# Patient Record
Sex: FEMALE | Race: WHITE | NOT HISPANIC OR LATINO | Employment: OTHER | URBAN - METROPOLITAN AREA
[De-identification: names, ages, dates, MRNs, and addresses within clinical notes are randomized per-mention and may not be internally consistent; named-entity substitution may affect disease eponyms.]

---

## 2017-01-04 ENCOUNTER — MYC MEDICAL ADVICE (OUTPATIENT)
Dept: FAMILY MEDICINE | Facility: OTHER | Age: 42
End: 2017-01-04

## 2017-01-04 ASSESSMENT — ENCOUNTER SYMPTOMS: COUGH: 1

## 2017-01-04 NOTE — PROGRESS NOTES
"  SUBJECTIVE:                                                    Nithya Tolentino is a 41 year old female who presents to clinic today for the following health issues:      Cough      Acute Illness   Acute illness concerns: URI  Onset: 2 months     Fever: no    Chills/Sweats: no    Headache (location?): YES    Sinus Pressure:YES    Conjunctivitis:  no    Ear Pain: YES: both    Rhinorrhea: no    Congestion: YES    Sore Throat: no     Cough: YES - a little bit    Wheeze: YES    Decreased Appetite: no    Nausea: no    Vomiting: no    Diarrhea:  YES    Dysuria/Freq.: no    Fatigue/Achiness: YES    Sick/Strep Exposure: no     Therapies Tried and outcome: tytlenol, albuterol, cough medicine    - 12/19/16 - Amoxicillin, Albuterol, and nebs - felt a little better but as soon as came off antibiotic symptoms came back    - 12/7/16 - Z-pack and steroid - did nothing     Problem list and histories reviewed & adjusted, as indicated.  Additional history: as documented    Problem list, Medication list, Allergies, and Medical/Social/Surgical histories reviewed in EPIC and updated as appropriate.    ROS:  Constitutional, HEENT, cardiovascular, pulmonary, gi and gu systems are negative, except as otherwise noted.    OBJECTIVE:                                                    /80 mmHg  Pulse 72  Temp(Src) 97.4  F (36.3  C) (Oral)  Resp 16  Ht 5' 7.01\" (1.702 m)  Wt 173 lb (78.472 kg)  BMI 27.09 kg/m2  SpO2 99%  Body mass index is 27.09 kg/(m^2).  GENERAL APPEARANCE: ill appearing, alert and no distress  EYES: Eyes grossly normal to inspection, PERRLA, conjunctivae and sclerae without injection or discharge, EOM intact   HENT: Bilateral ear canals without erythema or cerumen, bilateral TM's pearly grey with normal light reflex, bilateral clear serous effusion with no injection or bulging, nasal turbinates with severe swelling & erythema, or discharge, mouth without ulcers or lesions, oropharynx mildly erythematous without " edema or exudate, post nasal drip present, oral mucous membranes moist, moderate bilateral frontal and maxillary sinus tenderness   NECK: Bilateral anterior cervical adenopathy, no adenopathy in posterior cervical or supraclavicular regions  RESP: Lungs clear to auscultation - no rales, rhonchi or wheezes   CV: Regular rates and rhythm, normal S1 S2, no S3 or S4, no murmur, click or rub  MS: No musculoskeletal defects are noted and gait is age appropriate without ataxia   SKIN: No suspicious lesions or rashes, hydration status appears adeuqate with normal skin turgor   PSYCH: Alert and oriented x3; speech- coherent , normal rate and volume; able to articulate logical thoughts, able to abstract reason, no tangential thoughts, no hallucinations or delusions, mentation appears normal, Mood is euthymic. Affect is appropriate for this mood state and bright. Thought content is free of suicidal ideation, hallucinations, and delusions. Dress is adequate and upkept. Eye contact is good during conversation.       Diagnostic Test Results:  none      ASSESSMENT/PLAN:                                                        ICD-10-CM    1. Acute sinusitis with symptoms > 10 days J01.90 levofloxacin (LEVAQUIN) 750 MG tablet   2. Abnormal finding on thyroid function test R94.6 TSH     T4, free     T3, Free     - Will increase therapy to Levaquin, discussed use and side effects  - Patient declined steroid to help with nasal inflammation   - Hand out given   - Patient also due for her thyroid recheck, this was ordered by her PCP and released       The patient indicates understanding of these issues and agrees with the plan.    Follow up: MAURISIO Flaherty-MANOJ Cespedes  [facility]

## 2017-01-05 ENCOUNTER — OFFICE VISIT (OUTPATIENT)
Dept: FAMILY MEDICINE | Facility: OTHER | Age: 42
End: 2017-01-05
Payer: COMMERCIAL

## 2017-01-05 VITALS
TEMPERATURE: 97.4 F | HEIGHT: 67 IN | HEART RATE: 72 BPM | WEIGHT: 173 LBS | SYSTOLIC BLOOD PRESSURE: 112 MMHG | OXYGEN SATURATION: 99 % | BODY MASS INDEX: 27.15 KG/M2 | DIASTOLIC BLOOD PRESSURE: 80 MMHG | RESPIRATION RATE: 16 BRPM

## 2017-01-05 DIAGNOSIS — J01.90 ACUTE SINUSITIS WITH SYMPTOMS > 10 DAYS: Primary | ICD-10-CM

## 2017-01-05 DIAGNOSIS — R94.6 ABNORMAL FINDING ON THYROID FUNCTION TEST: ICD-10-CM

## 2017-01-05 LAB
T3FREE SERPL-MCNC: 3 PG/ML (ref 2.3–4.2)
T4 FREE SERPL-MCNC: 1.18 NG/DL (ref 0.76–1.46)
TSH SERPL DL<=0.05 MIU/L-ACNC: 0.37 MU/L (ref 0.4–4)

## 2017-01-05 PROCEDURE — 99000 SPECIMEN HANDLING OFFICE-LAB: CPT | Performed by: PHYSICIAN ASSISTANT

## 2017-01-05 PROCEDURE — 84481 FREE ASSAY (FT-3): CPT | Mod: 90 | Performed by: PHYSICIAN ASSISTANT

## 2017-01-05 PROCEDURE — 84443 ASSAY THYROID STIM HORMONE: CPT | Mod: 90 | Performed by: PHYSICIAN ASSISTANT

## 2017-01-05 PROCEDURE — 99213 OFFICE O/P EST LOW 20 MIN: CPT | Performed by: PHYSICIAN ASSISTANT

## 2017-01-05 PROCEDURE — 84439 ASSAY OF FREE THYROXINE: CPT | Mod: 90 | Performed by: PHYSICIAN ASSISTANT

## 2017-01-05 PROCEDURE — 36415 COLL VENOUS BLD VENIPUNCTURE: CPT | Performed by: PHYSICIAN ASSISTANT

## 2017-01-05 RX ORDER — LEVOFLOXACIN 750 MG/1
750 TABLET, FILM COATED ORAL DAILY
Qty: 10 TABLET | Refills: 0 | Status: SHIPPED | OUTPATIENT
Start: 2017-01-05 | End: 2017-03-06

## 2017-01-05 NOTE — MR AVS SNAPSHOT
After Visit Summary   1/5/2017    Nithya Tolentino    MRN: [id number]           Patient Information     Date Of Birth          1975        Visit Information        Provider Department      1/5/2017 10:15 AM Neli Sellers PA-C [facility]        Today's Diagnoses     Acute sinusitis with symptoms > 10 days    -  1       Care Instructions                  Sinusitis           What is sinusitis?   Sinusitis is swollen, infected linings of the sinuses. The sinuses are hollow spaces in the bones of your face and skull. They connect with the nose through small openings. Like the nose, their linings make mucus.   How does it occur?   Sinusitis occurs when the sinus linings become infected. The passageways from the sinuses to the nose are very narrow. Swelling and mucus may block the passageways. This leads to pressure changes in the sinuses that can be painful.   A number of things can cause swelling and sinusitis. Most often it's allergens (things that cause allergies, like pollen and mold) and viruses, such as viruses that cause the common cold. Whether the cause is allergies or a virus, the sinus linings can swell. When swelling causes the sinus passageway to swell shut, bacteria, viruses, and even fungus can be trapped in the sinuses and cause a sinus infection.   If your nasal bones have been injured or are deformed, causing partial blockage of the sinus openings, you are more likely to get sinusitis.   What are the symptoms?   Symptoms include:   feeling of fullness or pressure in your head   a headache that is most painful when you first wake up in the morning or when you bend your head down or forward   pain above or below your eyes   aching in the upper jaw and teeth   runny or stuffy nose   cough, especially at night   fluid draining down the back of your throat (postnasal drainage)   sore throat in the morning or evening.   How is it diagnosed?   Your healthcare  provider will ask about your symptoms and will examine you. You may have an X-ray to look for swelling, fluid, or small benign growths (polyps) in the sinuses.   How is it treated?   Decongestants may help. They may be nonprescription or prescription. They are available as liquids, pills, and nose sprays.   Your healthcare provider may prescribe an antibiotic. In some cases you may need to take decongestants and antibiotics for several weeks.   You may need nonprescription medicine for pain, such as acetaminophen or ibuprofen. Check with your healthcare provider before you give any medicine that contains aspirin or salicylates to a child or teen. This includes medicines like baby aspirin, some cold medicines, and Pepto Bismol. Children and teens who take aspirin are at risk for a serious illness called Reye's syndrome. Ibuprofen is an NSAID. Nonsteroidal anti-inflammatory medicines (NSAIDs) may cause stomach bleeding and other problems. These risks increase with age. Read the label and take as directed. Unless recommended by your healthcare provider, do not take NSAIDs for more than 10 days for any reason.   If you have chronic or repeated sinus infections, allergies may be the cause. Your healthcare provider may prescribe antihistamine tablets or prescription nasal sprays (steroids or cromolyn) to treat the allergies.   If you have chronic, severe sinusitis that does not respond to treatment with medicines, surgery may be done. The surgeon can create an extra or enlarged passageway in the wall of the sinus cavity. This allows the sinuses to drain more easily through the nasal passages. This should help them stay free of infection.   How long will the effects last?   Symptoms may get better gradually over 3 to 10 days. Depending on what caused the sinusitis and how severe it is, it may last for days or weeks. The symptoms may come back if you do not finish all of your antibiotic.   How can I take care of myself?    Follow your healthcare provider's instructions.   If you are taking an antibiotic, take all of it as directed by your provider. If you stop taking the medicine when your symptoms are gone but before you have taken all of the medicine, symptoms may come back.   Avoid tobacco smoke.   If you have allergies, take care to avoid the things you are allergic to, such as animal dander.   Add moisture to the air with a humidifier or a vaporizer, unless you have mold allergy (mold may grow in your vaporizer).   Inhale steam from a basin of hot water or shower to help open your sinuses and relieve pain.   Use saline nasal sprays to help wash out nasal passages and clear some mucus from the airways.   Use decongestants as directed on the label or by your provider.   If you are using a nonprescription nasal-spray decongestant, generally you should not use it for more than 3 days. After 3 days it may cause your symptoms to get worse. Ask your healthcare provider if it is OK for you to use a nasal spray decongestant longer than this.   Get plenty of rest.   Drink more fluids to keep the mucus as thin as possible so your sinuses can drain more easily.   Put warm compresses on painful areas.   Take antibiotics as prescribed. Use all of the medicine, even after you feel better. Some sinus infections require 2 to 4 weeks of antibiotic treatment.   See your healthcare provider if the pain lasts for several days or gets worse.   If the sinus areas above or below your eyes are swollen or bulging, see your healthcare provider right away. This symptom may mean that the infection is spreading. A spreading infection can affect other parts of your body--even the brain--and needs to be treated promptly.   How can I help prevent sinusitis?   Treat your colds and allergies promptly. Use decongestants as soon as you start having symptoms.   Do not smoke and stay away from secondhand smoke.   Drink lots of fluids to keep the mucus thin.    Humidify your home if the air is particularly dry.   If you have sinus infections often, consider having allergy tests.   If sinusitis continues to be a problem despite treatment, you might need an exam by an ear, nose, and throat doctor (called an ENT or otolaryngologist). The specialist will check for polyps or a deformed bone that may be blocking your sinuses.     Published by [employer].  This content is reviewed periodically and is subject to change as new health information becomes available. The information is intended to inform and educate and is not a replacement for medical evaluation, advice, diagnosis or treatment by a healthcare professional.   Developed by [employer].   ? 2010 [employer] and/or its affiliates. All Rights Reserved.   Copyright   Clinical Reference Systems 2011  Adult Health Advisor              Follow-ups after your visit        Who to contact     If you have questions or need follow up information about today's clinic visit or your schedule please [facility] at [phone].  Normal or non-critical lab and imaging results will be communicated to you by [employer], letter or phone within 4 business days after the clinic has received the results. If you do not hear from us within 7 days, please contact the clinic through [employer] or phone. If you have a critical or abnormal lab result, we will notify you by phone as soon as possible.  Submit refill requests through [employer] or call your pharmacy and they will forward the refill request to us. Please allow 3 business days for your refill to be completed.          Additional Information About Your Visit        [employer] Information     [employer] gives you secure access to your electronic health record. If you see a primary care provider, you can also send messages to your care team and make appointments. If you have questions, please call your primary care clinic.  If you do not have a primary care provider,  "please call [phone] and they will assist you.        Care EveryWhere ID     This is your Care EveryWhere ID. This could be used by other organizations to access your [place] medical records  [id number]        Your Vitals Were     Pulse Temperature Respirations Height BMI (Body Mass Index) Pulse Oximetry    72 97.4  F (36.3  C) (Oral) 16 5' 7.01\" (1.702 m) 27.09 kg/m2 99%       Blood Pressure from Last 3 Encounters:   01/05/17 112/80   12/19/16 116/66   12/07/16 110/60    Weight from Last 3 Encounters:   01/05/17 173 lb (78.472 kg)   12/19/16 172 lb (78.019 kg)   12/07/16 175 lb (79.379 kg)              Today, you had the following     No orders found for display         Today's Medication Changes          These changes are accurate as of: 1/5/17 10:48 AM.  If you have any questions, ask your nurse or doctor.               Start taking these medicines.        Dose/Directions    levofloxacin 750 MG tablet   Commonly known as:  LEVAQUIN   Used for:  Acute sinusitis with symptoms > 10 days   Started by:  Neli Sellers PA-C        Dose:  750 mg   Take 1 tablet (750 mg) by mouth daily   Quantity:  10 tablet   Refills:  0            Where to get your medicines      These medications were sent to [place] [address] [place] [facility] [address] [address] [place] [address]  [address] [address] [facility] [address]     Phone:  [phone]    - levofloxacin 750 MG tablet             Primary Care Provider    None Specified       No primary provider on file.        Thank you!     Thank you for choosing [facility]  for your care. Our goal is always to provide you with excellent care. Hearing back from our patients is one way we can continue to improve our services. Please take a few minutes to complete the written survey that you may receive in the mail after your visit with us. Thank you!             Your Updated Medication List - Protect others around you: Learn how to safely use, store and throw away your medicines " at www.disposemymeds.org.          This list is accurate as of: 1/5/17 10:48 AM.  Always use your most recent med list.                   Brand Name Dispense Instructions for use    albuterol 108 (90 BASE) MCG/ACT Inhaler    PROAIR HFA/PROVENTIL HFA/VENTOLIN HFA    1 Inhaler    Inhale 2 puffs into the lungs every 6 hours as needed for shortness of breath / dyspnea or wheezing       CLARITIN PO      Take  by mouth.       cyanocobalamin 1000 MCG tablet    vitamin  B-12     Take 1,000 mcg by mouth daily       fluticasone 50 MCG/ACT spray    FLONASE    16 g    Spray 2 sprays into both nostrils daily       guaiFENesin-codeine 100-10 MG/5ML Soln solution    ROBITUSSIN AC    120 mL    Take 5 mLs by mouth every 4 hours as needed for cough       IRON SUPPLEMENT PO      Take 65 mg by mouth daily       levofloxacin 750 MG tablet    LEVAQUIN    10 tablet    Take 1 tablet (750 mg) by mouth daily       naproxen sodium 275 MG tablet    ANAPROX    90 tablet    Take 1 tablet (275 mg) by mouth 2 times daily (with meals)       TYLENOL 500 MG tablet   Generic drug:  acetaminophen      Take 1-2 tablets by mouth every 6 hours as needed. 2 tablets before bedtime

## 2017-01-05 NOTE — NURSING NOTE
"Chief Complaint   Patient presents with     Cough     Panel Management     Flu       Initial /80 mmHg  Pulse 72  Temp(Src) 97.4  F (36.3  C) (Oral)  Resp 16  Ht 5' 7.01\" (1.702 m)  Wt 173 lb (78.472 kg)  BMI 27.09 kg/m2  SpO2 99% Estimated body mass index is 27.09 kg/(m^2) as calculated from the following:    Height as of this encounter: 5' 7.01\" (1.702 m).    Weight as of this encounter: 173 lb (78.472 kg).  BP completed using cuff size: regular  "

## 2017-01-05 NOTE — PATIENT INSTRUCTIONS
Sinusitis           What is sinusitis?   Sinusitis is swollen, infected linings of the sinuses. The sinuses are hollow spaces in the bones of your face and skull. They connect with the nose through small openings. Like the nose, their linings make mucus.   How does it occur?   Sinusitis occurs when the sinus linings become infected. The passageways from the sinuses to the nose are very narrow. Swelling and mucus may block the passageways. This leads to pressure changes in the sinuses that can be painful.   A number of things can cause swelling and sinusitis. Most often it's allergens (things that cause allergies, like pollen and mold) and viruses, such as viruses that cause the common cold. Whether the cause is allergies or a virus, the sinus linings can swell. When swelling causes the sinus passageway to swell shut, bacteria, viruses, and even fungus can be trapped in the sinuses and cause a sinus infection.   If your nasal bones have been injured or are deformed, causing partial blockage of the sinus openings, you are more likely to get sinusitis.   What are the symptoms?   Symptoms include:   feeling of fullness or pressure in your head   a headache that is most painful when you first wake up in the morning or when you bend your head down or forward   pain above or below your eyes   aching in the upper jaw and teeth   runny or stuffy nose   cough, especially at night   fluid draining down the back of your throat (postnasal drainage)   sore throat in the morning or evening.   How is it diagnosed?   Your healthcare provider will ask about your symptoms and will examine you. You may have an X-ray to look for swelling, fluid, or small benign growths (polyps) in the sinuses.   How is it treated?   Decongestants may help. They may be nonprescription or prescription. They are available as liquids, pills, and nose sprays.   Your healthcare provider may prescribe an antibiotic. In some cases you may need to  take decongestants and antibiotics for several weeks.   You may need nonprescription medicine for pain, such as acetaminophen or ibuprofen. Check with your healthcare provider before you give any medicine that contains aspirin or salicylates to a child or teen. This includes medicines like baby aspirin, some cold medicines, and Pepto Bismol. Children and teens who take aspirin are at risk for a serious illness called Reye's syndrome. Ibuprofen is an NSAID. Nonsteroidal anti-inflammatory medicines (NSAIDs) may cause stomach bleeding and other problems. These risks increase with age. Read the label and take as directed. Unless recommended by your healthcare provider, do not take NSAIDs for more than 10 days for any reason.   If you have chronic or repeated sinus infections, allergies may be the cause. Your healthcare provider may prescribe antihistamine tablets or prescription nasal sprays (steroids or cromolyn) to treat the allergies.   If you have chronic, severe sinusitis that does not respond to treatment with medicines, surgery may be done. The surgeon can create an extra or enlarged passageway in the wall of the sinus cavity. This allows the sinuses to drain more easily through the nasal passages. This should help them stay free of infection.   How long will the effects last?   Symptoms may get better gradually over 3 to 10 days. Depending on what caused the sinusitis and how severe it is, it may last for days or weeks. The symptoms may come back if you do not finish all of your antibiotic.   How can I take care of myself?   Follow your healthcare provider's instructions.   If you are taking an antibiotic, take all of it as directed by your provider. If you stop taking the medicine when your symptoms are gone but before you have taken all of the medicine, symptoms may come back.   Avoid tobacco smoke.   If you have allergies, take care to avoid the things you are allergic to, such as animal dander.   Add  moisture to the air with a humidifier or a vaporizer, unless you have mold allergy (mold may grow in your vaporizer).   Inhale steam from a basin of hot water or shower to help open your sinuses and relieve pain.   Use saline nasal sprays to help wash out nasal passages and clear some mucus from the airways.   Use decongestants as directed on the label or by your provider.   If you are using a nonprescription nasal-spray decongestant, generally you should not use it for more than 3 days. After 3 days it may cause your symptoms to get worse. Ask your healthcare provider if it is OK for you to use a nasal spray decongestant longer than this.   Get plenty of rest.   Drink more fluids to keep the mucus as thin as possible so your sinuses can drain more easily.   Put warm compresses on painful areas.   Take antibiotics as prescribed. Use all of the medicine, even after you feel better. Some sinus infections require 2 to 4 weeks of antibiotic treatment.   See your healthcare provider if the pain lasts for several days or gets worse.   If the sinus areas above or below your eyes are swollen or bulging, see your healthcare provider right away. This symptom may mean that the infection is spreading. A spreading infection can affect other parts of your body--even the brain--and needs to be treated promptly.   How can I help prevent sinusitis?   Treat your colds and allergies promptly. Use decongestants as soon as you start having symptoms.   Do not smoke and stay away from secondhand smoke.   Drink lots of fluids to keep the mucus thin.   Humidify your home if the air is particularly dry.   If you have sinus infections often, consider having allergy tests.   If sinusitis continues to be a problem despite treatment, you might need an exam by an ear, nose, and throat doctor (called an ENT or otolaryngologist). The specialist will check for polyps or a deformed bone that may be blocking your sinuses.     Published by  [employer].  This content is reviewed periodically and is subject to change as new health information becomes available. The information is intended to inform and educate and is not a replacement for medical evaluation, advice, diagnosis or treatment by a healthcare professional.   Developed by [employer].   ? 2010 [facility] and/or its affiliates. All Rights Reserved.   Copyright   Clinical Reference Systems 2011  Adult Health Advisor

## 2017-03-01 ENCOUNTER — TELEPHONE (OUTPATIENT)
Dept: FAMILY MEDICINE | Facility: OTHER | Age: 42
End: 2017-03-01

## 2017-03-01 NOTE — TELEPHONE ENCOUNTER
Patient was seen 1/5/2017 for acute sinus.  Will have her see provider as scheduled.    Justin Rivero RN

## 2017-03-01 NOTE — TELEPHONE ENCOUNTER
Pt is scheduled on 3/3/17 for sinus symptoms. Please call pt to see if they are able to do RN protocol.    Xuan Soto CMA ([facility])

## 2017-03-06 ENCOUNTER — OFFICE VISIT (OUTPATIENT)
Dept: FAMILY MEDICINE | Facility: OTHER | Age: 42
End: 2017-03-06
Payer: COMMERCIAL

## 2017-03-06 VITALS
BODY MASS INDEX: 27.58 KG/M2 | HEIGHT: 67 IN | WEIGHT: 175.7 LBS | DIASTOLIC BLOOD PRESSURE: 64 MMHG | HEART RATE: 76 BPM | RESPIRATION RATE: 12 BRPM | TEMPERATURE: 98.1 F | SYSTOLIC BLOOD PRESSURE: 110 MMHG

## 2017-03-06 DIAGNOSIS — J32.9 RHINOSINUSITIS: ICD-10-CM

## 2017-03-06 DIAGNOSIS — J32.8 OTHER CHRONIC SINUSITIS: Primary | ICD-10-CM

## 2017-03-06 DIAGNOSIS — R00.2 PALPITATIONS: ICD-10-CM

## 2017-03-06 LAB
T3 SERPL-MCNC: 102 NG/DL (ref 60–181)
T4 FREE SERPL-MCNC: 1.11 NG/DL (ref 0.76–1.46)
TSH SERPL DL<=0.05 MIU/L-ACNC: 0.31 MU/L (ref 0.4–4)

## 2017-03-06 PROCEDURE — 99214 OFFICE O/P EST MOD 30 MIN: CPT | Performed by: FAMILY MEDICINE

## 2017-03-06 PROCEDURE — 36415 COLL VENOUS BLD VENIPUNCTURE: CPT | Performed by: FAMILY MEDICINE

## 2017-03-06 PROCEDURE — 84443 ASSAY THYROID STIM HORMONE: CPT | Performed by: FAMILY MEDICINE

## 2017-03-06 PROCEDURE — 84439 ASSAY OF FREE THYROXINE: CPT | Performed by: FAMILY MEDICINE

## 2017-03-06 PROCEDURE — 84480 ASSAY TRIIODOTHYRONINE (T3): CPT | Performed by: FAMILY MEDICINE

## 2017-03-06 RX ORDER — MOMETASONE FUROATE MONOHYDRATE 50 UG/1
2 SPRAY, METERED NASAL DAILY
Qty: 1 BOX | Refills: 11 | Status: SHIPPED | OUTPATIENT
Start: 2017-03-06 | End: 2017-05-15

## 2017-03-06 RX ORDER — FEXOFENADINE HCL 180 MG/1
180 TABLET ORAL DAILY
Qty: 30 TABLET | Refills: 1 | Status: SHIPPED | OUTPATIENT
Start: 2017-03-06 | End: 2022-01-28

## 2017-03-06 RX ORDER — DOXYCYCLINE 100 MG/1
TABLET ORAL
Refills: 0 | COMMUNITY
Start: 2017-02-25 | End: 2017-09-28

## 2017-03-06 RX ORDER — AZELASTINE 1 MG/ML
1-2 SPRAY, METERED NASAL 2 TIMES DAILY
Qty: 1 BOTTLE | Refills: 11 | Status: SHIPPED | OUTPATIENT
Start: 2017-03-06 | End: 2017-09-28

## 2017-03-06 ASSESSMENT — PAIN SCALES - GENERAL: PAINLEVEL: MILD PAIN (2)

## 2017-03-06 NOTE — PATIENT INSTRUCTIONS
Chronic Sinusitis  Chronic sinusitis is a long-term swelling or infection of the sinuses. If sinusitis lasts more than 12 weeks, it is called chronic.  What is chronic sinusitis?  Sinuses are air-filled spaces in the skull behind the face. They are kept moist and clean by a lining of mucosa. Things such as pollen, smoke, and chemical fumes can irritate the mucosa. Constant exposure to irritants can cause ongoing inflammation of this lining. It can also damage tiny hairlike cilia that cover the mucosa. Cilia help transport mucus toward the opening of the sinus. Damage to cilia keeps mucus from draining from the sinuses.  Causes of chronic sinusitis  Problems that irritate the mucosa or block drainage can lead to chronic sinusitis. These may include:    Infections    Chronic allergies    Nasal polyps, deviated septum, or other obstructions    Constant exposure to irritants, such as cigarette smoke or fumes  Common symptoms of chronic sinusitis  Symptoms may include:    Facial pain and pressure    Headache and sinus pain    Nasal congestion    Thick, colored drainage from the nose    Postnasal drainage (thick mucus draining down the back of the throat)    Loss of smell    Fever    Cough    Sore throat  Diagnosis of chronic sinusitis  The doctor will ask about your symptoms and medical history. The doctor will examine your nose and face. You may have imaging studies like an X-ray or CT scan of the sinuses. You may also have a culture of the drainage to check for bacteria. And, a test called an endoscopy may be done. During this test, a lighted tube is put through your nose up into your sinuses to view the sinuses.  Treating chronic sinusitis  Treatment involves reducing irritation and inflammation. Your plan may include:    Taking medications. Medications may be prescribed reduce secretions and swelling. These help unblock the sinuses and allow them to drain. Antibiotics are prescribed for bacterial  infections.    Sinus irrigation (flushing with saltwater or saline solution) may be suggested. This helps to clear out mucus.    A plan to control allergies is helpful if they are present. This plan may include medications or allergy shots.    Surgery, in some cases. Surgery on the nose, sinuses, or both can improve sinus drainage or remove nasal obstructions.    3081-2198 [employer]. [address] [place] [address], [place] [address]. All rights reserved. This information is not intended as a substitute for professional medical care. Always follow your healthcare professional's instructions.

## 2017-03-06 NOTE — MR AVS SNAPSHOT
After Visit Summary   3/6/2017    Nithya Tolentino    MRN: [id number]           Patient Information     Date Of Birth          1975        Visit Information        Provider Department      3/6/2017 8:20 AM Janessa Fontenot MD [facility]        Today's Diagnoses     Other chronic sinusitis    -  1      Care Instructions      Chronic Sinusitis  Chronic sinusitis is a long-term swelling or infection of the sinuses. If sinusitis lasts more than 12 weeks, it is called chronic.  What is chronic sinusitis?  Sinuses are air-filled spaces in the skull behind the face. They are kept moist and clean by a lining of mucosa. Things such as pollen, smoke, and chemical fumes can irritate the mucosa. Constant exposure to irritants can cause ongoing inflammation of this lining. It can also damage tiny hairlike cilia that cover the mucosa. Cilia help transport mucus toward the opening of the sinus. Damage to cilia keeps mucus from draining from the sinuses.  Causes of chronic sinusitis  Problems that irritate the mucosa or block drainage can lead to chronic sinusitis. These may include:    Infections    Chronic allergies    Nasal polyps, deviated septum, or other obstructions    Constant exposure to irritants, such as cigarette smoke or fumes  Common symptoms of chronic sinusitis  Symptoms may include:    Facial pain and pressure    Headache and sinus pain    Nasal congestion    Thick, colored drainage from the nose    Postnasal drainage (thick mucus draining down the back of the throat)    Loss of smell    Fever    Cough    Sore throat  Diagnosis of chronic sinusitis  The doctor will ask about your symptoms and medical history. The doctor will examine your nose and face. You may have imaging studies like an X-ray or CT scan of the sinuses. You may also have a culture of the drainage to check for bacteria. And, a test called an endoscopy may be done. During this test, a lighted tube is put  through your nose up into your sinuses to view the sinuses.  Treating chronic sinusitis  Treatment involves reducing irritation and inflammation. Your plan may include:    Taking medications. Medications may be prescribed reduce secretions and swelling. These help unblock the sinuses and allow them to drain. Antibiotics are prescribed for bacterial infections.    Sinus irrigation (flushing with saltwater or saline solution) may be suggested. This helps to clear out mucus.    A plan to control allergies is helpful if they are present. This plan may include medications or allergy shots.    Surgery, in some cases. Surgery on the nose, sinuses, or both can improve sinus drainage or remove nasal obstructions.    0395-0060 [employer]. [address] [place] [address]. All rights reserved. This information is not intended as a substitute for professional medical care. Always follow your healthcare professional's instructions.              Follow-ups after your visit        Additional Services     OTOLARYNGOLOGY REFERRAL       Your provider has referred you to: [facility]: [facility] [phone]   [email or web address]    Please be aware that coverage of these services is subject to the terms and limitations of your health insurance plan.  Call member services at your health plan with any benefit or coverage questions.      Please bring the following with you to your appointment:    (1) Any X-Rays, CTs or MRIs which have been performed.  Contact the facility where they were done to arrange for  prior to your scheduled appointment.   (2) List of current medications  (3) This referral request   (4) Any documents/labs given to you for this referral                  Who to contact     If you have questions or need follow up information about today's clinic visit or your schedule please contact [facility] [place] directly at [phone].  Normal  "or non-critical lab and imaging results will be communicated to you by MyChart, letter or phone within 4 business days after the clinic has received the results. If you do not hear from us within 7 days, please contact the clinic through [employer] or phone. If you have a critical or abnormal lab result, we will notify you by phone as soon as possible.  Submit refill requests through [employer] or call your pharmacy and they will forward the refill request to us. Please allow 3 business days for your refill to be completed.          Additional Information About Your Visit        [employer] Information     [employer] gives you secure access to your electronic health record. If you see a primary care provider, you can also send messages to your care team and make appointments. If you have questions, please call your primary care clinic.  If you do not have a primary care provider, please call [phone] and they will assist you.        Care EveryWhere ID     This is your Care EveryWhere ID. This could be used by other organizations to access your [facility] medical records  [id number]        Your Vitals Were     Pulse Temperature Respirations Height Last Period Breastfeeding?    76 98.1  F (36.7  C) (Temporal) 12 5' 7\" (1.702 m) 02/15/2017 No    BMI (Body Mass Index)                   27.52 kg/m2            Blood Pressure from Last 3 Encounters:   03/06/17 110/64   01/05/17 112/80   12/19/16 116/66    Weight from Last 3 Encounters:   03/06/17 175 lb 11.2 oz (79.7 kg)   01/05/17 173 lb (78.5 kg)   12/19/16 172 lb (78 kg)              We Performed the Following     OTOLARYNGOLOGY REFERRAL          Today's Medication Changes          These changes are accurate as of: 3/6/17  8:57 AM.  If you have any questions, ask your nurse or doctor.               Start taking these medicines.        Dose/Directions    azelastine 0.1 % spray   Commonly known as:  ASTELIN   Used for:  Other chronic sinusitis   Started by:  Janessa Fontenot " MD Jackie        Dose:  1-2 spray   Spray 1-2 sprays into both nostrils 2 times daily   Quantity:  1 Bottle   Refills:  11       fexofenadine 180 MG tablet   Commonly known as:  ALLEGRA   Used for:  Other chronic sinusitis   Started by:  Janessa Fontenot MD        Dose:  180 mg   Take 1 tablet (180 mg) by mouth daily   Quantity:  30 tablet   Refills:  1       mometasone 50 MCG/ACT spray   Commonly known as:  NASONEX   Used for:  Other chronic sinusitis   Started by:  Janessa Fontenot MD        Dose:  2 spray   Spray 2 sprays into both nostrils daily   Quantity:  1 Box   Refills:  11            Where to get your medicines      These medications were sent to [place] [address] [facility] [place] - [address]  [address] [facility], [place] [address]     Phone:  [phone]     azelastine 0.1 % spray    fexofenadine 180 MG tablet    mometasone 50 MCG/ACT spray                Primary Care Provider Office Phone # Fax #    Janessa Fontenot [phone]       [facility] [address] [place] [address]        Thank you!     Thank you for choosing [facility]  for your care. Our goal is always to provide you with excellent care. Hearing back from our patients is one way we can continue to improve our services. Please take a few minutes to complete the written survey that you may receive in the mail after your visit with us. Thank you!             Your Updated Medication List - Protect others around you: Learn how to safely use, store and throw away your medicines at www.disposemymeds.org.          This list is accurate as of: 3/6/17  8:57 AM.  Always use your most recent med list.                   Brand Name Dispense Instructions for use    albuterol 108 (90 BASE) MCG/ACT Inhaler    PROAIR HFA/PROVENTIL HFA/VENTOLIN HFA    1 Inhaler    Inhale 2 puffs into the lungs every 6 hours as needed for shortness of breath / dyspnea or wheezing       azelastine 0.1 %  spray    ASTELIN    1 Bottle    Spray 1-2 sprays into both nostrils 2 times daily       cyanocobalamin 1000 MCG tablet    vitamin  B-12     Take 1,000 mcg by mouth daily       doxycycline Monohydrate 100 MG Tabs      TAKE 1 TABLET (100 MG) BY MOUTH TWICE A DAY WITH BREAKFAST AND DINNER.       fexofenadine 180 MG tablet    ALLEGRA    30 tablet    Take 1 tablet (180 mg) by mouth daily       fluticasone 50 MCG/ACT spray    FLONASE    16 g    Spray 2 sprays into both nostrils daily       IRON SUPPLEMENT PO      Take 65 mg by mouth daily Reported on 3/6/2017       mometasone 50 MCG/ACT spray    NASONEX    1 Box    Spray 2 sprays into both nostrils daily       naproxen sodium 275 MG tablet    ANAPROX    90 tablet    Take 1 tablet (275 mg) by mouth 2 times daily (with meals)       TYLENOL 500 MG tablet   Generic drug:  acetaminophen      Take 1-2 tablets by mouth every 6 hours as needed. 2 tablets before bedtime

## 2017-03-06 NOTE — PROGRESS NOTES
SUBJECTIVE:                                                    Nithya Tolentino is a 41 year old female who presents to clinic today for the following health issues:      Acute Illness   Acute illness concerns: sinus infection   Onset: 2 weeks ago- intermittently since december    Fever: no     Chills/Sweats: YES    Headache (location?): YES    Sinus Pressure:YES    Conjunctivitis:  no    Ear Pain: YES: both    Rhinorrhea: YES    Congestion: YES    Sore Throat: YES     Cough: YES-non-productive    Wheeze: no     Decreased Appetite: YES    Nausea: YES    Vomiting: no     Diarrhea:  no     Dysuria/Freq.: no     Fatigue/Achiness: YES    Sick/Strep Exposure: no      Therapies Tried and outcome: Patient has been taking Doxycycline and Methylprednisolone to help. She was seen at [facility] in [place] 10 days ago.   She has been having recurrent moderate-  severe sinus pain and pressure for upwards of 3 months. During this time, she has had Zithromax with no relief, amoxicillin with transient relief but return, and then Levaquin , Omnicef and currently on the last dose of DoxycyclineShe has chronic seasonal allergies, takes Claritin and Flonase daily. Prior to this year, she has had generally one infection per year in the sinuses or respiratory region. She denies severe headaches or dizziness, tinnitus, syncope, epistaxis. Denies any preceding trauma to the head or neck.  She continues to have facial pressure. No relief with OTC medications Advil, Sudafed. Denies fever, n/v/d, cough, rash, ST,  She reports feeling of pressure in the ears.    She has an x ray in the urgent care , she shows some evidence of sinusitis       She also reports getting occasional palpitations, she had an episode last year around this time another Holter monitor placed, results showed some PVCs but no arrhythmias. Reviewed results with cardiologist at that time did not recommend any abltaion  or medication at that the time   except if symptoms were  persistent, her  thyroid level was fine about a year ago . She reports she was doing fine until a few weeks ago , when she started getting occasional palpitations again , mainly when she lays down , she denies any chest pain or dizziness with symptoms. She reports a recent thyroid test was a little bit out of range like to have that rechecked today.    Problem list and histories reviewed & adjusted, as indicated.  Additional history: as documented    Patient Active Problem List   Diagnosis     Seasonal allergic rhinitis     Dysmenorrhea     CARDIOVASCULAR SCREENING; LDL GOAL LESS THAN 160     Restless legs syndrome (RLS)     Family history of supraventricular tachycardia     Other chronic sinusitis     Rhinosinusitis     Past Surgical History   Procedure Laterality Date     No history of surgery         Social History   Substance Use Topics     Smoking status: Never Smoker     Smokeless tobacco: Never Used     Alcohol use Yes      Comment: occ     Family History   Problem Relation Age of Onset     Thyroid Disease Mother      Arthritis Maternal Grandmother      osteoarthritis      CANCER Maternal Grandfather      bone     Neurologic Disorder Paternal Grandmother      brain tumor     GASTROINTESTINAL DISEASE Other      cousin on dads side has celiac     Thyroid Disease Maternal Aunt          Current Outpatient Prescriptions   Medication Sig Dispense Refill     doxycycline Monohydrate 100 MG TABS TAKE 1 TABLET (100 MG) BY MOUTH TWICE A DAY WITH BREAKFAST AND DINNER.  0     fexofenadine (ALLEGRA) 180 MG tablet Take 1 tablet (180 mg) by mouth daily 30 tablet 1     mometasone (NASONEX) 50 MCG/ACT spray Spray 2 sprays into both nostrils daily 1 Box 11     azelastine (ASTELIN) 0.1 % spray Spray 1-2 sprays into both nostrils 2 times daily 1 Bottle 11     fluticasone (FLONASE) 50 MCG/ACT nasal spray Spray 2 sprays into both nostrils daily 16 g 8     albuterol (PROAIR HFA, PROVENTIL HFA, VENTOLIN HFA)  "108 (90 BASE) MCG/ACT inhaler Inhale 2 puffs into the lungs every 6 hours as needed for shortness of breath / dyspnea or wheezing 1 Inhaler 0     naproxen sodium (ANAPROX) 275 MG tablet Take 1 tablet (275 mg) by mouth 2 times daily (with meals) 90 tablet 3     cyanocobalamin (VITAMIN  B-12) 1000 MCG tablet Take 1,000 mcg by mouth daily       acetaminophen (TYLENOL) 500 MG tablet Take 1-2 tablets by mouth every 6 hours as needed. 2 tablets before bedtime       Ferrous Sulfate (IRON SUPPLEMENT PO) Take 65 mg by mouth daily Reported on 3/6/2017       Allergies   Allergen Reactions     Augmentin Nausea and Vomiting     BP Readings from Last 3 Encounters:   03/06/17 110/64   01/05/17 112/80   12/19/16 116/66    Wt Readings from Last 3 Encounters:   03/06/17 175 lb 11.2 oz (79.7 kg)   01/05/17 173 lb (78.5 kg)   12/19/16 172 lb (78 kg)                  Labs reviewed in EPIC    Reviewed and updated as needed this visit by clinical staff  Tobacco  Allergies  Med Hx  Surg Hx  Fam Hx  Soc Hx      Reviewed and updated as needed this visit by Provider         ROS:  C: NEGATIVE for fever, chills, change in weight  ENT/MOUTH: POSITIVE for nasal congestion, postnasal drainage and sinus pressure  R: NEGATIVE for significant cough or SOB  CV: NEGATIVE for chest pain, palpitations or peripheral edema    OBJECTIVE:                                                    /64 (BP Location: Left arm, Cuff Size: Adult Small)  Pulse 76  Temp 98.1  F (36.7  C) (Temporal)  Resp 12  Ht 5' 7\" (1.702 m)  Wt 175 lb 11.2 oz (79.7 kg)  LMP 02/15/2017  Breastfeeding? No  BMI 27.52 kg/m2  Body mass index is 27.52 kg/(m^2).   GENERAL: , alert, well nourished, well hydrated, no distress  HENT: ear canals- normal; TMs- normal; Nose- boggy turbinates and congested nasal mucosa; tenderness over the frontal and maxillary sinuses on both sides, no obvious postnasal drainage noted today Mouth- no ulcers, no lesions  NECK: no tenderness, no adenopathy, " no asymmetry, no masses, no stiffness; thyroid- normal to palpation  RESP: lungs clear to auscultation - no rales, no rhonchi, no wheezes  CV: regular rates and rhythm, normal S1 S2, no S3 or S4 and no murmur, no click or rub -    Diagnostic test results:  Diagnostic Test Results:  Results for orders placed or performed in visit on 03/06/17 (from the past 24 hour(s))   TSH   Result Value Ref Range    TSH 0.31 (L) 0.40 - 4.00 mU/L   T4, free   Result Value Ref Range    T4 Free 1.11 0.76 - 1.46 ng/dL        ASSESSMENT/PLAN:                                                    1. Other chronic sinusitis  Ongoing for over 3 months, has been on 5 different antibiotics, currently completing doxycycline. Discussed seeing ENT for further evaluation. I also discussed getting a CT of the sinuses but would like to wait till she sees the ENT specialist. I will switch her from Claritin to Allegra, I will also switch her Flonase to Nasonex. We will also add as a lasting this would help her symptoms.  - fexofenadine (ALLEGRA) 180 MG tablet; Take 1 tablet (180 mg) by mouth daily  Dispense: 30 tablet; Refill: 1  - mometasone (NASONEX) 50 MCG/ACT spray; Spray 2 sprays into both nostrils daily  Dispense: 1 Box; Refill: 11  - azelastine (ASTELIN) 0.1 % spray; Spray 1-2 sprays into both nostrils 2 times daily  Dispense: 1 Bottle; Refill: 11  - OTOLARYNGOLOGY REFERRAL    2. Palpitations  Reviewed her previous Holter monitor results with her. And cardiology recommendations. Discuss if symptoms persistent to consider starting diltiazem 120 mg by mouth daily or metoprolol. She will like to hold off on starting any medication at this point she reports the palpitations really significant. She will let me know if it worsens. We'll recheck her thyroid levels today.  - TSH  - T3, total  - T4, free    3. Rhinosinusitis  As above   - fexofenadine (ALLEGRA) 180 MG tablet; Take 1 tablet (180 mg) by mouth daily  Dispense: 30 tablet; Refill: 1  -  mometasone (NASONEX) 50 MCG/ACT spray; Spray 2 sprays into both nostrils daily  Dispense: 1 Box; Refill: 11  - azelastine (ASTELIN) 0.1 % spray; Spray 1-2 sprays into both nostrils 2 times daily  Dispense: 1 Bottle; Refill: 11      Follow up with Provider - ebony Fontenot MD, MD  [facility]    Chart documentation with Dragon Voice recognition Software. Although reviewed after completion, some words and grammatical errors may remain.      Patient Instructions     Chronic Sinusitis  Chronic sinusitis is a long-term swelling or infection of the sinuses. If sinusitis lasts more than 12 weeks, it is called chronic.  What is chronic sinusitis?  Sinuses are air-filled spaces in the skull behind the face. They are kept moist and clean by a lining of mucosa. Things such as pollen, smoke, and chemical fumes can irritate the mucosa. Constant exposure to irritants can cause ongoing inflammation of this lining. It can also damage tiny hairlike cilia that cover the mucosa. Cilia help transport mucus toward the opening of the sinus. Damage to cilia keeps mucus from draining from the sinuses.  Causes of chronic sinusitis  Problems that irritate the mucosa or block drainage can lead to chronic sinusitis. These may include:    Infections    Chronic allergies    Nasal polyps, deviated septum, or other obstructions    Constant exposure to irritants, such as cigarette smoke or fumes  Common symptoms of chronic sinusitis  Symptoms may include:    Facial pain and pressure    Headache and sinus pain    Nasal congestion    Thick, colored drainage from the nose    Postnasal drainage (thick mucus draining down the back of the throat)    Loss of smell    Fever    Cough    Sore throat  Diagnosis of chronic sinusitis  The doctor will ask about your symptoms and medical history. The doctor will examine your nose and face. You may have imaging studies like an X-ray or CT scan of the sinuses. You may also have a  culture of the drainage to check for bacteria. And, a test called an endoscopy may be done. During this test, a lighted tube is put through your nose up into your sinuses to view the sinuses.  Treating chronic sinusitis  Treatment involves reducing irritation and inflammation. Your plan may include:    Taking medications. Medications may be prescribed reduce secretions and swelling. These help unblock the sinuses and allow them to drain. Antibiotics are prescribed for bacterial infections.    Sinus irrigation (flushing with saltwater or saline solution) may be suggested. This helps to clear out mucus.    A plan to control allergies is helpful if they are present. This plan may include medications or allergy shots.    Surgery, in some cases. Surgery on the nose, sinuses, or both can improve sinus drainage or remove nasal obstructions.    2273-8322 [employer]. [address] [place] [address], [place] [address]. All rights reserved. This information is not intended as a substitute for professional medical care. Always follow your healthcare professional's instructions.

## 2017-03-07 NOTE — PROGRESS NOTES
Emmanuel Barriga ,    Your TSH is still slightly low but the free t4 and t3 levels are normal, this is subclinical Hyperthyroidism and most times if no symptoms , will recommend to just continue to monitor . If you palpitations worsens however , please let me know , will consider having you see the endocrinology

## 2017-03-09 ENCOUNTER — MYC MEDICAL ADVICE (OUTPATIENT)
Dept: FAMILY MEDICINE | Facility: OTHER | Age: 42
End: 2017-03-09

## 2017-03-09 DIAGNOSIS — J32.8 OTHER CHRONIC SINUSITIS: Primary | ICD-10-CM

## 2017-03-10 ENCOUNTER — MYC MEDICAL ADVICE (OUTPATIENT)
Dept: FAMILY MEDICINE | Facility: OTHER | Age: 42
End: 2017-03-10

## 2017-03-10 DIAGNOSIS — J32.8 OTHER CHRONIC SINUSITIS: Primary | ICD-10-CM

## 2017-03-16 ENCOUNTER — HOSPITAL ENCOUNTER (OUTPATIENT)
Dept: CT IMAGING | Facility: CLINIC | Age: 42
Discharge: HOME | End: 2017-03-16
Attending: FAMILY MEDICINE | Admitting: FAMILY MEDICINE
Payer: COMMERCIAL

## 2017-03-16 DIAGNOSIS — J32.8 OTHER CHRONIC SINUSITIS: ICD-10-CM

## 2017-03-16 PROCEDURE — 70486 CT MAXILLOFACIAL W/O DYE: CPT

## 2017-04-14 NOTE — PROGRESS NOTES
"Chief Complaint -   Chief Complaint   Patient presents with     Consult     Referring      Sinus Problem      chronic sinusitis       History of Present Illness - Nithya Tolentino is a 41 year old female who presents with concerns about sinus symptoms. The main symptom recently has been head pressure and sinus pressure with minimal drainage, and this has been present since the last week. Other associated symptoms have included headaches, pressure, drainage, \"goup in throat\", dizziness and pain mainly on left side. These symptoms have been frequent and are very disruptive to the patient's lifestyle. She has had 5 sinus infections since 11/16.  She has had pain, pressure, post nasal drainage, runny nose, cough, and tired during the sinus infections.   Recent treatments have included new medication, chiropractor and eating local honey daily.  A 2 week trial of nasal steroids has not been completed. A burst of oral steroids has been completed. The patient has no history of sinus surgery. The patient reports has history for migraine headaches.  She has been taken off Flonase and started on Allegra, Nasonex, and Astelin.  She has been on allergy medication since she was 18 years old.  She has very severe Spring/summer allergies but is now on an allergy regiment year round.    Past Medical History -   Past Medical History:   Diagnosis Date     Dysmenorrhea      Environmental allergies        Current Medications -   Current Outpatient Prescriptions:      doxycycline Monohydrate 100 MG TABS, TAKE 1 TABLET (100 MG) BY MOUTH TWICE A DAY WITH BREAKFAST AND DINNER., Disp: , Rfl: 0     fexofenadine (ALLEGRA) 180 MG tablet, Take 1 tablet (180 mg) by mouth daily, Disp: 30 tablet, Rfl: 1     mometasone (NASONEX) 50 MCG/ACT spray, Spray 2 sprays into both nostrils daily, Disp: 1 Box, Rfl: 11     azelastine (ASTELIN) 0.1 % spray, Spray 1-2 sprays into both nostrils 2 times daily, Disp: 1 Bottle, Rfl: 11     albuterol (PROAIR " HFA, PROVENTIL HFA, VENTOLIN HFA) 108 (90 BASE) MCG/ACT inhaler, Inhale 2 puffs into the lungs every 6 hours as needed for shortness of breath / dyspnea or wheezing, Disp: 1 Inhaler, Rfl: 0     naproxen sodium (ANAPROX) 275 MG tablet, Take 1 tablet (275 mg) by mouth 2 times daily (with meals), Disp: 90 tablet, Rfl: 3     cyanocobalamin (VITAMIN  B-12) 1000 MCG tablet, Take 1,000 mcg by mouth daily, Disp: , Rfl:      Ferrous Sulfate (IRON SUPPLEMENT PO), Take 65 mg by mouth daily Reported on 3/6/2017, Disp: , Rfl:      acetaminophen (TYLENOL) 500 MG tablet, Take 1-2 tablets by mouth every 6 hours as needed. 2 tablets before bedtime, Disp: , Rfl:     Allergies -   Allergies   Allergen Reactions     Augmentin Nausea and Vomiting       Social History -   Social History     Social History     Marital status:      Spouse name: N/A     Number of children: N/A     Years of education: N/A     Social History Main Topics     Smoking status: Never Smoker     Smokeless tobacco: Never Used     Alcohol use Yes      Comment: occ     Drug use: No     Sexual activity: Yes     Partners: Male     Birth control/ protection: Male Surgical      Comment:  had testicular cancer     Other Topics Concern     Not on file     Social History Narrative    Dairy/d 1 servings/d.     Caffeine 1 servings/d    Exercise 1-2 x week    Sunscreen used - Yes    Seatbelts used - Yes    Working smoke/CO detectors in the home - No    Guns stored in the home - Yes    Self Breast Exams - Sometimes    Self Testicular Exam - NOT APPLICABLE    Eye Exam up to date - No    Dental Exam up to date - Yes    Pap Smear up to date - Yes    Mammogram up to date - NOT APPLICABLE    PSA up to date - NOT APPLICABLE    Dexa Scan up to date - NOT APPLICABLE    Flex Sig / Colonoscopy up to date - NOT APPLICABLE    Immunizations up to date - Yes    Abuse: Current or Past(Physical, Sexual or Emotional)- No    Do you feel safe in your environment - Yes       Family  "History -   Family History   Problem Relation Age of Onset     Thyroid Disease Mother      Arthritis Maternal Grandmother      osteoarthritis      CANCER Maternal Grandfather      bone     Neurologic Disorder Paternal Grandmother      brain tumor     GASTROINTESTINAL DISEASE Other      cousin on dads side has celiac     Thyroid Disease Maternal Aunt        Review of Systems - As per HPI and PMHx, otherwise system review of the head and neck negative.    Physical Exam  Temp 97  F (36.1  C) (Temporal)  Ht 1.702 m (5' 7\")  Wt 80.6 kg (177 lb 9.6 oz)  BMI 27.82 kg/m2  BMI - Body mass index is 27.82 kg/(m^2).    General - The patient is well nourished and well developed, and appears to have good nutritional status.  Alert and oriented to person and place, answers questions and cooperates with examination appropriately.     SKIN - No suspicious lesions or rashes.  Respiration - No respiratory distress.    Head and Face - Normocephalic and atraumatic, with no gross asymmetry noted of the contour of the facial features.  The facial nerve is intact, with strong symmetric movements.    Voice and Breathing - The patient was breathing comfortably without the use of accessory muscles. There was no wheezing, stridor, or stertor.  The patients voice was clear and strong, and had appropriate pitch and quality.    Ears - Bilateral pinna and EACs with normal appearing overlying skin. Tympanic membrane intact with good mobility on pneumatic otoscopy bilaterally. Bony landmarks of the ossicular chain are normal. The tympanic membranes are normal in appearance. No retraction, perforation, or masses.  No fluid or purulence was seen in the external canal or the middle ear.     Eyes - Extraocular movements intact.  Sclera were not icteric or injected, conjunctiva were pink and moist.    Mouth - Examination of the oral cavity showed pink, healthy oral mucosa. No lesions or ulcerations noted.  The tongue was mobile and midline, and the " dentition were in good condition.      Throat - The walls of the oropharynx were smooth, pink, moist, symmetric, and had no lesions or ulcerations.  The tonsillar pillars and soft palate were symmetric.  The uvula was midline on elevation.    Neck - Normal midline excursion of the laryngotracheal complex during swallowing.  Full range of motion on passive movement.  Palpation of the occipital, submental, submandibular, internal jugular chain, and supraclavicular nodes did not demonstrate any abnormal lymph nodes or masses.  The carotid pulse was palpable bilaterally.  Palpation of the thyroid was soft and smooth, with no nodules or goiter appreciated.  The trachea was mobile and midline.    Nose - External contour is symmetric, no gross deflection or scars.  Nasal mucosa is pink and moist with no abnormal mucus.  The septum was deviated to the Left and obstructed in the left nostril, turbinates large size right is larger then Left.  No polyps, masses, or purulence noted on examination.    CT scan reviewed in clinic today.    Frontal sinuses: Minimal mucosal thickening left frontal sinus  inferiorly and medially. Obstructed frontal recess. Right frontal  sinus and frontal recess are clear.     Ethmoid sinuses: Mild mucosal thickening left anterior ethmoid air  cells. Otherwise normal.     Right maxillary sinus: Minimal mucosal thickening superiorly. Patent  ostiomeatal unit.     Left maxillary sinus: Mild mucosal thickening inferiorly up to 0.5  cm. Minimal mucosal thickening superiorly. Patent ostiomeatal unit.     Sphenoid sinus: Minimal mucosal thickening anteriorly and superiorly  on the right. Patent sphenoid ostia.     Nasal septum: Deviated to the left with a spur connecting the septum  to the lateral wall.     Turbinates and nasal cavity: Normal.      Laminae papyracea and cribriform plate: Normal.         Performed in clinic today:  To further evaluate the nasal cavity, I performed rigid nasal endoscopy.  I  first sprayed the nasal cavity bilaterally with a mix of lidocaine and neosynephrine.  I then began on the left side using a 2.7mm, 30 degree rigid nasal endoscope.  The septum was deviated and the nasal airway was open.  No abnormal secretions, purulence, or polyps were noted. The left middle turbinate and middle meatus were clearly visualized and Large and slightly inflamed in appearance. She has a bony spur on the left side. Looking up, the olfactory cleft was unobstructed.  Going further back, the sphenoethmoid recess was normal in appearance, with healthy appearing mucosa on the face of the sphenoid.  The nasopharynx was unremarkable, and the eustachian tube opening on this side was unobstructed.    I then turned my attention to the right side.  Once again, the septum was deviated, and the airway was open.  No abnormal secretions, purulence, polyps were noted.  The right middle turbinate and middle meatus were clearly visualized and large in appearance.  Looking up, the olfactory cleft was unobstructed.  Going further back the right sphenoethmoid recess was normal in appearance, and eustachian tube opening was unobstructed.   [id number] [facility]      ASSESSMENT/PLAN:  Nithya Tolentino is a 41 year old female with Chronic sinusitis.    Since the symptoms have persisted longer than 10 days and she has been treated for 5 sinus infections over the last few months we discussed Septoplasty and Turbinoplasty vs. fromer procedure with conservative FESS ant ethmoidectomy and maxillary antrostomy.  The alternate would be to continue treating infections as they happen, continue on Allergy medication, and follow up as needed.  I explained the risks, benefits, and alternatives of Septoplasty and Turbinoplasty/+/- FESS .  including, but not, limited to: bleeding, possible need to go back to the OR to control bleeding, blood transfusion, pain, vision issues,  CSF  rhinorrhea. I also discussed the risks of general  anesthesia including MI, stroke, and even death. I will also examine the adenoid pad at the time of surgery and remove if enlarged or infected. The patient is unsure about scheduling the procedure today.  She will discuss information with her  and call us if needed.    Progress note was partially captured by Layla Cohen MA and reviewed/addended by Dr. Reed for accuracy and content.        Fer Reed MD

## 2017-04-17 ENCOUNTER — OFFICE VISIT (OUTPATIENT)
Dept: OTOLARYNGOLOGY | Facility: CLINIC | Age: 42
End: 2017-04-17
Payer: COMMERCIAL

## 2017-04-17 VITALS — HEIGHT: 67 IN | BODY MASS INDEX: 27.88 KG/M2 | WEIGHT: 177.6 LBS | TEMPERATURE: 97 F

## 2017-04-17 DIAGNOSIS — J32.0 CHRONIC MAXILLARY SINUSITIS: Primary | ICD-10-CM

## 2017-04-17 PROCEDURE — 31231 NASAL ENDOSCOPY DX: CPT | Performed by: OTOLARYNGOLOGY

## 2017-04-17 PROCEDURE — 99203 OFFICE O/P NEW LOW 30 MIN: CPT | Mod: 25 | Performed by: OTOLARYNGOLOGY

## 2017-04-17 ASSESSMENT — PAIN SCALES - GENERAL: PAINLEVEL: MILD PAIN (3)

## 2017-04-17 NOTE — MR AVS SNAPSHOT
"              After Visit Summary   4/17/2017    Nithya Tolentino    MRN: [id number]           Patient Information     Date Of Birth          1975        Visit Information        Provider Department      4/17/2017 8:30 AM Fer Reed MD [facility]        Today's Diagnoses     Chronic maxillary sinusitis    -  1       Follow-ups after your visit        Who to contact     If you have questions or need follow up information about today's clinic visit or your schedule please contact [facility] directly at [phone].  Normal or non-critical lab and imaging results will be communicated to you by MyChart, letter or phone within 4 business days after the clinic has received the results. If you do not hear from us within 7 days, please contact the clinic through [facility] or phone. If you have a critical or abnormal lab result, we will notify you by phone as soon as possible.  Submit refill requests through [facility] or call your pharmacy and they will forward the refill request to us. Please allow 3 business days for your refill to be completed.          Additional Information About Your Visit        MyChart Information     [facility] gives you secure access to your electronic health record. If you see a primary care provider, you can also send messages to your care team and make appointments. If you have questions, please call your primary care clinic.  If you do not have a primary care provider, please call [phone] and they will assist you.        Care EveryWhere ID     This is your Care EveryWhere ID. This could be used by other organizations to access your [facility] medical records  [id number]        Your Vitals Were     Temperature Height BMI (Body Mass Index)             97  F (36.1  C) (Temporal) 1.702 m (5' 7\") 27.82 kg/m2          Blood Pressure from Last 3 Encounters:   03/06/17 110/64   01/05/17 112/80   12/19/16 116/66    Weight from Last 3 Encounters:   04/17/17 80.6 " kg (177 lb 9.6 oz)   03/06/17 79.7 kg (175 lb 11.2 oz)   01/05/17 78.5 kg (173 lb)              We Performed the Following     Nasal Endoscopy, Diag        Primary Care Provider Office Phone # Fax #    Janessa Jackie Fontenot [phone]        [facility] [address] [place] [address]        Thank you!     Thank you for choosing [facility]  for your care. Our goal is always to provide you with excellent care. Hearing back from our patients is one way we can continue to improve our services. Please take a few minutes to complete the written survey that you may receive in the mail after your visit with us. Thank you!             Your Updated Medication List - Protect others around you: Learn how to safely use, store and throw away your medicines at www.disposemymeds.org.          This list is accurate as of: 4/17/17  4:36 PM.  Always use your most recent med list.                   Brand Name Dispense Instructions for use    albuterol 108 (90 BASE) MCG/ACT Inhaler    PROAIR HFA/PROVENTIL HFA/VENTOLIN HFA    1 Inhaler    Inhale 2 puffs into the lungs every 6 hours as needed for shortness of breath / dyspnea or wheezing       azelastine 0.1 % spray    ASTELIN    1 Bottle    Spray 1-2 sprays into both nostrils 2 times daily       cyanocobalamin 1000 MCG tablet    vitamin  B-12     Take 1,000 mcg by mouth daily       doxycycline Monohydrate 100 MG Tabs      TAKE 1 TABLET (100 MG) BY MOUTH TWICE A DAY WITH BREAKFAST AND DINNER.       fexofenadine 180 MG tablet    ALLEGRA    30 tablet    Take 1 tablet (180 mg) by mouth daily       IRON SUPPLEMENT PO      Take 65 mg by mouth daily Reported on 3/6/2017       mometasone 50 MCG/ACT spray    NASONEX    1 Box    Spray 2 sprays into both nostrils daily       naproxen sodium 275 MG tablet    ANAPROX    90 tablet    Take 1 tablet (275 mg) by mouth 2 times daily (with meals)       TYLENOL 500 MG tablet   Generic drug:  acetaminophen       Take 1-2 tablets by mouth every 6 hours as needed. 2 tablets before bedtime

## 2017-04-17 NOTE — NURSING NOTE
"Chief Complaint   Patient presents with     Consult     Referring      Sinus Problem      chronic sinusitis       Initial Temp 97  F (36.1  C) (Temporal)  Ht 1.702 m (5' 7\")  Wt 80.6 kg (177 lb 9.6 oz)  BMI 27.82 kg/m2 Estimated body mass index is 27.82 kg/(m^2) as calculated from the following:    Height as of this encounter: 1.702 m (5' 7\").    Weight as of this encounter: 80.6 kg (177 lb 9.6 oz).  Medication Reconciliation: complete   Myra Maguire CMA        "

## 2017-05-15 ENCOUNTER — TELEPHONE (OUTPATIENT)
Dept: FAMILY MEDICINE | Facility: OTHER | Age: 42
End: 2017-05-15

## 2017-05-15 DIAGNOSIS — J32.9 RHINOSINUSITIS: ICD-10-CM

## 2017-05-15 DIAGNOSIS — J32.8 OTHER CHRONIC SINUSITIS: ICD-10-CM

## 2017-05-15 RX ORDER — MOMETASONE FUROATE MONOHYDRATE 50 UG/1
2 SPRAY, METERED NASAL DAILY
Qty: 1 BOX | Refills: 9 | Status: SHIPPED | OUTPATIENT
Start: 2017-05-15 | End: 2018-06-04

## 2017-05-15 NOTE — TELEPHONE ENCOUNTER
Mometasone Furoate nasal Spray      Last Written Prescription Date: 3/6/2017      Last Fill Quantity: 1,  # refills: 11               Switching to Home Delivery  Last Office Visit with FMNELSON, SYLWIAP or [facility] prescribing provider: 3/6/2017

## 2017-05-15 NOTE — TELEPHONE ENCOUNTER
Rx was sent 03/06/2017 for 1 Box tabs and 11 refills. Patient should have medication through 03/06/2017.   Patient is switching to a Home Delivery Pharmacy. Transferred remaining refills to new pharmacy.   Maria Ines Gandhi RN, BSN

## 2017-09-28 ENCOUNTER — OFFICE VISIT (OUTPATIENT)
Dept: OBGYN | Facility: OTHER | Age: 42
End: 2017-09-28
Payer: COMMERCIAL

## 2017-09-28 VITALS
HEART RATE: 68 BPM | DIASTOLIC BLOOD PRESSURE: 68 MMHG | WEIGHT: 170.5 LBS | BODY MASS INDEX: 26.7 KG/M2 | SYSTOLIC BLOOD PRESSURE: 112 MMHG

## 2017-09-28 DIAGNOSIS — N94.6 DYSMENORRHEA: Primary | ICD-10-CM

## 2017-09-28 PROCEDURE — 99213 OFFICE O/P EST LOW 20 MIN: CPT | Performed by: ADVANCED PRACTICE MIDWIFE

## 2017-09-28 RX ORDER — NORETHINDRONE ACETATE AND ETHINYL ESTRADIOL 1MG-20(21)
1 KIT ORAL DAILY
Qty: 84 TABLET | Refills: 3 | Status: SHIPPED | OUTPATIENT
Start: 2017-09-28 | End: 2018-09-04

## 2017-09-28 RX ORDER — NAPROXEN 500 MG/1
500 TABLET ORAL 2 TIMES DAILY PRN
Qty: 60 TABLET | Refills: 3 | Status: SHIPPED | OUTPATIENT
Start: 2017-09-28 | End: 2022-04-05

## 2017-09-28 NOTE — PATIENT INSTRUCTIONS
Birth control pills mimic a regular 28-day monthly cycle. For the first 21 days, you take  pills containing hormones. For the last seven days, you take pills without hormones. While you're taking the hormone free pills, you bleed vaginally, as if you were having a regular menstrual period.   Take your oral contraceptive at the same time every day.  Oral contraceptives will work only as long as they are taken regularly. Continue to take a pill every day even if you are spotting or bleeding, have an upset stomach, or do not think that you are likely to become pregnant. Do not stop taking oral contraceptives without talking to a health care provider.  Side Effects of Oral Contraceptives:   Some side effects can be serious. The following symptoms are uncommon, but if you experience any of them, call the clinic immediately or go to the Emergency Room  severe headache  speech problems  dizziness or faintness  weakness or numbness of an arm or leg  crushing chest pain or chest heaviness  coughing up blood  shortness of breath  pain, warmth, or heaviness in the back of the lower leg  partial or complete loss of vision  double vision  severe stomach pain  yellowing of the skin or eyes  dark-colored urine  light-colored stool  swelling in one foot or lower leg with a warm red tender area  menstrual bleeding that is unusually heavy or that lasts for longer than 7 days in a row  Other side effects that are not serious include: weight gain (up to 5 lbs), breast tenderness, skin changes, mild nausea, changes in libido and mood changes.  In the first three months of pill use you may not always bleed when you are taking the hormone free pills.  This is normal unless it continues into the 4th month.  If you are still bleeding in the 4th month while taking the pills containing hormones please call to talk to your provider.  You may need to use a backup method of birth control such as condoms if you vomit or have diarrhea while you  are taking an oral contraceptive.

## 2017-09-28 NOTE — NURSING NOTE
"Chief Complaint   Patient presents with     Abnormal Bleeding Problem     painful periods       Initial /68 (BP Location: Left arm, Patient Position: Chair, Cuff Size: Adult Regular)  Pulse 68  Wt 170 lb 8 oz (77.3 kg)  LMP 09/15/2017 (Exact Date)  BMI 26.7 kg/m2 Estimated body mass index is 26.7 kg/(m^2) as calculated from the following:    Height as of 4/17/17: 5' 7\" (1.702 m).    Weight as of this encounter: 170 lb 8 oz (77.3 kg).  Medication Reconciliation: complete   Lana Lindsay CMA      "

## 2017-09-28 NOTE — PROGRESS NOTES
Nithya Tolentino is a 41 year old who presents to the clinic for discussion of cycle control methods.   Has had progressive dysmenorrhea.  We have discussed this in the past and possible treatments which she didn't feel she wanted to pursue at the time.  Now she is having periods that NSAIDS and tylenol are not working for.   Doesn't feel that she wants to risk having to call in sick at work.  Flow is not the issue but the cramping.     Histories reviewed and updated  Past Medical History:   Diagnosis Date     Dysmenorrhea      Environmental allergies      Past Surgical History:   Procedure Laterality Date     NO HISTORY OF SURGERY       Social History     Social History     Marital status:      Spouse name: N/A     Number of children: N/A     Years of education: N/A     Occupational History     Not on file.     Social History Main Topics     Smoking status: Never Smoker     Smokeless tobacco: Never Used     Alcohol use Yes      Comment: occ     Drug use: No     Sexual activity: Yes     Partners: Male     Birth control/ protection: Male Surgical      Comment:  had testicular cancer     Other Topics Concern     Not on file     Social History Narrative    Dairy/d 1 servings/d.     Caffeine 1 servings/d    Exercise 1-2 x week    Sunscreen used - Yes    Seatbelts used - Yes    Working smoke/CO detectors in the home - No    Guns stored in the home - Yes    Self Breast Exams - Sometimes    Self Testicular Exam - NOT APPLICABLE    Eye Exam up to date - No    Dental Exam up to date - Yes    Pap Smear up to date - Yes    Mammogram up to date - NOT APPLICABLE    PSA up to date - NOT APPLICABLE    Dexa Scan up to date - NOT APPLICABLE    Flex Sig / Colonoscopy up to date - NOT APPLICABLE    Immunizations up to date - Yes    Abuse: Current or Past(Physical, Sexual or Emotional)- No    Do you feel safe in your environment - Yes     Family History   Problem Relation Age of Onset     Thyroid Disease Mother       Arthritis Maternal Grandmother      osteoarthritis      CANCER Maternal Grandfather      bone     Neurologic Disorder Paternal Grandmother      brain tumor     GASTROINTESTINAL DISEASE Other      cousin on dads side has celiac     Thyroid Disease Maternal Aunt        Menstrual History    Menses every 28 days.  Length of menses: 4 days  Menstrual description: crampy    Denies the following contraindications to OCP use:  Migraine and migraine with aura  Smoking  Liver disease  Personal and family history of blood clot or stroke under the age of 50.  History of heart disease  History of breast cancer  History of lupus  History of hypertension      CONSTITUTIONAL: Healthy, alert, in no apparent distress.    GI: NEGATIVE  : see above  NEURO: her mom has recently been diagnosed with possible dementia of some type at age 62 and she is worried for herself and is requesting brain imaging for a baseline    EXAM:  /68 (BP Location: Left arm, Patient Position: Chair, Cuff Size: Adult Regular)  Pulse 68  Wt 170 lb 8 oz (77.3 kg)  LMP 09/15/2017 (Exact Date)  BMI 26.7 kg/m2          ASSESSMENT/PLAN:  There are no contraindications to the use of FERNANDO    (N94.6) Dysmenorrhea  (primary encounter diagnosis)  Comment:   Plan: norethindrone-ethinyl estradiol (JUNEL FE 1/20)        1-20 MG-MCG per tablet, naproxen (NAPROSYN) 500        MG tablet            We discussed the use of rings, pills, patches, depo, nexplanon and IUDs.   She is most interested in Pills.     The use of the oral contraceptive pill has been fully discussed with the patient. This includes the proper method to initiate  and continue the pill, the need for regular compliance to ensure adequate contraceptive effect, the physiology which makes the pill effective, the instructions for what to do in event of a missed pill, and warnings about anticipated minor side effects such as breakthrough spotting, nausea, breast tenderness, weight changes, acne,  headaches, etc. She was informed of the irregular bleeding pattern that can occur when the pill is first started or a new form is changed over for the first 2-3 months.  She has been told of the more serious potential side effects such as MI, stroke, and deep vein thrombosis, all of which are very unlikely.  She has been asked to report any signs of such serious problems immediately.   She understands and wishes to take the medication as prescribed.    We also discussed the possibility of extended cycling or continuous cycling if the pills don't work as expected to control her cramping    It was recommended that she discuss her desire for brain imaging with her PCP. She will plan to have her annual with Nithya Arevalo and discuss this with her.     Visit length 20 min with >50% spent in counseling regarding cycle control, risks benefits.  Time noted does not include any time required to complete procedures.

## 2017-09-28 NOTE — MR AVS SNAPSHOT
After Visit Summary   9/28/2017    Nithya Tolentino    MRN: [id number]           Patient Information     Date Of Birth          1975        Visit Information        Provider Department      9/28/2017 12:15 PM May Salas APRN [facility]'s Diagnoses     Dysmenorrhea    -  1      Care Instructions    Birth control pills mimic a regular 28-day monthly cycle. For the first 21 days, you take  pills containing hormones. For the last seven days, you take pills without hormones. While you're taking the hormone free pills, you bleed vaginally, as if you were having a regular menstrual period.   Take your oral contraceptive at the same time every day.  Oral contraceptives will work only as long as they are taken regularly. Continue to take a pill every day even if you are spotting or bleeding, have an upset stomach, or do not think that you are likely to become pregnant. Do not stop taking oral contraceptives without talking to a health care provider.  Side Effects of Oral Contraceptives:   Some side effects can be serious. The following symptoms are uncommon, but if you experience any of them, call the clinic immediately or go to the Emergency Room  severe headache  speech problems  dizziness or faintness  weakness or numbness of an arm or leg  crushing chest pain or chest heaviness  coughing up blood  shortness of breath  pain, warmth, or heaviness in the back of the lower leg  partial or complete loss of vision  double vision  severe stomach pain  yellowing of the skin or eyes  dark-colored urine  light-colored stool  swelling in one foot or lower leg with a warm red tender area  menstrual bleeding that is unusually heavy or that lasts for longer than 7 days in a row  Other side effects that are not serious include: weight gain (up to 5 lbs), breast tenderness, skin changes, mild nausea, changes in libido and mood changes.  In the first three months of pill use you  may not always bleed when you are taking the hormone free pills.  This is normal unless it continues into the 4th month.  If you are still bleeding in the 4th month while taking the pills containing hormones please call to talk to your provider.  You may need to use a backup method of birth control such as condoms if you vomit or have diarrhea while you are taking an oral contraceptive.              Follow-ups after your visit        Follow-up notes from your care team     Return if symptoms worsen or fail to improve.      Who to contact     If you have questions or need follow up information about today's clinic visit or your schedule please contact [facility] directly at [phone].  Normal or non-critical lab and imaging results will be communicated to you by MyChart, letter or phone within 4 business days after the clinic has received the results. If you do not hear from us within 7 days, please contact the clinic through Tranzlogichart or phone. If you have a critical or abnormal lab result, we will notify you by phone as soon as possible.  Submit refill requests through [employer] or call your pharmacy and they will forward the refill request to us. Please allow 3 business days for your refill to be completed.          Additional Information About Your Visit        TranzlogicharBunch Information     [employer] gives you secure access to your electronic health record. If you see a primary care provider, you can also send messages to your care team and make appointments. If you have questions, please call your primary care clinic.  If you do not have a primary care provider, please call [phone] and they will assist you.        Care EveryWhere ID     This is your Care EveryWhere ID. This could be used by other organizations to access your [place] medical records  [id number]        Your Vitals Were     Pulse Last Period BMI (Body Mass Index)             68 09/15/2017 (Exact Date) 26.7 kg/m2          Blood  Pressure from Last 3 Encounters:   09/28/17 112/68   03/06/17 110/64   01/05/17 112/80    Weight from Last 3 Encounters:   09/28/17 170 lb 8 oz (77.3 kg)   04/17/17 177 lb 9.6 oz (80.6 kg)   03/06/17 175 lb 11.2 oz (79.7 kg)              Today, you had the following     No orders found for display         Today's Medication Changes          These changes are accurate as of: 9/28/17  1:29 PM.  If you have any questions, ask your nurse or doctor.               Start taking these medicines.        Dose/Directions    naproxen 500 MG tablet   Commonly known as:  NAPROSYN   Used for:  Dysmenorrhea   Started by:  May Salas APRN CNM        Dose:  500 mg   Take 1 tablet (500 mg) by mouth 2 times daily as needed for moderate pain   Quantity:  60 tablet   Refills:  3       norethindrone-ethinyl estradiol 1-20 MG-MCG per tablet   Commonly known as:  JUNEL FE 1/20   Used for:  Dysmenorrhea   Started by:  May Salas APRN CNKALA        Dose:  1 tablet   Take 1 tablet by mouth daily   Quantity:  84 tablet   Refills:  3            Where to get your medicines      These medications were sent to [facility] - [place], [place] - [address]  [address], [place] [address]     Phone:  [phone]     naproxen 500 MG tablet    norethindrone-ethinyl estradiol 1-20 MG-MCG per tablet                Primary Care Provider Office Phone # Fax #    [facility] [phone]       [address] [place] [address]        Equal Access to Services     [facility] Hospital[facility] AH: Chelsea Wilson, francisco martinez, qacurtis cartwright. So [facility] [phone].    ATENCIÓN: Si habla español, tiene a snow disposición servicios gratuitos de asistencia lingüística. Priya al [phone].    We comply with applicable federal civil rights laws and [place] laws. We do not discriminate on the basis of race, color, national origin, age,  disability sex, sexual orientation or gender identity.            Thank you!     Thank you for choosing [facility]  for your care. Our goal is always to provide you with excellent care. Hearing back from our patients is one way we can continue to improve our services. Please take a few minutes to complete the written survey that you may receive in the mail after your visit with us. Thank you!             Your Updated Medication List - Protect others around you: Learn how to safely use, store and throw away your medicines at www.disposemymeds.org.          This list is accurate as of: 9/28/17  1:29 PM.  Always use your most recent med list.                   Brand Name Dispense Instructions for use Diagnosis    calcium-magnesium 500-250 MG Tabs per tablet    CALMAG     Take 1 tablet by mouth daily        cyanocobalamin 1000 MCG tablet    vitamin  B-12     Take 1,000 mcg by mouth daily        fexofenadine 180 MG tablet    ALLEGRA    30 tablet    Take 1 tablet (180 mg) by mouth daily    Other chronic sinusitis, Rhinosinusitis       IRON SUPPLEMENT PO      Take 65 mg by mouth daily Reported on 3/6/2017        mometasone 50 MCG/ACT spray    NASONEX    1 Box    Spray 2 sprays into both nostrils daily    Other chronic sinusitis, Rhinosinusitis       naproxen 500 MG tablet    NAPROSYN    60 tablet    Take 1 tablet (500 mg) by mouth 2 times daily as needed for moderate pain    Dysmenorrhea       naproxen sodium 275 MG tablet    ANAPROX    90 tablet    Take 1 tablet (275 mg) by mouth 2 times daily (with meals)    Dysmenorrhea       norethindrone-ethinyl estradiol 1-20 MG-MCG per tablet    JUNEL FE 1/20    84 tablet    Take 1 tablet by mouth daily    Dysmenorrhea       TYLENOL 500 MG tablet   Generic drug:  acetaminophen      Take 1-2 tablets by mouth every 6 hours as needed. 2 tablets before bedtime

## 2017-10-02 ENCOUNTER — MYC MEDICAL ADVICE (OUTPATIENT)
Dept: OBGYN | Facility: OTHER | Age: 42
End: 2017-10-02

## 2017-10-06 ENCOUNTER — OFFICE VISIT (OUTPATIENT)
Dept: URGENT CARE | Facility: RETAIL CLINIC | Age: 42
End: 2017-10-06
Payer: COMMERCIAL

## 2017-10-06 VITALS
OXYGEN SATURATION: 98 % | HEART RATE: 64 BPM | TEMPERATURE: 98 F | SYSTOLIC BLOOD PRESSURE: 113 MMHG | DIASTOLIC BLOOD PRESSURE: 62 MMHG

## 2017-10-06 DIAGNOSIS — J01.90 ACUTE SINUSITIS WITH COEXISTING CONDITION, NEED PROPHYLACTIC TREATMENT: Primary | ICD-10-CM

## 2017-10-06 PROCEDURE — 99213 OFFICE O/P EST LOW 20 MIN: CPT | Performed by: PHYSICIAN ASSISTANT

## 2017-10-06 RX ORDER — DOXYCYCLINE HYCLATE 100 MG
100 TABLET ORAL 2 TIMES DAILY
Qty: 14 TABLET | Refills: 0 | Status: SHIPPED | OUTPATIENT
Start: 2017-10-08 | End: 2017-10-15

## 2017-10-06 RX ORDER — PREDNISONE 20 MG/1
40 TABLET ORAL DAILY
Qty: 10 TABLET | Refills: 0 | Status: SHIPPED | OUTPATIENT
Start: 2017-10-06 | End: 2017-10-11

## 2017-10-06 NOTE — PATIENT INSTRUCTIONS
Your symptoms appear to be viral at this time.  Secondary bacterial infections only happen in about 0.5-2% of cases.  Antibiotics are recommended only if you do not have any relief from your symptoms in 10 days or symptoms worsen after 7 days.  Nasal congestion often starts clear then turns yellow or green towards the end- this is not a sign of a bacterial infection.  Doxycycline twice daily for 7 days- start this if your symptoms worsen in 3 days or do not improve in 6 days.  -stop calcium/magnesium while taking Doxycycline    Prednisone 40 mg daily for 5 days.  Continue Nasonex 2 sprays in each nostril daily until symptoms resolve, then continue 1 spray in each nostril for at least 5 more days.  Mucinex D every 12 hours.  Allegra in the morning, Benadryl (diaphenhydramine) in the evening.  Afrin (oxymetazoline) nasal spray twice daily for 3 days. Stop after 3 days.  Take Tylenol or an NSAID such as ibuprofen or naproxen as needed for pain.  Sit in the bathroom with the door closed and hot shower running to loosen mucus.  Contact primary care clinic if you do not have any relief from your symptoms after 10 days.  Present to emergency room for significantly increasing pain, persistent high fever >102F, swelling/redness around your eyes, changes in your vision or ability to move your eyes, altered mental status or a severe headache.

## 2017-10-06 NOTE — NURSING NOTE
"Chief Complaint   Patient presents with     Sinus Problem     began as a cold 4 days ago; pressure around forehead     Throat Problem     began as scratchy throat; feels tight in lower throat     Fatigue     Nasal Congestion     very congested, cannot breath through nose     Cough     Ent Problem     pain, pressure; both ears       Initial /62 (BP Location: Left arm)  Pulse 64  Temp 98  F (36.7  C) (Oral)  LMP 09/15/2017 (Exact Date)  SpO2 98% Estimated body mass index is 26.7 kg/(m^2) as calculated from the following:    Height as of 4/17/17: 5' 7\" (1.702 m).    Weight as of 9/28/17: 170 lb 8 oz (77.3 kg).  Medication Reconciliation: complete  "

## 2017-10-06 NOTE — MR AVS SNAPSHOT
After Visit Summary   10/6/2017    Nithya Tolentino    MRN: [id number]           Patient Information     Date Of Birth          1975        Visit Information        Provider Department      10/6/2017 9:50 AM Annabella Kaur PA-C [facility]        Today's Diagnoses     Acute sinusitis with coexisting condition, need prophylactic treatment    -  1      Care Instructions    Your symptoms appear to be viral at this time.  Secondary bacterial infections only happen in about 0.5-2% of cases.  Antibiotics are recommended only if you do not have any relief from your symptoms in 10 days or symptoms worsen after 7 days.  Nasal congestion often starts clear then turns yellow or green towards the end- this is not a sign of a bacterial infection.  Doxycycline twice daily for 7 days- start this if your symptoms worsen in 3 days or do not improve in 6 days.  -stop calcium/magnesium while taking Doxycycline    Prednisone 40 mg daily for 5 days.  Continue Nasonex 2 sprays in each nostril daily until symptoms resolve, then continue 1 spray in each nostril for at least 5 more days.  Mucinex D every 12 hours.  Allegra in the morning, Benadryl (diaphenhydramine) in the evening.  Afrin (oxymetazoline) nasal spray twice daily for 3 days. Stop after 3 days.  Take Tylenol or an NSAID such as ibuprofen or naproxen as needed for pain.  Sit in the bathroom with the door closed and hot shower running to loosen mucus.  Contact primary care clinic if you do not have any relief from your symptoms after 10 days.  Present to emergency room for significantly increasing pain, persistent high fever >102F, swelling/redness around your eyes, changes in your vision or ability to move your eyes, altered mental status or a severe headache.          Follow-ups after your visit        Who to contact     You can reach your care team any time of the day by calling [phone].  Notification of test results:  If you  have an abnormal lab result, we will notify you by phone as soon as possible.         Additional Information About Your Visit        LC Style[employer]comhart Information     [employer] gives you secure access to your electronic health record. If you see a primary care provider, you can also send messages to your care team and make appointments. If you have questions, please call your primary care clinic.  If you do not have a primary care provider, please call [phone] and they will assist you.        Care EveryWhere ID     This is your Care EveryWhere ID. This could be used by other organizations to access your [place] medical records  [id number]        Your Vitals Were     Pulse Temperature Last Period Pulse Oximetry          64 98  F (36.7  C) (Oral) 09/15/2017 (Exact Date) 98%         Blood Pressure from Last 3 Encounters:   10/06/17 113/62   09/28/17 112/68   03/06/17 110/64    Weight from Last 3 Encounters:   09/28/17 170 lb 8 oz (77.3 kg)   04/17/17 177 lb 9.6 oz (80.6 kg)   03/06/17 175 lb 11.2 oz (79.7 kg)              Today, you had the following     No orders found for display         Today's Medication Changes          These changes are accurate as of: 10/6/17 10:46 AM.  If you have any questions, ask your nurse or doctor.               Start taking these medicines.        Dose/Directions    doxycycline 100 MG tablet   Commonly known as:  VIBRA-TABS   Used for:  Acute sinusitis with coexisting condition, need prophylactic treatment        Dose:  100 mg   Start taking on:  10/8/2017   Take 1 tablet (100 mg) by mouth 2 times daily for 7 days   Quantity:  14 tablet   Refills:  0       predniSONE 20 MG tablet   Commonly known as:  DELTASONE   Used for:  Acute sinusitis with coexisting condition, need prophylactic treatment        Dose:  40 mg   Take 2 tablets (40 mg) by mouth daily for 5 days   Quantity:  10 tablet   Refills:  0         Stop taking these medicines if you haven't already. Please contact your care team if  you have questions.     naproxen sodium 275 MG tablet   Commonly known as:  ANAPROX                Where to get your medicines      These medications were sent to [place] [address] [facility] [place] - [address] [facility]  [address] [facility] [address]     Phone:  [phone]     doxycycline 100 MG tablet    predniSONE 20 MG tablet                Primary Care Provider Office Phone # Fax #    [facility] [phone]       [address] [place] [address]        Equal Access to Services     JESSICA BARTON : Hadii aad ku hadasho Soomaali, waaxda luqadaha, qaybta kaalmada adeegyada, waxay charlottein hayalessandron migel guillermo . So [facility] [phone].    ATENCIÓN: Si habla español, tiene a snow disposición servicios gratuitos de asistencia lingüística. [facility] [phone].    We comply with applicable federal civil rights laws and [place] laws. We do not discriminate on the basis of race, color, national origin, age, disability, sex, sexual orientation, or gender identity.            Thank you!     Thank you for choosing [facility]  for your care. Our goal is always to provide you with excellent care. Hearing back from our patients is one way we can continue to improve our services. Please take a few minutes to complete the written survey that you may receive in the mail after your visit with us. Thank you!             Your Updated Medication List - Protect others around you: Learn how to safely use, store and throw away your medicines at www.disposemymeds.org.          This list is accurate as of: 10/6/17 10:46 AM.  Always use your most recent med list.                   Brand Name Dispense Instructions for use Diagnosis    MONSERRAT SELTZER PLUS PO           calcium-magnesium 500-250 MG Tabs per tablet    CALMAG     Take 1 tablet by mouth daily        cyanocobalamin 1000 MCG tablet    vitamin  B-12     Take 1,000 mcg by mouth daily        doxycycline 100 MG tablet   Start  taking on:  10/8/2017    VIBRA-TABS    14 tablet    Take 1 tablet (100 mg) by mouth 2 times daily for 7 days    Acute sinusitis with coexisting condition, need prophylactic treatment       fexofenadine 180 MG tablet    ALLEGRA    30 tablet    Take 1 tablet (180 mg) by mouth daily    Other chronic sinusitis, Rhinosinusitis       IRON SUPPLEMENT PO      Take 65 mg by mouth daily Reported on 3/6/2017        mometasone 50 MCG/ACT spray    NASONEX    1 Box    Spray 2 sprays into both nostrils daily    Other chronic sinusitis, Rhinosinusitis       naproxen 500 MG tablet    NAPROSYN    60 tablet    Take 1 tablet (500 mg) by mouth 2 times daily as needed for moderate pain    Dysmenorrhea       norethindrone-ethinyl estradiol 1-20 MG-MCG per tablet    JUNEL FE 1/20    84 tablet    Take 1 tablet by mouth daily    Dysmenorrhea       predniSONE 20 MG tablet    DELTASONE    10 tablet    Take 2 tablets (40 mg) by mouth daily for 5 days    Acute sinusitis with coexisting condition, need prophylactic treatment       TYLENOL 500 MG tablet   Generic drug:  acetaminophen      Take 1-2 tablets by mouth every 6 hours as needed. 2 tablets before bedtime

## 2017-10-10 NOTE — PROGRESS NOTES
SUBJECTIVE:   Nithya Tolentino is a 41 year old female who presents to clinic today for the following health issues:  Pt. Taking this medication Azelastine 0.1% she want to discuss with you for this. Dr Reed told her she did not need this since she was on steroid nasal spray    Acute Illness   Acute illness concerns: sinus infection  Onset: started on Oct 2/17     Fever: no     Chills/Sweats: no     Headache (location?): not at this time, head feels heavy     Sinus Pressure:YES- across face and eyes     Conjunctivitis:  YES- both eye    Ear Pain: no    Rhinorrhea: YES, PND    Congestion: YES    Sore Throat: YES- sometime, did lose voice last week      Cough: YES whole time since oct 2, from PND    Wheeze: no     Decreased Appetite: not     Nausea: no     Vomiting: no     Diarrhea:  no     Dysuria/Freq.: no     Fatigue/Achiness: YES    Sick/Strep Exposure: YES     Therapies Tried and outcome: she try taking medication for sinus and essential oil.         Problem list and histories reviewed & adjusted, as indicated.  Additional history: she did see ENT, she was told she could do elective procedure though she did not want to proceed at this time     BP Readings from Last 3 Encounters:   10/12/17 110/60   10/06/17 113/62   09/28/17 112/68    Wt Readings from Last 3 Encounters:   10/12/17 173 lb 12.8 oz (78.8 kg)   09/28/17 170 lb 8 oz (77.3 kg)   04/17/17 177 lb 9.6 oz (80.6 kg)                  Labs reviewed in EPIC      Reviewed and updated as needed this visit by clinical staff     Reviewed and updated as needed this visit by Provider         ROS:  As above    OBJECTIVE:     /60  Pulse 68  Resp 16  Wt 173 lb 12.8 oz (78.8 kg)  LMP 09/15/2017 (Exact Date)  BMI 27.22 kg/m2  Body mass index is 27.22 kg/(m^2).  GENERAL: healthy, alert and no distress  EYES: Eyes grossly normal to inspection  HENT: normal cephalic/atraumatic, ear canals and TM's normal, nose and mouth without ulcers or lesions, nasal  mucosa edematous , oropharynx clear, oral mucous membranes moist and sinuses: maxillary tenderness on bilaterally  NECK: no adenopathy, no asymmetry, masses, or scars and thyroid normal to palpation  RESP: lungs clear to auscultation - no rales, rhonchi or wheezes  CV: regular rate and rhythm, normal S1 S2, no S3 or S4, no murmur, click or rub, no peripheral edema and peripheral pulses strong  MS: no gross musculoskeletal defects noted, no edema    Diagnostic Test Results:  none     ASSESSMENT/PLAN:         1. Acute non-recurrent maxillary sinusitis  Use otc meds as discussed, reviewed her various boxes of meds   - azithromycin (ZITHROMAX) 250 MG tablet; Two tablets first day, then one tablet daily for four days.  Dispense: 6 tablet; Refill: 0    Recheck as needed     Nithya Crooks PA-C  [facility]  Electronically signed by Nithya Crooks PA-C

## 2017-10-12 ENCOUNTER — OFFICE VISIT (OUTPATIENT)
Dept: FAMILY MEDICINE | Facility: OTHER | Age: 42
End: 2017-10-12
Payer: COMMERCIAL

## 2017-10-12 VITALS
SYSTOLIC BLOOD PRESSURE: 110 MMHG | HEART RATE: 68 BPM | BODY MASS INDEX: 27.22 KG/M2 | RESPIRATION RATE: 16 BRPM | WEIGHT: 173.8 LBS | DIASTOLIC BLOOD PRESSURE: 60 MMHG

## 2017-10-12 DIAGNOSIS — J01.00 ACUTE NON-RECURRENT MAXILLARY SINUSITIS: ICD-10-CM

## 2017-10-12 PROCEDURE — 99213 OFFICE O/P EST LOW 20 MIN: CPT | Performed by: PHYSICIAN ASSISTANT

## 2017-10-12 RX ORDER — AZITHROMYCIN 250 MG/1
TABLET, FILM COATED ORAL
Qty: 6 TABLET | Refills: 0 | Status: SHIPPED | OUTPATIENT
Start: 2017-10-12 | End: 2017-12-21

## 2017-10-12 ASSESSMENT — PAIN SCALES - GENERAL: PAINLEVEL: NO PAIN (0)

## 2017-10-12 NOTE — PATIENT INSTRUCTIONS
Plain mucinex will only liquify your secretions, guaifenesin    When you need a decongestant, get pseudoephedrine ( sudafed)  behind the counter at the pharmacy- will need to show your ID to get       Use benadryl at bedtime only if you have excessive runny nose, otherwise just stick to your allegra for daytime use

## 2017-10-12 NOTE — NURSING NOTE
"Chief Complaint   Patient presents with     Sinus Problem     Panel Management     flu shot       Initial /60  Pulse 68  Resp 16  Wt 173 lb 12.8 oz (78.8 kg)  LMP 09/15/2017 (Exact Date)  BMI 27.22 kg/m2 Estimated body mass index is 27.22 kg/(m^2) as calculated from the following:    Height as of 4/17/17: 5' 7\" (1.702 m).    Weight as of this encounter: 173 lb 12.8 oz (78.8 kg).  Medication Reconciliation: complete   Bony Mak      "

## 2017-10-20 ENCOUNTER — MYC MEDICAL ADVICE (OUTPATIENT)
Dept: OBGYN | Facility: OTHER | Age: 42
End: 2017-10-20

## 2017-10-20 NOTE — TELEPHONE ENCOUNTER
Responded via MyChart using Amenorrhea, abnormal bleeding and adjusting to HT protocol. Maria Ines Gandhi RN, BSN

## 2017-12-19 NOTE — PATIENT INSTRUCTIONS
Tinactin  Ketoconazole   Athlete's foot cream or spray        Preventive Health Recommendations  Female Ages 40 to 49    Yearly exam:     See your health care provider every year in order to  1. Review health changes.   2. Discuss preventive care.    3. Review your medicines if your doctor prescribed any.      Get a Pap test every three years (unless you have an abnormal result and your provider advises testing more often).      If you get Pap tests with HPV test, you only need to test every 5 years, unless you have an abnormal result. You do not need a Pap test if your uterus was removed (hysterectomy) and you have not had cancer.      You should be tested each year for STDs (sexually transmitted diseases), if you're at risk.       Ask your doctor if you should have a mammogram.      Have a colonoscopy (test for colon cancer) if someone in your family has had colon cancer or polyps before age 50.       Have a cholesterol test every 5 years.       Have a diabetes test (fasting glucose) after age 45. If you are at risk for diabetes, you should have this test every 3 years.    Shots: Get a flu shot each year. Get a tetanus shot every 10 years.     Nutrition:     Eat at least 5 servings of fruits and vegetables each day.    Eat whole-grain bread, whole-wheat pasta and brown rice instead of white grains and rice.    Talk to your provider about Calcium and Vitamin D.     Lifestyle    Exercise at least 150 minutes a week (an average of 30 minutes a day, 5 days a week). This will help you control your weight and prevent disease.    Limit alcohol to one drink per day.    No smoking.     Wear sunscreen to prevent skin cancer.    See your dentist every six months for an exam and cleaning.

## 2017-12-19 NOTE — PROGRESS NOTES
"   SUBJECTIVE:   CC: Nithya Tolentino is an 42 year old woman who presents for preventive health visit.     Physical   Annual:     Getting at least 3 servings of Calcium per day::  NO    Bi-annual eye exam::  Yes    Dental care twice a year::  NO    Sleep apnea or symptoms of sleep apnea::  Daytime drowsiness    Diet::  Vegetarian/vegan and Gluten-free/reduced    Frequency of exercise::  None    Taking medications regularly::  No    Barriers to taking medications::  Problems remembering to take them    Medication side effects::  Not applicable    Additional concerns today::  YES            Hemorrhoids  Onset: Couple weeks    Description:   Pain: YES  Itching: YES    Accompanying Signs & Symptoms:  Blood streaked toilet paper: YES  Blood in stool: no   Changes in stool pattern: YES    History:   Any previous GI studies done:none  Family History of colon cancer: no     Precipitating factors:   None    Alleviating factors:  Hemorrhoid wipes, clean, maleluca essential oil    Therapies Tried and outcome: Hemorrhoid wipes, clean, maleluca essential oil      Concern - Sore on foot  Onset: 2 months    Description:   Sore on left foot, in between toes    Intensity: mild    Progression of Symptoms:  improving    Accompanying Signs & Symptoms:  Painful    Previous history of similar problem:   \"Years ago'    Precipitating factors:   Worsened by: NA    Alleviating factors:  Improved by: Goldbond powder    Therapies Tried and outcome: Goldbond powder - helps with pain    Answers for HPI/ROS submitted by the patient on 12/21/2017   PHQ-2 Score: 2      Today's PHQ-2 Score:   PHQ-2 ( 1999 Pfizer) 12/21/2017   Q1: Little interest or pleasure in doing things 1   Q2: Feeling down, depressed or hopeless 1   PHQ-2 Score 2   Q1: Little interest or pleasure in doing things Several days   Q2: Feeling down, depressed or hopeless Several days   PHQ-2 Score 2       Abuse: Current or Past(Physical, Sexual or Emotional)- No  Do you feel safe in " your environment - Yes    Social History   Substance Use Topics     Smoking status: Never Smoker     Smokeless tobacco: Never Used     Alcohol use Yes      Comment: occ     Alcohol Use 12/21/2017   If you drink alcohol, do you typically have greater than 3 drinks per day OR greater than 7 drinks per week?   Not applicable     Reviewed orders with patient.  Reviewed health maintenance and updated orders accordingly - Yes  Labs reviewed in EPIC  BP Readings from Last 3 Encounters:   12/21/17 112/70   10/12/17 110/60   10/06/17 113/62    Wt Readings from Last 3 Encounters:   12/21/17 168 lb 14.4 oz (76.6 kg)   10/12/17 173 lb 12.8 oz (78.8 kg)   09/28/17 170 lb 8 oz (77.3 kg)                      Patient under age 50, mutual decision reflected in health maintenance.        Pertinent mammograms are reviewed under the imaging tab.  History of abnormal Pap smear: NO - age 30-65 PAP every 5 years with negative HPV co-testing recommended    Reviewed and updated as needed this visit by clinical staffTobacco  Allergies  Med Hx  Surg Hx  Fam Hx  Soc Hx        Reviewed and updated as needed this visit by Provider        Past Medical History:   Diagnosis Date     Dysmenorrhea      Environmental allergies       Past Surgical History:   Procedure Laterality Date     NO HISTORY OF SURGERY         Review of Systems  C: NEGATIVE for fever, chills, change in weight  I: NEGATIVE for worrisome rashes, moles or lesions  E: NEGATIVE for vision changes or irritation  ENT: NEGATIVE for ear, mouth and throat problems  R: NEGATIVE for significant cough or SOB  B: NEGATIVE for masses, tenderness or discharge  CV: NEGATIVE for chest pain, palpitations or peripheral edema  GI: POSITIVE for hemorrhoids NEGATIVE for nausea, abdominal pain, heartburn, or change in bowel habits  : NEGATIVE for unusual urinary or vaginal symptoms. Periods are regular.  M: NEGATIVE for significant arthralgias or myalgia  N: NEGATIVE for weakness, dizziness or  "paresthesias  P: NEGATIVE for changes in mood or affect     OBJECTIVE:   /70 (BP Location: Right arm, Patient Position: Sitting, Cuff Size: Adult Regular)  Pulse 56  Temp 98.2  F (36.8  C) (Temporal)  Resp 16  Ht 5' 6.97\" (1.701 m)  Wt 168 lb 14.4 oz (76.6 kg)  LMP 11/23/2017 (Approximate)  BMI 26.48 kg/m2  Physical Exam  GENERAL: healthy, alert and no distress  EYES: Eyes grossly normal to inspection, PERRL and conjunctivae and sclerae normal  HENT: ear canals and TM's normal, nose and mouth without ulcers or lesions  NECK: no adenopathy, no asymmetry, masses, or scars and thyroid normal to palpation  RESP: lungs clear to auscultation - no rales, rhonchi or wheezes  BREAST: normal without masses, tenderness or nipple discharge and no palpable axillary masses or adenopathy  CV: regular rate and rhythm, normal S1 S2, no S3 or S4, no murmur, click or rub, no peripheral edema and peripheral pulses strong  ABDOMEN: soft, nontender, no hepatosplenomegaly, no masses and bowel sounds normal  Rectal: 2 small hemorrhoids, non-thrombosed  MS: no gross musculoskeletal defects noted, no edema  SKIN: macerated skin below bottom of left pinky toe  NEURO: Normal strength and tone, mentation intact and speech normal  PSYCH: mentation appears normal, affect normal/bright    ASSESSMENT/PLAN:   1. Encounter for routine adult health examination without abnormal findings  Biometric screening completed    2. Screening for diabetes mellitus  - Glucose    3. Encounter for screening for cardiovascular disorders  - Lipid panel reflex to direct LDL Fasting    4. History of thyroid disease  - TSH with free T4 reflex    5. Tinea pedis of left foot  Recommended OTC antifungal cream or spray    COUNSELING:  Reviewed preventive health counseling, as reflected in patient instructions       Regular exercise       Healthy diet/nutrition         reports that she has never smoked. She has never used smokeless tobacco.    Estimated body " "mass index is 26.48 kg/(m^2) as calculated from the following:    Height as of this encounter: 5' 6.97\" (1.701 m).    Weight as of this encounter: 168 lb 14.4 oz (76.6 kg).   Weight management plan: Discussed healthy diet and exercise guidelines and patient will follow up in 12 months in clinic to re-evaluate.    Counseling Resources:  ATP IV Guidelines  Pooled Cohorts Equation Calculator  Breast Cancer Risk Calculator  FRAX Risk Assessment  ICSI Preventive Guidelines  Dietary Guidelines for Americans, 2010  USDA's MyPlate  ASA Prophylaxis  Lung CA Screening    WARNER [facility]  "

## 2017-12-21 ENCOUNTER — OFFICE VISIT (OUTPATIENT)
Dept: FAMILY MEDICINE | Facility: OTHER | Age: 42
End: 2017-12-21
Payer: COMMERCIAL

## 2017-12-21 VITALS
SYSTOLIC BLOOD PRESSURE: 112 MMHG | WEIGHT: 168.9 LBS | TEMPERATURE: 98.2 F | BODY MASS INDEX: 26.51 KG/M2 | HEART RATE: 56 BPM | HEIGHT: 67 IN | DIASTOLIC BLOOD PRESSURE: 70 MMHG | RESPIRATION RATE: 16 BRPM

## 2017-12-21 DIAGNOSIS — B35.3 TINEA PEDIS OF LEFT FOOT: ICD-10-CM

## 2017-12-21 DIAGNOSIS — Z13.1 SCREENING FOR DIABETES MELLITUS: ICD-10-CM

## 2017-12-21 DIAGNOSIS — Z00.00 ENCOUNTER FOR ROUTINE ADULT HEALTH EXAMINATION WITHOUT ABNORMAL FINDINGS: Primary | ICD-10-CM

## 2017-12-21 DIAGNOSIS — Z86.39 HISTORY OF THYROID DISEASE: ICD-10-CM

## 2017-12-21 DIAGNOSIS — Z13.6 ENCOUNTER FOR SCREENING FOR CARDIOVASCULAR DISORDERS: ICD-10-CM

## 2017-12-21 LAB
CHOLEST SERPL-MCNC: 167 MG/DL
GLUCOSE SERPL-MCNC: 91 MG/DL (ref 70–99)
HDLC SERPL-MCNC: 63 MG/DL
LDLC SERPL CALC-MCNC: 78 MG/DL
NONHDLC SERPL-MCNC: 104 MG/DL
TRIGL SERPL-MCNC: 128 MG/DL
TSH SERPL DL<=0.005 MIU/L-ACNC: 0.4 MU/L (ref 0.4–4)

## 2017-12-21 PROCEDURE — 36415 COLL VENOUS BLD VENIPUNCTURE: CPT | Performed by: STUDENT IN AN ORGANIZED HEALTH CARE EDUCATION/TRAINING PROGRAM

## 2017-12-21 PROCEDURE — 84443 ASSAY THYROID STIM HORMONE: CPT | Performed by: STUDENT IN AN ORGANIZED HEALTH CARE EDUCATION/TRAINING PROGRAM

## 2017-12-21 PROCEDURE — 82947 ASSAY GLUCOSE BLOOD QUANT: CPT | Performed by: STUDENT IN AN ORGANIZED HEALTH CARE EDUCATION/TRAINING PROGRAM

## 2017-12-21 PROCEDURE — 99396 PREV VISIT EST AGE 40-64: CPT | Performed by: STUDENT IN AN ORGANIZED HEALTH CARE EDUCATION/TRAINING PROGRAM

## 2017-12-21 PROCEDURE — 80061 LIPID PANEL: CPT | Performed by: STUDENT IN AN ORGANIZED HEALTH CARE EDUCATION/TRAINING PROGRAM

## 2017-12-21 ASSESSMENT — PAIN SCALES - GENERAL: PAINLEVEL: NO PAIN (0)

## 2017-12-21 NOTE — MR AVS SNAPSHOT
After Visit Summary   12/21/2017    Nithya Tolentino    MRN: [id number]           Patient Information     Date Of Birth          1975        Visit Information        Provider Department      12/21/2017 10:00 AM Lilliana Mayes APRN [facility]        Today's Diagnoses     Encounter for routine adult health examination without abnormal findings    -  1    Screening for diabetes mellitus        Encounter for screening for cardiovascular disorders        History of thyroid disease        Tinea pedis of left foot          Care Instructions    Tinactin  Ketoconazole   Athlete's foot cream or spray        Preventive Health Recommendations  Female Ages 40 to 49    Yearly exam:     See your health care provider every year in order to  1. Review health changes.   2. Discuss preventive care.    3. Review your medicines if your doctor prescribed any.      Get a Pap test every three years (unless you have an abnormal result and your provider advises testing more often).      If you get Pap tests with HPV test, you only need to test every 5 years, unless you have an abnormal result. You do not need a Pap test if your uterus was removed (hysterectomy) and you have not had cancer.      You should be tested each year for STDs (sexually transmitted diseases), if you're at risk.       Ask your doctor if you should have a mammogram.      Have a colonoscopy (test for colon cancer) if someone in your family has had colon cancer or polyps before age 50.       Have a cholesterol test every 5 years.       Have a diabetes test (fasting glucose) after age 45. If you are at risk for diabetes, you should have this test every 3 years.    Shots: Get a flu shot each year. Get a tetanus shot every 10 years.     Nutrition:     Eat at least 5 servings of fruits and vegetables each day.    Eat whole-grain bread, whole-wheat pasta and brown rice instead of white grains and rice.    Talk to your provider  "about Calcium and Vitamin D.     Lifestyle    Exercise at least 150 minutes a week (an average of 30 minutes a day, 5 days a week). This will help you control your weight and prevent disease.    Limit alcohol to one drink per day.    No smoking.     Wear sunscreen to prevent skin cancer.    See your dentist every six months for an exam and cleaning.          Follow-ups after your visit        Who to contact     If you have questions or need follow up information about today's clinic visit or your schedule please contact [facility] directly at [phone].  Normal or non-critical lab and imaging results will be communicated to you by [employer], letter or phone within 4 business days after the clinic has received the results. If you do not hear from us within 7 days, please contact the clinic through [employer] or phone. If you have a critical or abnormal lab result, we will notify you by phone as soon as possible.  Submit refill requests through [employer] or call your pharmacy and they will forward the refill request to us. Please allow 3 business days for your refill to be completed.          Additional Information About Your Visit        [employer] Information     [employer] gives you secure access to your electronic health record. If you see a primary care provider, you can also send messages to your care team and make appointments. If you have questions, please call your primary care clinic.  If you do not have a primary care provider, please call [phone] and they will assist you.        Care EveryWhere ID     This is your Care EveryWhere ID. This could be used by other organizations to access your [place] medical records  [id number]        Your Vitals Were     Pulse Temperature Respirations Height Last Period BMI (Body Mass Index)    56 98.2  F (36.8  C) (Temporal) 16 5' 6.97\" (1.701 m) 11/23/2017 (Approximate) 26.48 kg/m2       Blood Pressure from Last 3 Encounters:   12/21/17 112/70   10/12/17 " 110/60   10/06/17 113/62    Weight from Last 3 Encounters:   12/21/17 168 lb 14.4 oz (76.6 kg)   10/12/17 173 lb 12.8 oz (78.8 kg)   09/28/17 170 lb 8 oz (77.3 kg)              We Performed the Following     Glucose     Lipid panel reflex to direct LDL Fasting     TSH with free T4 reflex        Primary Care Provider Fax #    Physician No Ref-Primary [phone]       No address on file        Equal Access to Services     HUONG BARTON : Hadii aad ku hadasho Soomaali, waaxda luqadaha, qaybta kaalmada adeegyada, waxay desiree camilanidia chaudhrykorinacassie guillermo . So [facility] [phone].    ATENCIÓN: Si habla español, tiene a snow disposición servicios gratuitos de asistencia lingüística. [facility] [phone].    We comply with applicable federal civil rights laws and [place] laws. We do not discriminate on the basis of race, color, national origin, age, disability, sex, sexual orientation, or gender identity.            Thank you!     Thank you for choosing [facility]  for your care. Our goal is always to provide you with excellent care. Hearing back from our patients is one way we can continue to improve our services. Please take a few minutes to complete the written survey that you may receive in the mail after your visit with us. Thank you!             Your Updated Medication List - Protect others around you: Learn how to safely use, store and throw away your medicines at www.disposemymeds.org.          This list is accurate as of: 12/21/17 11:06 AM.  Always use your most recent med list.                   Brand Name Dispense Instructions for use Diagnosis    MONSERRAT SELTZER PLUS PO           calcium-magnesium 500-250 MG Tabs per tablet    CALMAG     Take 1 tablet by mouth daily        cyanocobalamin 1000 MCG tablet    vitamin  B-12     Take 1,000 mcg by mouth daily        fexofenadine 180 MG tablet    ALLEGRA    30 tablet    Take 1 tablet (180 mg) by mouth daily    Other chronic sinusitis, Rhinosinusitis        IRON SUPPLEMENT PO      Take 65 mg by mouth daily Reported on 3/6/2017        mometasone 50 MCG/ACT spray    NASONEX    1 Box    Spray 2 sprays into both nostrils daily    Other chronic sinusitis, Rhinosinusitis       MUCINEX D PO      Take 600 mg by mouth        naproxen 500 MG tablet    NAPROSYN    60 tablet    Take 1 tablet (500 mg) by mouth 2 times daily as needed for moderate pain    Dysmenorrhea       norethindrone-ethinyl estradiol 1-20 MG-MCG per tablet    JUNEL FE 1/20    84 tablet    Take 1 tablet by mouth daily    Dysmenorrhea       SUDAFED PO      Take 120 mg by mouth        TYLENOL 500 MG tablet   Generic drug:  acetaminophen      Take 1-2 tablets by mouth every 6 hours as needed. 2 tablets before bedtime

## 2017-12-26 ENCOUNTER — TELEPHONE (OUTPATIENT)
Dept: FAMILY MEDICINE | Facility: OTHER | Age: 42
End: 2017-12-26

## 2017-12-26 NOTE — TELEPHONE ENCOUNTER
Patient brought in biometric screening form. Form filled out and faxed on 12/21/17. Spoke to patient today, patient requested form be sent back to her.

## 2018-01-22 ENCOUNTER — OFFICE VISIT (OUTPATIENT)
Dept: FAMILY MEDICINE | Facility: OTHER | Age: 43
End: 2018-01-22
Payer: COMMERCIAL

## 2018-01-22 VITALS
SYSTOLIC BLOOD PRESSURE: 116 MMHG | HEART RATE: 72 BPM | RESPIRATION RATE: 16 BRPM | TEMPERATURE: 97.9 F | DIASTOLIC BLOOD PRESSURE: 70 MMHG | BODY MASS INDEX: 26.21 KG/M2 | HEIGHT: 67 IN | WEIGHT: 167 LBS

## 2018-01-22 DIAGNOSIS — B96.89 BACTERIAL SINUSITIS: Primary | ICD-10-CM

## 2018-01-22 DIAGNOSIS — J32.9 BACTERIAL SINUSITIS: Primary | ICD-10-CM

## 2018-01-22 PROCEDURE — 99213 OFFICE O/P EST LOW 20 MIN: CPT | Performed by: NURSE PRACTITIONER

## 2018-01-22 RX ORDER — AZITHROMYCIN 250 MG/1
TABLET, FILM COATED ORAL
Qty: 6 TABLET | Refills: 0 | Status: SHIPPED | OUTPATIENT
Start: 2018-01-22 | End: 2019-01-31

## 2018-01-22 NOTE — NURSING NOTE
"No chief complaint on file.      Initial /70 (BP Location: Left arm, Patient Position: Chair, Cuff Size: Adult Regular)  Pulse 72  Temp 97.9  F (36.6  C) (Oral)  Resp 16  Ht 5' 7.13\" (1.705 m)  Wt 167 lb (75.8 kg)  BMI 26.06 kg/m2 Estimated body mass index is 26.06 kg/(m^2) as calculated from the following:    Height as of this encounter: 5' 7.13\" (1.705 m).    Weight as of this encounter: 167 lb (75.8 kg).  Medication Reconciliation: complete      "

## 2018-01-22 NOTE — PATIENT INSTRUCTIONS
Please take antibiotic with a probiotic or yogurt (3 small containers) daily not directly with the antibiotic for optimal good bacterial protection.     May use afrin nasal spray, do not use longer than 2 weeks.     Return to clinic if symptoms do not improve or worsen.     Thank you  Xuan Rae CNP

## 2018-01-22 NOTE — MR AVS SNAPSHOT
After Visit Summary   1/22/2018    Nithya Tolentino    MRN: [id number]           Patient Information     Date Of Birth          1975        Visit Information        Provider Department      1/22/2018 8:30 AM Xuan Rae APRN CNP [facility]        Today's Diagnoses     Bacterial sinusitis    -  1      Care Instructions    Please take antibiotic with a probiotic or yogurt (3 small containers) daily not directly with the antibiotic for optimal good bacterial protection.     May use afrin nasal spray, do not use longer than 2 weeks.     Return to clinic if symptoms do not improve or worsen.     Thank you  Xuan Rae CNP            Follow-ups after your visit        Who to contact     If you have questions or need follow up information about today's clinic visit or your schedule please contact [facility] directly at [phone].  Normal or non-critical lab and imaging results will be communicated to you by [employer], letter or phone within 4 business days after the clinic has received the results. If you do not hear from us within 7 days, please contact the clinic through [employer] or phone. If you have a critical or abnormal lab result, we will notify you by phone as soon as possible.  Submit refill requests through [employer] or call your pharmacy and they will forward the refill request to us. Please allow 3 business days for your refill to be completed.          Additional Information About Your Visit        MyChart Information     [employer] gives you secure access to your electronic health record. If you see a primary care provider, you can also send messages to your care team and make appointments. If you have questions, please call your primary care clinic.  If you do not have a primary care provider, please call [phone] and they will assist you.        Care EveryWhere ID     This is your Care EveryWhere ID. This could be used by other organizations  "to access your [place] medical records  [id number]        Your Vitals Were     Pulse Temperature Respirations Height BMI (Body Mass Index)       72 97.9  F (36.6  C) (Oral) 16 5' 7.13\" (1.705 m) 26.06 kg/m2        Blood Pressure from Last 3 Encounters:   01/22/18 116/70   12/21/17 112/70   10/12/17 110/60    Weight from Last 3 Encounters:   01/22/18 167 lb (75.8 kg)   12/21/17 168 lb 14.4 oz (76.6 kg)   10/12/17 173 lb 12.8 oz (78.8 kg)              Today, you had the following     No orders found for display         Today's Medication Changes          These changes are accurate as of: 1/22/18  8:48 AM.  If you have any questions, ask your nurse or doctor.               Start taking these medicines.        Dose/Directions    azithromycin 250 MG tablet   Commonly known as:  ZITHROMAX   Used for:  Bacterial sinusitis   Started by:  Xuan Rae APRN CNP        Two tablets first day, then one tablet daily for four days.   Quantity:  6 tablet   Refills:  0            Where to get your medicines      These medications were sent to [place] [address] [place] [facility] [place], [place] - [address] [address] [facility]  [address] [facility] [address]     Phone:  [phone]     azithromycin 250 MG tablet                Primary Care Provider Office Phone # Fax #    Lilliana WARNER Collazo [phone]       [address] [facility] [address]        Equal Access to Services     JESSICA BARTON AH: Hadkareem hortono Sodeepti, waaxda luqadaha, qaybta kaalmada dustin, curtis royal. So [facility] [phone].    ATENCIÓN: Si habla español, tiene a snow disposición servicios gratuitos de asistencia lingüística. Llame al [phone].    We comply with applicable federal civil rights laws and [place] laws. We do not discriminate on the basis of race, color, national origin, age, disability, sex, sexual orientation, or gender identity.            Thank you!     Thank you for choosing [facility]  for your care. Our goal is always to provide you with excellent care. Hearing back from our patients is one way we can continue to improve our services. Please take a few minutes to complete the written survey that you may receive in the mail after your visit with us. Thank you!             Your Updated Medication List - Protect others around you: Learn how to safely use, store and throw away your medicines at www.disposemymeds.org.          This list is accurate as of: 1/22/18  8:48 AM.  Always use your most recent med list.                   Brand Name Dispense Instructions for use Diagnosis    azithromycin 250 MG tablet    ZITHROMAX    6 tablet    Two tablets first day, then one tablet daily for four days.    Bacterial sinusitis       calcium-magnesium 500-250 MG Tabs per tablet    CALMAG     Take 1 tablet by mouth daily        cyanocobalamin 1000 MCG tablet    vitamin  B-12     Take 1,000 mcg by mouth daily        fexofenadine 180 MG tablet    ALLEGRA    30 tablet    Take 1 tablet (180 mg) by mouth daily    Other chronic sinusitis, Rhinosinusitis       mometasone 50 MCG/ACT spray    NASONEX    1 Box    Spray 2 sprays into both nostrils daily    Other chronic sinusitis, Rhinosinusitis       MUCINEX D PO      Take 600 mg by mouth        naproxen 500 MG tablet    NAPROSYN    60 tablet    Take 1 tablet (500 mg) by mouth 2 times daily as needed for moderate pain    Dysmenorrhea       norethindrone-ethinyl estradiol 1-20 MG-MCG per tablet    JUNEL FE 1/20    84 tablet    Take 1 tablet by mouth daily    Dysmenorrhea       SUDAFED PO      Take 120 mg by mouth        TYLENOL 500 MG tablet   Generic drug:  acetaminophen      Take 1-2 tablets by mouth every 6 hours as needed. 2 tablets before bedtime

## 2018-01-22 NOTE — PROGRESS NOTES
"  SUBJECTIVE:                                                    Nithya Tolentino is a 42 year old female who presents to clinic today for the following health issues:      HPI    Acute Illness   Acute illness concerns: sinus infection   Onset: 2 weeks    Fever: no    Chills/Sweats: YES- chills     Headache (location?): YES, very light sensitive     Sinus Pressure:YES    Conjunctivitis:  no    Ear Pain: YES- both ears     Rhinorrhea: YES    Congestion: YES    Sore Throat: no      Cough: YES-non-productive (occasionally production     Wheeze: no     Decreased Appetite: YES    Nausea: no     Vomiting: no     Diarrhea:  no     Dysuria/Freq.: no     Fatigue/Achiness: YES    Sick/Strep Exposure: YES-       Therapies Tried and outcome:  Allegra, mucinex 600mg, 12hr sudafed, nyquil         Problem list and histories reviewed & adjusted, as indicated.  Additional history: as documented    BP Readings from Last 3 Encounters:   01/22/18 116/70   12/21/17 112/70   10/12/17 110/60    Wt Readings from Last 3 Encounters:   01/22/18 167 lb (75.8 kg)   12/21/17 168 lb 14.4 oz (76.6 kg)   10/12/17 173 lb 12.8 oz (78.8 kg)                  Labs reviewed in EPIC      ROS:  Constitutional, HEENT, cardiovascular, pulmonary, gi and gu systems are negative, except as otherwise noted.      OBJECTIVE:   /70 (BP Location: Left arm, Patient Position: Chair, Cuff Size: Adult Regular)  Pulse 72  Temp 97.9  F (36.6  C) (Oral)  Resp 16  Ht 5' 7.13\" (1.705 m)  Wt 167 lb (75.8 kg)  BMI 26.06 kg/m2  Body mass index is 26.06 kg/(m^2).  GENERAL: alert, mild distress and fatigued  EYES: Eyes grossly normal to inspection, PERRL and conjunctivae and sclerae normal  HENT: normal cephalic/atraumatic, ear canals and TM's normal, nose and mouth without ulcers or lesions, nasal mucosa edematous , rhinorrhea yellow and green, oropharynx clear, oral mucous membranes moist and sinuses: maxillary, frontal tenderness on bilateral  NECK: bilateral " anterior cervical adenopathy, no asymmetry, masses, or scars and thyroid normal to palpation  RESP: lungs clear to auscultation - no rales, rhonchi or wheezes  CV: regular rate and rhythm, normal S1 S2, no S3 or S4, no murmur, click or rub, no peripheral edema and peripheral pulses strong  MS: no gross musculoskeletal defects noted, no edema    ASSESSMENT/PLAN:     1. Bacterial sinusitis  - Due to length of symptoms, will treat   - Discussed antibiotic use, duration, and side effects  - Recommend rest and fluids  - Can continue with nyquil/dayquil   - Hand out given     The patient indicates understanding of these issues and agrees with the plan.     Follow up: PRN      - azithromycin (ZITHROMAX) 250 MG tablet; Two tablets first day, then one tablet daily for four days.  Dispense: 6 tablet; Refill: 0    Patient Instructions   Please take antibiotic with a probiotic or yogurt (3 small containers) daily not directly with the antibiotic for optimal good bacterial protection.     May use afrin nasal spray, do not use longer than 2 weeks.     Return to clinic if symptoms do not improve or worsen.     Thank you  Xuan Rae, WARNER [facility]

## 2018-05-14 ENCOUNTER — VIRTUAL VISIT (OUTPATIENT)
Dept: FAMILY MEDICINE | Facility: CLINIC | Age: 43
End: 2018-05-14
Payer: COMMERCIAL

## 2018-05-14 DIAGNOSIS — R09.81 CONGESTION OF PARANASAL SINUS: Primary | ICD-10-CM

## 2018-05-14 PROCEDURE — 98966 PH1 ASSMT&MGMT NQHP 5-10: CPT | Performed by: NURSE PRACTITIONER

## 2018-05-14 RX ORDER — AZITHROMYCIN 250 MG/1
TABLET, FILM COATED ORAL
Qty: 6 TABLET | Refills: 0 | Status: SHIPPED | OUTPATIENT
Start: 2018-05-18 | End: 2019-01-31

## 2018-05-14 NOTE — PROGRESS NOTES
"Nithya Tolentino is a 42 year old female who is being evaluated via a telephone visit.      The patient has been notified of following:     \"This telephone visit will be conducted via a call between you and your physician/provider. We have found that certain health care needs can be provided without the need for a physical exam.  This service lets us provide the care you need with a short phone conversation.  If a prescription is necessary we can send it directly to your pharmacy.  If lab work is needed we can place an order for that and you can then stop by our lab to have the test done at a later time.    We will bill your insurance company for this service.  Please check with your medical insurance if this type of visit is covered. You may be responsible for the cost of this type of visit if insurance coverage is denied.  The typical cost is $30 (10min), $59 (11-20min) and $85 (21-30min).  Most often these visits are shorter than 10 minutes.    If during the course of the call the physician/provider feels a telephone visit is not appropriate, you will not be charged for this service.\"       Consent has been obtained for this service by care team member: yes.   See the scanned image in the medical record.    Nithya Tolentino complains of  Sinus Problem      I have reviewed and updated the patient's Past Medical History, Social History, Family History and Medication List.    ALLERGIES  Augmentin    Wendy Garcia MA    (MA signature)    Additional provider notes: bad cold started 3 days ago, sore throat and HA. Pt. States sypmtoms started with a cold.   Traditionally will develop sinus infection.   No fever.    Saw ENT in past- surgery was discussed at the visit.   Teeth pain-no pain  Facial pressure- yes in cheeks and around eyes  Skin changes- no  Swelling on facial.   Sudafed, mucinex, nasonex. OTC Nyquil at night. OTC meds helping well.   Antibiotic- Z-pack works the best.   Cough- yes- productive, voice is hoarse. "   No body aches.         Assessment/Plan:  Sinus congestion.       I have reviewed the note as documented above.  This accurately captures the substance of my conversation with the patient,  Viral and allergy causes at this point. Continued home cares, if fever or facial symptoms change/worse- recommend OV. Not recommending antibiotics until 1 week of symptoms. Delayed RX given.   Pt. Wanting relief prior to a presentation on 5/19- continue home cares, antibiotics will likely not change symptoms as viral/allergy cause.   Consider surgery going forward with recurrent symptoms as discussed with ENT.    Total time of call between patient and provider was 7 minutes

## 2018-05-14 NOTE — MR AVS SNAPSHOT
After Visit Summary   5/14/2018    Nithya Tolentino    MRN: [id number]           Patient Information     Date Of Birth          1975        Visit Information        Provider Department      5/14/2018 12:20 PM Margarita Perry APRN CNP [facility]        Today's Diagnoses     Congestion of paranasal sinus    -  1       Follow-ups after your visit        Your next 10 appointments already scheduled     May 14, 2018 12:20 PM CDT   Telephone Visit with WARNER Finley CNP   [facility] ([facility])    [address], [address]  [place] [id number] [phone]           Note: this is not an onsite visit; there is no need to come to the facility.              Who to contact     If you have questions or need follow up information about today's clinic visit or your schedule please contact [facility] directly at [phone].  Normal or non-critical lab and imaging results will be communicated to you by MyChart, letter or phone within 4 business days after the clinic has received the results. If you do not hear from us within 7 days, please contact the clinic through MyChart or phone. If you have a critical or abnormal lab result, we will notify you by phone as soon as possible.  Submit refill requests through [employer] or call your pharmacy and they will forward the refill request to us. Please allow 3 business days for your refill to be completed.          Additional Information About Your Visit        MyChart Information     [employer] gives you secure access to your electronic health record. If you see a primary care provider, you can also send messages to your care team and make appointments. If you have questions, please call your primary care clinic.  If you do not have a primary care provider, please call [phone] and they will assist you.        Care EveryWhere ID     This is your Care EveryWhere ID. This could be used by other  organizations to access your [place] medical records  [id number]         Blood Pressure from Last 3 Encounters:   01/22/18 116/70   12/21/17 112/70   10/12/17 110/60    Weight from Last 3 Encounters:   01/22/18 167 lb (75.8 kg)   12/21/17 168 lb 14.4 oz (76.6 kg)   10/12/17 173 lb 12.8 oz (78.8 kg)              Today, you had the following     No orders found for display         Today's Medication Changes          These changes are accurate as of 5/14/18  9:31 AM.  If you have any questions, ask your nurse or doctor.               These medicines have changed or have updated prescriptions.        Dose/Directions    * azithromycin 250 MG tablet   Commonly known as:  ZITHROMAX   This may have changed:  Another medication with the same name was added. Make sure you understand how and when to take each.   Used for:  Bacterial sinusitis   Changed by:  Margarita Perry APRN CNP        Two tablets first day, then one tablet daily for four days.   Quantity:  6 tablet   Refills:  0       * azithromycin 250 MG tablet   Commonly known as:  ZITHROMAX   This may have changed:  You were already taking a medication with the same name, and this prescription was added. Make sure you understand how and when to take each.   Used for:  Congestion of paranasal sinus   Changed by:  Margarita Perry APRN CNP        Start taking on:  5/18/2018   2 tablets daily on day one, then one tablet po daily until gone.   Quantity:  6 tablet   Refills:  0       * Notice:  This list has 2 medication(s) that are the same as other medications prescribed for you. Read the directions carefully, and ask your doctor or other care provider to review them with you.         Where to get your medicines      These medications were sent to [place] [address] [place] [facility] [place] - [address]  [address] [place] [address]     Phone:  [phone]     azithromycin 250 MG tablet                Primary Care Provider Office Phone # Fax #    Lilliana Crouch  Mayes, APRN [facility] [phone]       [address] [facility] [address]        Equal Access to Services     HUONG BARTON : Chelsea rey denisha Wilson, francisco martinez, jd kaleatha chan, curtis fregoso ifeomakaleigh gaines laLizabethsheri royal. So [facility] [phone].    ATENCIÓN: Si habla español, tiene a snow disposición servicios gratuitos de asistencia lingüística. Llame al [phone].    We comply with applicable federal civil rights laws and [place] laws. We do not discriminate on the basis of race, color, national origin, age, disability, sex, sexual orientation, or gender identity.            Thank you!     Thank you for choosing [facility]  for your care. Our goal is always to provide you with excellent care. Hearing back from our patients is one way we can continue to improve our services. Please take a few minutes to complete the written survey that you may receive in the mail after your visit with us. Thank you!             Your Updated Medication List - Protect others around you: Learn how to safely use, store and throw away your medicines at www.disposemymeds.org.          This list is accurate as of 5/14/18  9:31 AM.  Always use your most recent med list.                   Brand Name Dispense Instructions for use Diagnosis    * azithromycin 250 MG tablet    ZITHROMAX    6 tablet    Two tablets first day, then one tablet daily for four days.    Bacterial sinusitis       * azithromycin 250 MG tablet   Start taking on:  5/18/2018    ZITHROMAX    6 tablet    2 tablets daily on day one, then one tablet po daily until gone.    Congestion of paranasal sinus       calcium-magnesium 500-250 MG Tabs per tablet    CALMAG     Take 1 tablet by mouth daily        cyanocobalamin 1000 MCG tablet    vitamin  B-12     Take 1,000 mcg by mouth daily        fexofenadine 180 MG tablet    ALLEGRA    30 tablet    Take 1 tablet (180 mg) by mouth daily    Other chronic sinusitis, Rhinosinusitis        mometasone 50 MCG/ACT spray    NASONEX    1 Box    Spray 2 sprays into both nostrils daily    Other chronic sinusitis, Rhinosinusitis       MUCINEX D PO      Take 600 mg by mouth        naproxen 500 MG tablet    NAPROSYN    60 tablet    Take 1 tablet (500 mg) by mouth 2 times daily as needed for moderate pain    Dysmenorrhea       norethindrone-ethinyl estradiol 1-20 MG-MCG per tablet    JUNEL FE 1/20    84 tablet    Take 1 tablet by mouth daily    Dysmenorrhea       SUDAFED PO      Take 120 mg by mouth        TYLENOL 500 MG tablet   Generic drug:  acetaminophen      Take 1-2 tablets by mouth every 6 hours as needed. 2 tablets before bedtime        * Notice:  This list has 2 medication(s) that are the same as other medications prescribed for you. Read the directions carefully, and ask your doctor or other care provider to review them with you.

## 2018-06-04 DIAGNOSIS — J32.9 RHINOSINUSITIS: ICD-10-CM

## 2018-06-04 DIAGNOSIS — J32.8 OTHER CHRONIC SINUSITIS: ICD-10-CM

## 2018-06-05 RX ORDER — MOMETASONE FUROATE MONOHYDRATE 50 UG/1
2 SPRAY, METERED NASAL DAILY
Qty: 1 BOX | Refills: 9 | Status: SHIPPED | OUTPATIENT
Start: 2018-06-05 | End: 2019-03-06

## 2018-06-05 NOTE — TELEPHONE ENCOUNTER
"Requested Prescriptions   Pending Prescriptions Disp Refills     mometasone (NASONEX) 50 MCG/ACT spray 1 Box 9     Sig: Spray 2 sprays into both nostrils daily    Inhaled Steroids Protocol Passed    6/4/2018 11:54 AM       Passed - Patient is age 12 or older       Passed - Recent (12 mo) or future (30 days) visit within the authorizing provider's specialty    Patient had office visit in the last 12 months or has a visit in the next 30 days with authorizing provider or within the authorizing provider's specialty.  See \"Patient Info\" tab in inbasket, or \"Choose Columns\" in Meds & Orders section of the refill encounter.            mometasone (NASONEX) 50 MCG/ACT spray  Prescription approved per [facility] Refill Protocol.    Maria Ines Gandhi RN, BSN     "

## 2018-09-04 ENCOUNTER — MYC MEDICAL ADVICE (OUTPATIENT)
Dept: OBGYN | Facility: OTHER | Age: 43
End: 2018-09-04

## 2018-09-04 DIAGNOSIS — N94.6 DYSMENORRHEA: ICD-10-CM

## 2018-09-04 RX ORDER — NORETHINDRONE ACETATE AND ETHINYL ESTRADIOL 1MG-20(21)
1 KIT ORAL DAILY
Qty: 28 TABLET | Refills: 0 | Status: SHIPPED | OUTPATIENT
Start: 2018-09-04 | End: 2018-09-04

## 2018-09-04 RX ORDER — NORETHINDRONE ACETATE AND ETHINYL ESTRADIOL 1MG-20(21)
1 KIT ORAL DAILY
Qty: 84 TABLET | Refills: 0 | Status: SHIPPED | OUTPATIENT
Start: 2018-09-04 | End: 2018-12-13

## 2018-09-04 NOTE — TELEPHONE ENCOUNTER
norethindrone-ethinyl estradiol (JUNEL FE 1/20) 1-20 MG-MCG per tablet  Prescription approved per [facility] Refill Protocol. Due to timing, 1 month supply sent to local pharmacy and original refill to mailorder    Due for physical 12/21/2018. Patient informed.     Maria Ines Gandhi, RN, BSN

## 2018-09-04 NOTE — TELEPHONE ENCOUNTER
Received prescription from [employer] form filled out and faxed back to [phone].  Bernie Chino, CMA

## 2018-10-11 DIAGNOSIS — N94.6 DYSMENORRHEA: ICD-10-CM

## 2018-10-12 RX ORDER — NORETHINDRONE ACETATE AND ETHINYL ESTRADIOL 1MG-20(21)
1 KIT ORAL DAILY
Qty: 84 TABLET | Refills: 0 | OUTPATIENT
Start: 2018-10-12

## 2018-10-12 NOTE — TELEPHONE ENCOUNTER
Refill not appropriate.  Rx sent to the requesting pharmacy on 9/4/18 for a 3 month supply with an additional 0 refills.    Ene Manuel RN

## 2018-11-30 ENCOUNTER — MYC MEDICAL ADVICE (OUTPATIENT)
Dept: FAMILY MEDICINE | Facility: OTHER | Age: 43
End: 2018-11-30

## 2018-12-03 NOTE — TELEPHONE ENCOUNTER
[employer] message read 11/30/2018  2:28 PM by Nithya Tolentino. No response at this time.  Next 5 appointments (look out 90 days)     Dec 13, 2018 11:45 AM CST   [facility]    [address] [facility] [address] [phone]                Will close encounter at this time. Maria Ines Gandhi, RN, BSN

## 2018-12-10 ENCOUNTER — MYC MEDICAL ADVICE (OUTPATIENT)
Dept: OBGYN | Facility: OTHER | Age: 43
End: 2018-12-10

## 2018-12-10 DIAGNOSIS — Z13.220 SCREENING CHOLESTEROL LEVEL: Primary | ICD-10-CM

## 2018-12-10 DIAGNOSIS — Z13.1 SCREENING FOR DIABETES MELLITUS: ICD-10-CM

## 2018-12-10 DIAGNOSIS — Z13.29 SCREENING FOR THYROID DISORDER: ICD-10-CM

## 2018-12-10 NOTE — TELEPHONE ENCOUNTER
Noted WWE with WARNER Min, CNW on 11-29-16:  Needs labs for work for cholesterol and diabetes.  Will send paper work to be completed    Noted patient had WWE on 12-21-17 with WARNER Lara.   Noted biometric form was completed with Lipids, FBS & TSH with Free T4    Next 5 appointments (look out 90 days)    Dec 13, 2018 11:45 AM CST  [facility] Visit with WARNER Chase CNM  [facility] [address] [facility] [address]  [phone]        Rachel Hyatt RN, BAN

## 2018-12-13 ENCOUNTER — OFFICE VISIT (OUTPATIENT)
Dept: OBGYN | Facility: OTHER | Age: 43
End: 2018-12-13
Payer: COMMERCIAL

## 2018-12-13 VITALS
WEIGHT: 171.5 LBS | DIASTOLIC BLOOD PRESSURE: 78 MMHG | SYSTOLIC BLOOD PRESSURE: 112 MMHG | HEIGHT: 67 IN | BODY MASS INDEX: 26.92 KG/M2 | HEART RATE: 76 BPM

## 2018-12-13 DIAGNOSIS — Z13.220 SCREENING CHOLESTEROL LEVEL: ICD-10-CM

## 2018-12-13 DIAGNOSIS — Z13.1 SCREENING FOR DIABETES MELLITUS: ICD-10-CM

## 2018-12-13 DIAGNOSIS — Z13.29 SCREENING FOR THYROID DISORDER: ICD-10-CM

## 2018-12-13 DIAGNOSIS — Z12.31 ENCOUNTER FOR SCREENING MAMMOGRAM FOR BREAST CANCER: ICD-10-CM

## 2018-12-13 DIAGNOSIS — Z01.419 WELL WOMAN EXAM: Primary | ICD-10-CM

## 2018-12-13 DIAGNOSIS — N94.6 DYSMENORRHEA: ICD-10-CM

## 2018-12-13 LAB
CHOLEST SERPL-MCNC: 156 MG/DL
GLUCOSE SERPL-MCNC: 98 MG/DL (ref 70–99)
HDLC SERPL-MCNC: 61 MG/DL
LDLC SERPL CALC-MCNC: 75 MG/DL
NONHDLC SERPL-MCNC: 95 MG/DL
TRIGL SERPL-MCNC: 99 MG/DL
TSH SERPL DL<=0.005 MIU/L-ACNC: 0.66 MU/L (ref 0.4–4)

## 2018-12-13 PROCEDURE — 82947 ASSAY GLUCOSE BLOOD QUANT: CPT | Performed by: ADVANCED PRACTICE MIDWIFE

## 2018-12-13 PROCEDURE — 36415 COLL VENOUS BLD VENIPUNCTURE: CPT | Performed by: ADVANCED PRACTICE MIDWIFE

## 2018-12-13 PROCEDURE — 84443 ASSAY THYROID STIM HORMONE: CPT | Performed by: ADVANCED PRACTICE MIDWIFE

## 2018-12-13 PROCEDURE — 80061 LIPID PANEL: CPT | Performed by: ADVANCED PRACTICE MIDWIFE

## 2018-12-13 PROCEDURE — 99396 PREV VISIT EST AGE 40-64: CPT | Performed by: ADVANCED PRACTICE MIDWIFE

## 2018-12-13 RX ORDER — CHLORAL HYDRATE 500 MG
2 CAPSULE ORAL DAILY
COMMUNITY

## 2018-12-13 RX ORDER — NORETHINDRONE ACETATE AND ETHINYL ESTRADIOL 1MG-20(21)
1 KIT ORAL DAILY
Qty: 84 TABLET | Refills: 0 | Status: SHIPPED | OUTPATIENT
Start: 2018-12-13 | End: 2018-12-13

## 2018-12-13 RX ORDER — NORETHINDRONE ACETATE AND ETHINYL ESTRADIOL 1MG-20(21)
1 KIT ORAL DAILY
Qty: 84 TABLET | Refills: 3 | Status: SHIPPED | OUTPATIENT
Start: 2018-12-13 | End: 2019-03-08

## 2018-12-13 ASSESSMENT — MIFFLIN-ST. JEOR: SCORE: 1465.55

## 2018-12-13 NOTE — PROGRESS NOTES
SUBJECTIVE:   CC: Nithya Tolentino is an 43 year old woman who presents for preventive health visit.     Physical   Annual:     Getting at least 3 servings of Calcium per day:  Yes    Bi-annual eye exam:  Yes    Dental care twice a year:  NO    Sleep apnea or symptoms of sleep apnea:  None    Diet:  Regular (no restrictions)    Frequency of exercise:  1 day/week    Duration of exercise:  Less than 15 minutes    Taking medications regularly:  Yes    Additional concerns today:  Yes    PHQ-2 Total Score: 0          Heart issue-chest tightness and fluttering  Itchy armpits      Today's PHQ-2 Score:   PHQ-2 ( 1999 [employer]) 12/13/2018   Q1: Little interest or pleasure in doing things 0   Q2: Feeling down, depressed or hopeless 0   PHQ-2 Score 0   Q1: Little interest or pleasure in doing things Not at all   Q2: Feeling down, depressed or hopeless Not at all   PHQ-2 Score 0       Abuse: Current or Past(Physical, Sexual or Emotional)- No  Do you feel safe in your environment? Yes    Social History     Tobacco Use     Smoking status: Never Smoker     Smokeless tobacco: Never Used   Substance Use Topics     Alcohol use: Yes     Comment: occ/  once a month      Alcohol Use 12/13/2018   If you drink alcohol do you typically have greater than 3 drinks per day OR greater than 7 drinks per week? No   No flowsheet data found.    Reviewed orders with patient.  Reviewed health maintenance and updated orders accordingly - Yes  BP Readings from Last 3 Encounters:   12/13/18 112/78   01/22/18 116/70   12/21/17 112/70    Wt Readings from Last 3 Encounters:   12/13/18 77.8 kg (171 lb 8 oz)   01/22/18 75.8 kg (167 lb)   12/21/17 76.6 kg (168 lb 14.4 oz)                  Patient Active Problem List   Diagnosis     Seasonal allergic rhinitis     Dysmenorrhea     CARDIOVASCULAR SCREENING; LDL GOAL LESS THAN 160     Restless legs syndrome (RLS)     Family history of supraventricular tachycardia     Other chronic sinusitis     Rhinosinusitis      Premenopausal menorrhagia     Past Surgical History:   Procedure Laterality Date     NO HISTORY OF SURGERY         Social History     Tobacco Use     Smoking status: Never Smoker     Smokeless tobacco: Never Used   Substance Use Topics     Alcohol use: Yes     Comment: occ/  once a month      Family History   Problem Relation Age of Onset     Thyroid Disease Mother      Dementia Mother      Arthritis Maternal Grandmother         osteoarthritis      Cancer Maternal Grandfather         bone     Neurologic Disorder Paternal Grandmother         brain tumor     Gastrointestinal Disease Other         cousin on dads side has celiac     Thyroid Disease Maternal Aunt          Current Outpatient Medications   Medication Sig Dispense Refill     acetaminophen (TYLENOL) 500 MG tablet Take 1-2 tablets by mouth every 6 hours as needed. 2 tablets before bedtime       calcium-magnesium (CALMAG) 500-250 MG TABS per tablet Take 1 tablet by mouth daily       cyanocobalamin (VITAMIN  B-12) 1000 MCG tablet Take 1,000 mcg by mouth daily       fexofenadine (ALLEGRA) 180 MG tablet Take 1 tablet (180 mg) by mouth daily 30 tablet 1     fish oil-omega-3 fatty acids 1000 MG capsule Take 2 g by mouth 2 times daily       mometasone (NASONEX) 50 MCG/ACT spray Spray 2 sprays into both nostrils daily 1 Box 9     naproxen (NAPROSYN) 500 MG tablet Take 1 tablet (500 mg) by mouth 2 times daily as needed for moderate pain 60 tablet 3     norethindrone-ethinyl estradiol (JUNEL FE 1/20) 1-20 MG-MCG tablet Take 1 tablet by mouth daily 84 tablet 3     Pseudoephedrine HCl (SUDAFED PO) Take 120 mg by mouth       Pseudoephedrine-Guaifenesin (MUCINEX D PO) Take 600 mg by mouth       azithromycin (ZITHROMAX) 250 MG tablet 2 tablets daily on day one, then one tablet po daily until gone. (Patient not taking: Reported on 12/13/2018) 6 tablet 0     azithromycin (ZITHROMAX) 250 MG tablet Two tablets first day, then one tablet daily for four days. (Patient not taking:  Reported on 5/14/2018) 6 tablet 0       Mammogram Screening: Patient under age 50, mutual decision reflected in health maintenance.      Pertinent mammograms are reviewed under the imaging tab.  History of abnormal Pap smear: NO - age 30-65 PAP every 5 years with negative HPV co-testing recommended  PAP / HPV Latest Ref Rng & Units 11/29/2016 10/2/2013   PAP - NIL NIL   HPV 16 DNA NEG Negative -   HPV 18 DNA NEG Negative -   OTHER HR HPV NEG Negative -     Reviewed and updated as needed this visit by clinical staff  Tobacco  Allergies  Meds  Med Hx  Surg Hx  Fam Hx  Soc Hx        Reviewed and updated as needed this visit by Provider            Review of Systems  CONSTITUTIONAL: NEGATIVE for fever, chills, change in weight  INTEGUMENTARU/SKIN: NEGATIVE for worrisome rashes, moles or lesions.  Complains of itching with any and all antiperspirant and deodorants that she uses even home made.  No rashes.   EYES: NEGATIVE for vision changes or irritation  ENT: NEGATIVE for ear, mouth and throat problems  RESP: NEGATIVE for significant cough or SOB  BREAST: NEGATIVE for masses, tenderness or discharge  CV: NEGATIVE for chest pain,  or peripheral edema.  Has increasing problems with palpitations in the last three months. Was diagnosed with PACs  About 2 years ago and has had issues on and off.  Knows caffeine is an issue and so avoids it.   GI: NEGATIVE for nausea, abdominal pain, heartburn, or change in bowel habits  : NEGATIVE for unusual urinary or vaginal symptoms. Periods are regular and light.  She has some cramping that she is able to manage with OTC meds.  Discussed possibly doing extended cycle pills since her periods are light and cramping is bothersome.   MUSCULOSKELETAL: NEGATIVE for significant arthralgias or myalgia  NEURO: NEGATIVE for weakness, dizziness or paresthesias  ENDOCRINE: NEGATIVE for temperature intolerance, skin/hair changes  HEME/ALLERGY/IMMUNE: NEGATIVE for bleeding  "problems  PSYCHIATRIC: NEGATIVE for changes in mood or affect     OBJECTIVE:   /78 (BP Location: Right arm, Patient Position: Sitting, Cuff Size: Adult Large)   Pulse 76   Ht 1.702 m (5' 7\")   Wt 77.8 kg (171 lb 8 oz)   LMP 11/22/2018 (Approximate)   BMI 26.86 kg/m    Physical Exam  GENERAL: healthy, alert and no distress  EYES: Eyes grossly normal to inspection, PERRL and conjunctivae and sclerae normal  HENT: ear canals and TM's normal, nose and mouth without ulcers or lesions  NECK: no adenopathy, no asymmetry, masses, or scars and thyroid normal to palpation  RESP: lungs clear to auscultation - no rales, rhonchi or wheezes  BREAST: normal without masses, tenderness or nipple discharge and no palpable axillary masses or adenopathy  CV: regular rate and rhythm, normal S1 S2, no S3 or S4, no murmur, click or rub, no peripheral edema and peripheral pulses strong  ABDOMEN: soft, nontender, no hepatosplenomegaly, no masses and bowel sounds normal  MS: no gross musculoskeletal defects noted, no edema  SKIN: no suspicious lesions or rashes  NEURO: Normal strength and tone, mentation intact and speech normal  PSYCH: mentation appears normal, affect normal/bright    Diagnostic Test Results:  No results found for this or any previous visit (from the past 24 hour(s)).    ASSESSMENT/PLAN:   (Z01.419) Well woman exam  (primary encounter diagnosis)  Comment:   Plan:     (Z12.31) Encounter for screening mammogram for breast cancer  Comment:   Plan: *MA Screening Digital Bilateral            (N94.6) Dysmenorrhea  Comment:   Plan: norethindrone-ethinyl estradiol (JUNEL FE 1/20)        1-20 MG-MCG tablet, DISCONTINUED:         norethindrone-ethinyl estradiol (JUNEL FE 1/20)        1-20 MG-MCG tablet      Could consider extended cycle pills to limit or avoid cramping.         COUNSELING:  Reviewed preventive health counseling, as reflected in patient instructions       Healthy diet/nutrition       Contraception    BP " "Readings from Last 1 Encounters:   12/13/18 112/78     Estimated body mass index is 26.86 kg/m  as calculated from the following:    Height as of this encounter: 1.702 m (5' 7\").    Weight as of this encounter: 77.8 kg (171 lb 8 oz).      Recommended to follow up with FP regarding her cardiac irregularity.   The cardiologist had recommendations for meds and action plan in the results of the halter monitor.      reports that  has never smoked. she has never used smokeless tobacco.      Counseling Resources:  ATP IV Guidelines  Pooled Cohorts Equation Calculator  Breast Cancer Risk Calculator  FRAX Risk Assessment  ICSI Preventive Guidelines  Dietary Guidelines for Americans, 2010  USDA's MyPlate  ASA Prophylaxis  Lung CA Screening    [facility]  "

## 2018-12-13 NOTE — NURSING NOTE
"Chief Complaint   Patient presents with     Physical       Initial /78 (BP Location: Right arm, Patient Position: Sitting, Cuff Size: Adult Large)   Pulse 76   Ht 1.702 m (5' 7\")   Wt 77.8 kg (171 lb 8 oz)   LMP 11/22/2018 (Approximate)   BMI 26.86 kg/m   Estimated body mass index is 26.86 kg/m  as calculated from the following:    Height as of this encounter: 1.702 m (5' 7\").    Weight as of this encounter: 77.8 kg (171 lb 8 oz).  Medication Reconciliation: complete    Lana Lindsay CMA    "

## 2018-12-20 ENCOUNTER — MYC MEDICAL ADVICE (OUTPATIENT)
Dept: OBGYN | Facility: OTHER | Age: 43
End: 2018-12-20

## 2018-12-20 NOTE — TELEPHONE ENCOUNTER
Forms completed  
Pt had dropped off form to be completed by JK. Please contact when completed. Form is in [facility] mail box at the [facility]  
Spoke with patient and she will  completed form at [employer] . Copy sent to be scanned into pt. Chart.  Lana Lindsay CMA    
Na+ 139 (improved, on Nacl @100 ml/hr)

## 2018-12-28 ENCOUNTER — MYC MEDICAL ADVICE (OUTPATIENT)
Dept: OBGYN | Facility: OTHER | Age: 43
End: 2018-12-28

## 2019-01-30 NOTE — PROGRESS NOTES
SUBJECTIVE:   Nithya Tolentino is a 43 year old female who presents to clinic today for the following health issues:      HPI  Concern - Numbness/tingling in arms and hands   Onset: x6 months     Description:   Patient states that is happens 3-4 nights a week. Starts at the elbow and goes down to pinky and ring finger. Right index finger is swollen and painful.     Intensity: moderate, severe    Progression of Symptoms:  worsening    Precipitating factors:   Worsened by: None    Alleviating factors:  Improved by: None    Therapies Tried and outcome: None  Problem list and histories reviewed & adjusted, as indicated.  Additional history: as documented    Pt notes tingling in both hands on the ulnar side.  Tingling extends from her elbow to the tip of her fingers on both hands.  Much worse close to to the fingers.  Primarily happens at night.      She works at a computer all day, typing/running a mouse.  Works for [employer].  Has an ergonomic keyboard, has used for years.      Has pain in her right index finger, starting on Monday, affecting DIP joint and then spread to PIP joint.  Very tender on ulnar side of the joint.  Then had some soreness of her right middle finger.  Does have some stiffness and pain with flexion of fingers.  Doesn't recall and injury.        Current Outpatient Medications   Medication Sig Dispense Refill     acetaminophen (TYLENOL) 500 MG tablet Take 1-2 tablets by mouth every 6 hours as needed. 2 tablets before bedtime       calcium-magnesium (CALMAG) 500-250 MG TABS per tablet Take 1 tablet by mouth daily       cyanocobalamin (VITAMIN  B-12) 1000 MCG tablet Take 1,000 mcg by mouth daily       fexofenadine (ALLEGRA) 180 MG tablet Take 1 tablet (180 mg) by mouth daily 30 tablet 1     fish oil-omega-3 fatty acids 1000 MG capsule Take 2 g by mouth 2 times daily       mometasone (NASONEX) 50 MCG/ACT spray Spray 2 sprays into both nostrils daily 1 Box 9     naproxen (NAPROSYN) 500 MG tablet Take 1  "tablet (500 mg) by mouth 2 times daily as needed for moderate pain 60 tablet 3     norethindrone-ethinyl estradiol (JUNEL FE 1/20) 1-20 MG-MCG tablet Take 1 tablet by mouth daily 84 tablet 3     Pseudoephedrine HCl (SUDAFED PO) Take 120 mg by mouth       Pseudoephedrine-Guaifenesin (MUCINEX D PO) Take 600 mg by mouth       Recent Labs   Lab Test 12/13/18  0743 12/21/17  1043  11/29/16  0939 02/01/16  1006  06/24/15  1022   LDL 75 78  --  69  --   --  49   HDL 61 63  --  66  --   --  59   TRIG 99 128  --  87  --   --  60   ALT  --   --   --   --  23  --  16   CR  --   --   --  0.82 0.79  --  0.80   GFRESTIMATED  --   --   --  77 80  --  79   GFRESTBLACK  --   --   --  >90   GFR Calc   >90   GFR Calc    --  >90   GFR Calc     POTASSIUM  --   --   --  3.7 4.1  --  4.1   TSH 0.66 0.40   < > 0.37*  --    < > 0.31*    < > = values in this interval not displayed.      BP Readings from Last 3 Encounters:   01/31/19 110/74   12/13/18 112/78   01/22/18 116/70    Wt Readings from Last 3 Encounters:   01/31/19 76.7 kg (169 lb)   12/13/18 77.8 kg (171 lb 8 oz)   01/22/18 75.8 kg (167 lb)                  ROS:  Constitutional, HEENT, cardiovascular, pulmonary, gi and gu systems are negative, except as otherwise noted.  Some weakness of hands during the day.      OBJECTIVE:     /74   Pulse 64   Temp 97.4  F (36.3  C) (Temporal)   Resp 16   Ht 1.695 m (5' 6.73\")   Wt 76.7 kg (169 lb)   LMP 01/17/2019   BMI 26.68 kg/m    Body mass index is 26.68 kg/m .  GENERAL: healthy, alert and no distress  MS: tenderness of PIP and DIP joints of index finger of right hand.    NEURO: positive Tinel's over cubital tunnel bilaterally.  Negative Tinel's/Phalen's at wrist bilaterlly    Diagnostic Test Results:  Results for orders placed or performed in visit on 12/13/18   **TSH with free T4 reflex FUTURE anytime   Result Value Ref Range    TSH 0.66 0.40 - 4.00 mU/L   Lipid panel reflex to " direct LDL Fasting   Result Value Ref Range    Cholesterol 156 <200 mg/dL    Triglycerides 99 <150 mg/dL    HDL Cholesterol 61 >49 mg/dL    LDL Cholesterol Calculated 75 <100 mg/dL    Non HDL Cholesterol 95 <130 mg/dL   **Glucose FUTURE anytime   Result Value Ref Range    Glucose 98 70 - 99 mg/dL       ASSESSMENT/PLAN:       ICD-10-CM    1. Ulnar neuropathy of both upper extremities G56.23      Discussed nature of this, the fact that this does not appear to be carpal tunnel syndrome, and recommended treatments.  We will go ahead and try some conservative approaches for up to a month.  If not responding, will proceed to EMG or referral to specialty.  Follow-up as needed.    Portions of this note were completed using Dragon dictation software.  Although reviewed, there may be typographical and other inadvertent errors that remain.         Patient Instructions   Thank you for visiting [facility]    Wrap a towel around your arm at night to keep it straight while you're sleeping.    Get some pillows or padding for your elbows or rest your arm on a different portion of your arm while typing.      If not improving over the next month, we should re-evaluate or consider EMG testing to verity diagnosis.    Please Follow Up when indicated on the section below this one on your After-Visit Summary.      If you had imaging scheduled please refer to your radiology prep sheet.    Appointment    Date_______________     Time_____________    Day:   M TU W TH F    With____________________________    Location_________________________    If you need medication refills, please contact your pharmacy 3 days before your prescriptions runs out. If you are out of refills, your pharmacy will contact contact the clinic.    Contact us or return if questions or concerns.     -Your Care Team:  MD Nithya Canseco PA-C Joel De Haan, PA-C Elizabeth McLean, APRLEIDA Rae, WARNER, CLEVELAND Canela,  APRN, CNP     General information about your clinic      Clinic hours:     Lab hours:  Phone [phone]  Monday 7:30 am-7 pm    Monday 8:30 am-6:30 pm  Tuesday-Friday 7:30 am-5 pm   Tuesday-Friday 8:30 am-4:30 pm    Pharmacy hours:  Phone [phone]  Monday 8:30 am-7pm  Tuesday-Friday 8:30am-6 pm                                     [facility] assistance [phone]    We would like to hear from you, how was your visit today?    Devika Castillo  Patient Information Supervisor   Patient Care Supervisor  [employer], and [facility], and [facility]  [phone]          Alfredo Brewer MD, MD  [facility]

## 2019-01-31 ENCOUNTER — OFFICE VISIT (OUTPATIENT)
Dept: FAMILY MEDICINE | Facility: OTHER | Age: 44
End: 2019-01-31
Payer: COMMERCIAL

## 2019-01-31 VITALS
RESPIRATION RATE: 16 BRPM | HEART RATE: 64 BPM | DIASTOLIC BLOOD PRESSURE: 74 MMHG | TEMPERATURE: 97.4 F | SYSTOLIC BLOOD PRESSURE: 110 MMHG | WEIGHT: 169 LBS | HEIGHT: 67 IN | BODY MASS INDEX: 26.53 KG/M2

## 2019-01-31 DIAGNOSIS — G56.23 ULNAR NEUROPATHY OF BOTH UPPER EXTREMITIES: Primary | ICD-10-CM

## 2019-01-31 PROCEDURE — 99213 OFFICE O/P EST LOW 20 MIN: CPT | Performed by: FAMILY MEDICINE

## 2019-01-31 ASSESSMENT — PAIN SCALES - GENERAL: PAINLEVEL: MILD PAIN (3)

## 2019-01-31 ASSESSMENT — MIFFLIN-ST. JEOR: SCORE: 1449.95

## 2019-01-31 NOTE — PATIENT INSTRUCTIONS
Thank you for visiting [facility]    Wrap a towel around your arm at night to keep it straight while you're sleeping.    Get some pillows or padding for your elbows or rest your arm on a different portion of your arm while typing.      If not improving over the next month, we should re-evaluate or consider EMG testing to verity diagnosis.    Please Follow Up when indicated on the section below this one on your After-Visit Summary.      If you had imaging scheduled please refer to your radiology prep sheet.    Appointment    Date_______________     Time_____________    Day:   M TU W TH F    With____________________________    Location_________________________    If you need medication refills, please contact your pharmacy 3 days before your prescriptions runs out. If you are out of refills, your pharmacy will contact contact the clinic.    Contact us or return if questions or concerns.     -Your Care Team:  MD Nithya Canseco PA-C Joel De Haan, PA-C Elizabeth McLean, APRN CNP  WARNER Cutler, CNP  WARNER Burch, CNP     General information about your clinic      Clinic hours:     Lab hours:  Phone [phone]  Monday 7:30 am-7 pm    Monday 8:30 am-6:30 pm  Tuesday-Friday 7:30 am-5 pm   Tuesday-Friday 8:30 am-4:30 pm    Pharmacy hours:  Phone [phone]  Monday 8:30 am-7pm  Tuesday-Friday 8:30am-6 pm                                     [facility] assistance [phone]    We would like to hear from you, how was your visit today?    Devika Castillo  Patient Information Supervisor   Patient Care Supervisor  [facility], and [facility], and [facility] [address] [facility]  [phone]

## 2019-03-06 DIAGNOSIS — J32.8 OTHER CHRONIC SINUSITIS: ICD-10-CM

## 2019-03-06 DIAGNOSIS — J32.9 RHINOSINUSITIS: ICD-10-CM

## 2019-03-06 RX ORDER — MOMETASONE FUROATE MONOHYDRATE 50 UG/1
SPRAY, METERED NASAL
Qty: 51 G | Refills: 1 | Status: SHIPPED | OUTPATIENT
Start: 2019-03-06 | End: 2019-12-05

## 2019-03-08 DIAGNOSIS — N94.6 DYSMENORRHEA: ICD-10-CM

## 2019-03-08 RX ORDER — NORETHINDRONE ACETATE AND ETHINYL ESTRADIOL 1MG-20(21)
1 KIT ORAL DAILY
Qty: 84 TABLET | Refills: 2 | Status: SHIPPED | OUTPATIENT
Start: 2019-03-08 | End: 2019-12-05

## 2019-05-28 ENCOUNTER — OFFICE VISIT (OUTPATIENT)
Dept: FAMILY MEDICINE | Facility: OTHER | Age: 44
End: 2019-05-28
Payer: COMMERCIAL

## 2019-05-28 VITALS
BODY MASS INDEX: 27.91 KG/M2 | WEIGHT: 177.8 LBS | HEIGHT: 67 IN | DIASTOLIC BLOOD PRESSURE: 78 MMHG | HEART RATE: 72 BPM | OXYGEN SATURATION: 99 % | TEMPERATURE: 100.2 F | SYSTOLIC BLOOD PRESSURE: 108 MMHG | RESPIRATION RATE: 16 BRPM

## 2019-05-28 DIAGNOSIS — R21 ATYPICAL RASH: Primary | ICD-10-CM

## 2019-05-28 DIAGNOSIS — Z91.89 RISK OF EXPOSURE TO LYME DISEASE: ICD-10-CM

## 2019-05-28 PROCEDURE — 86618 LYME DISEASE ANTIBODY: CPT | Performed by: NURSE PRACTITIONER

## 2019-05-28 PROCEDURE — 99000 SPECIMEN HANDLING OFFICE-LAB: CPT | Performed by: NURSE PRACTITIONER

## 2019-05-28 PROCEDURE — 99213 OFFICE O/P EST LOW 20 MIN: CPT | Performed by: NURSE PRACTITIONER

## 2019-05-28 PROCEDURE — 86617 LYME DISEASE ANTIBODY: CPT | Mod: 90 | Performed by: NURSE PRACTITIONER

## 2019-05-28 PROCEDURE — 36415 COLL VENOUS BLD VENIPUNCTURE: CPT | Performed by: NURSE PRACTITIONER

## 2019-05-28 RX ORDER — TRIAMCINOLONE ACETONIDE 1 MG/G
CREAM TOPICAL 2 TIMES DAILY
Qty: 80 G | Refills: 0 | Status: SHIPPED | OUTPATIENT
Start: 2019-05-28 | End: 2019-12-05

## 2019-05-28 RX ORDER — DOXYCYCLINE HYCLATE 100 MG
100 TABLET ORAL 2 TIMES DAILY
Qty: 2 TABLET | Refills: 0 | Status: SHIPPED | OUTPATIENT
Start: 2019-05-28 | End: 2019-07-16

## 2019-05-28 ASSESSMENT — MIFFLIN-ST. JEOR: SCORE: 1493

## 2019-05-28 ASSESSMENT — PAIN SCALES - GENERAL: PAINLEVEL: MILD PAIN (2)

## 2019-05-28 NOTE — PROGRESS NOTES
Subjective     Nithya Tolentino is a 43 year old female who presents to clinic today for the following health issues:    HPI   Bite possible insect    Onset: Saturday    Description:   Location: right knee, outer  Character: round, blotchy, raised, painful, red, blanches to palpitation, dot in center, pt did not see insect, thinks it is a bite.   Itching (Pruritis): YES    Progression of Symptoms:  same    Accompanying Signs & Symptoms:  Fever: no  Body aches or joint pain: yes, did yard work and mowed lawn  Alleviating factors:  none    Therapies Tried and outcome: none  States she was in yard doing work and noticed red area on right outer aspect of leg this had increased in size and had a bullseye appearance from photo on phone. This is now gone. The area is 1 inch with vesicles and is dry and flat. Mild erythema throughout.     Patient Active Problem List   Diagnosis     Seasonal allergic rhinitis     Dysmenorrhea     CARDIOVASCULAR SCREENING; LDL GOAL LESS THAN 160     Restless legs syndrome (RLS)     Family history of supraventricular tachycardia     Other chronic sinusitis     Rhinosinusitis     Premenopausal menorrhagia     Past Surgical History:   Procedure Laterality Date     NO HISTORY OF SURGERY         Social History     Tobacco Use     Smoking status: Never Smoker     Smokeless tobacco: Never Used   Substance Use Topics     Alcohol use: Not Currently     Comment: occ/  once a month      Family History   Problem Relation Age of Onset     Thyroid Disease Mother      Dementia Mother      Arthritis Maternal Grandmother         osteoarthritis      Cancer Maternal Grandfather         bone     Neurologic Disorder Paternal Grandmother         brain tumor     Gastrointestinal Disease Other         cousin on dads side has celiac     Thyroid Disease Maternal Aunt          Current Outpatient Medications   Medication Sig Dispense Refill     acetaminophen (TYLENOL) 500 MG tablet Take 1-2 tablets by mouth every 6 hours  as needed. 2 tablets before bedtime       calcium-magnesium (CALMAG) 500-250 MG TABS per tablet Take 1 tablet by mouth daily       cyanocobalamin (VITAMIN  B-12) 1000 MCG tablet Take 1,000 mcg by mouth daily       doxycycline hyclate (VIBRA-TABS) 100 MG tablet Take 1 tablet (100 mg) by mouth 2 times daily 2 tablet 0     fexofenadine (ALLEGRA) 180 MG tablet Take 1 tablet (180 mg) by mouth daily 30 tablet 1     fish oil-omega-3 fatty acids 1000 MG capsule Take 2 g by mouth 2 times daily       mometasone (NASONEX) 50 MCG/ACT nasal spray USE 2 SPRAYS IN EACH NOSTRIL DAILY 51 g 1     naproxen (NAPROSYN) 500 MG tablet Take 1 tablet (500 mg) by mouth 2 times daily as needed for moderate pain 60 tablet 3     norethindrone-ethinyl estradiol (JUNEL FE 1/20) 1-20 MG-MCG tablet Take 1 tablet by mouth daily 84 tablet 2     Pseudoephedrine HCl (SUDAFED PO) Take 120 mg by mouth       Pseudoephedrine-Guaifenesin (MUCINEX D PO) Take 600 mg by mouth       triamcinolone (KENALOG) 0.1 % external cream Apply topically 2 times daily 80 g 0     Allergies   Allergen Reactions     Augmentin Nausea and Vomiting     Recent Labs   Lab Test 12/13/18  0743 12/21/17  1043  11/29/16  0939 02/01/16  1006  06/24/15  1022   LDL 75 78  --  69  --   --  49   HDL 61 63  --  66  --   --  59   TRIG 99 128  --  87  --   --  60   ALT  --   --   --   --  23  --  16   CR  --   --   --  0.82 0.79  --  0.80   GFRESTIMATED  --   --   --  77 80  --  79   GFRESTBLACK  --   --   --  >90   GFR Calc   >90   GFR Calc    --  >90   GFR Calc     POTASSIUM  --   --   --  3.7 4.1  --  4.1   TSH 0.66 0.40   < > 0.37*  --    < > 0.31*    < > = values in this interval not displayed.      BP Readings from Last 3 Encounters:   05/28/19 108/78   01/31/19 110/74 12/13/18 112/78    Wt Readings from Last 3 Encounters:   05/28/19 80.6 kg (177 lb 12.8 oz)   01/31/19 76.7 kg (169 lb)   12/13/18 77.8 kg (171 lb 8 oz)                     Reviewed and updated as needed this visit by Provider         Review of Systems   ROS COMP: Constitutional, HEENT, cardiovascular, pulmonary, GI, , musculoskeletal, neuro, skin, endocrine and psych systems are negative, except as otherwise noted.      Objective    There were no vitals taken for this visit.  There is no height or weight on file to calculate BMI.  Physical Exam   GENERAL: healthy, alert and no distress  EYES: Eyes grossly normal to inspection, PERRL and conjunctivae and sclerae normal  HENT: ear canals and TM's normal, nose and mouth without ulcers or lesions  NECK: no adenopathy, no asymmetry, masses, or scars and thyroid normal to palpation  RESP: lungs clear to auscultation - no rales, rhonchi or wheezes  CV: regular rate and rhythm, normal S1 S2, no S3 or S4, no murmur, click or rub, no peripheral edema and peripheral pulses strong  MS: no gross musculoskeletal defects noted, no edema  SKIN: as noted above.             Assessment & Plan     1. Atypical rash  Did not see tick but showed me photo with clear bullseye appearance will treat preventative and screen for lyme.   Rash is also indicative of poison ivy, oak or shingles in appearance.   - Lyme Disease Nerissa with reflex to WB Serum  - doxycycline hyclate (VIBRA-TABS) 100 MG tablet; Take 1 tablet (100 mg) by mouth 2 times daily  Dispense: 2 tablet; Refill: 0  - triamcinolone (KENALOG) 0.1 % external cream; Apply topically 2 times daily  Dispense: 80 g; Refill: 0    2. Risk of exposure to Lyme disease    - doxycycline hyclate (VIBRA-TABS) 100 MG tablet; Take 1 tablet (100 mg) by mouth 2 times daily  Dispense: 2 tablet; Refill: 0        Patient Instructions   I will call you with your lab result. Please take dose of doxycycline for preventative of Lymes.     Topical steroid as discussed    If symptoms are not improving in 3 - 5 days return to clinic     Thank you  Xuan Rae CNP        No follow-ups on file.      [facility]  [place]

## 2019-05-28 NOTE — PATIENT INSTRUCTIONS
I will call you with your lab result. Please take dose of doxycycline for preventative of Lymes.     Topical steroid as discussed    If symptoms are not improving in 3 - 5 days return to clinic     Thank you  Xuan Rae CNP

## 2019-05-30 LAB
B BURGDOR IGG SER QL IB: NEGATIVE
B BURGDOR IGG+IGM SER QL: 1.04 (ref 0–0.89)
B BURGDOR IGM SER QL IB: NEGATIVE

## 2019-05-31 ENCOUNTER — TELEPHONE (OUTPATIENT)
Dept: FAMILY MEDICINE | Facility: OTHER | Age: 44
End: 2019-05-31

## 2019-05-31 NOTE — TELEPHONE ENCOUNTER
Notes recorded by Vera Turcios CMA on 2019 at 12:59 PM CDT  Left message for patient to return call.     Vera Turcios MA     ------    Notes recorded by Xuan Rae APRN CNP on 2019 at 10:40 AM CDT  Please advise Nithya Tolentino,  1975, that her lab results were positive lyme at 1.06 normal is 0-0.89 this was sent for further testing (Western Blot) which was negative no further testing is needed and you should complete the antibiotic as discussed.   [phone] (home)   Thank you  Xuan Rae CNP

## 2019-05-31 NOTE — RESULT ENCOUNTER NOTE
Please advise Nithya Tolentino,  1975, that her lab results were positive lyme at 1.06 normal is 0-0.89 this was sent for further testing (Western Blot) which was negative no further testing is needed and you should complete the antibiotic as discussed.   [phone] (home)   Thank you  Xuan Rae CNP

## 2019-07-16 ENCOUNTER — OFFICE VISIT (OUTPATIENT)
Dept: FAMILY MEDICINE | Facility: OTHER | Age: 44
End: 2019-07-16
Payer: COMMERCIAL

## 2019-07-16 VITALS
OXYGEN SATURATION: 97 % | HEART RATE: 66 BPM | RESPIRATION RATE: 16 BRPM | SYSTOLIC BLOOD PRESSURE: 118 MMHG | WEIGHT: 174 LBS | TEMPERATURE: 98.6 F | DIASTOLIC BLOOD PRESSURE: 70 MMHG | BODY MASS INDEX: 27.31 KG/M2
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
## 2019-07-16 DIAGNOSIS — L03.116 CELLULITIS OF LEFT LOWER EXTREMITY: ICD-10-CM

## 2019-07-16 LAB
BASOPHILS # BLD AUTO: 0 10E9/L (ref 0–0.2)
BASOPHILS NFR BLD AUTO: 0.2 %
DIFFERENTIAL METHOD BLD: ABNORMAL
EOSINOPHIL # BLD AUTO: 0.1 10E9/L (ref 0–0.7)
EOSINOPHIL NFR BLD AUTO: 1.8 %
ERYTHROCYTE [DISTWIDTH] IN BLOOD BY AUTOMATED COUNT: 12.9 % (ref 10–15)
HCT VFR BLD AUTO: 45.2 % (ref 35–47)
HGB BLD-MCNC: 14.9 G/DL (ref 11.7–15.7)
LYMPHOCYTES # BLD AUTO: 2 10E9/L (ref 0.8–5.3)
LYMPHOCYTES NFR BLD AUTO: 30.5 %
MCH RBC QN AUTO: 31 PG (ref 26.5–33)
MCHC RBC AUTO-ENTMCNC: 33 G/DL (ref 31.5–36.5)
MCV RBC AUTO: 94 FL (ref 78–100)
MONOCYTES # BLD AUTO: 0.4 10E9/L (ref 0–1.3)
MONOCYTES NFR BLD AUTO: 6.8 %
NEUTROPHILS # BLD AUTO: 3.9 10E9/L (ref 1.6–8.3)
NEUTROPHILS NFR BLD AUTO: 60.7 %
PLATELET # BLD AUTO: 148 10E9/L (ref 150–450)
RBC # BLD AUTO: 4.8 10E12/L (ref 3.8–5.2)
WBC # BLD AUTO: 6.5 10E9/L (ref 4–11)

## 2019-07-16 PROCEDURE — 85025 COMPLETE CBC W/AUTO DIFF WBC: CPT | Performed by: NURSE PRACTITIONER

## 2019-07-16 PROCEDURE — 86617 LYME DISEASE ANTIBODY: CPT | Mod: 90 | Performed by: NURSE PRACTITIONER

## 2019-07-16 PROCEDURE — 99213 OFFICE O/P EST LOW 20 MIN: CPT | Performed by: NURSE PRACTITIONER

## 2019-07-16 PROCEDURE — 36415 COLL VENOUS BLD VENIPUNCTURE: CPT | Performed by: NURSE PRACTITIONER

## 2019-07-16 PROCEDURE — 99000 SPECIMEN HANDLING OFFICE-LAB: CPT | Performed by: NURSE PRACTITIONER

## 2019-07-16 PROCEDURE — 86618 LYME DISEASE ANTIBODY: CPT | Performed by: NURSE PRACTITIONER

## 2019-07-16 RX ORDER — CEFUROXIME AXETIL 500 MG/1
500 TABLET ORAL 2 TIMES DAILY
Qty: 28 TABLET | Refills: 0 | Status: SHIPPED | OUTPATIENT
Start: 2019-07-16 | End: 2019-12-05

## 2019-07-16 NOTE — PATIENT INSTRUCTIONS
Please monitor area closely I will call you with your lab results.   If symptoms of redness and pain increase with use of topical steroid will need to start antibiotic.     Thank you  Xuan Rae CNP

## 2019-07-16 NOTE — PROGRESS NOTES
Subjective     Nithya Tolentino is a 43 year old female who presents to clinic today for the following health issues:    HPI   Concern - Bug bite - inner thigh left leg   Onset: noticed Sunday night     Description:   Pt was at camp, not sure what bit her but there was a bite within a large red, hot area that is getting bigger daily. Painful and itchy. Pt has been monitoring the area with pictures.  Area is slightly swollen as well     Intensity: moderate    Progression of Symptoms:  worsening    Therapies Tried and outcome: steroid cream     Rash in appearance to lakisha (ER) rash view pictures the area is increased in erythema and size. States warm and tender to touch.   Recent exposure to tick bite 2 mos ago was treated with Doxy and had reaction to this states agitation.  Currently denies malaise, fever, chills.    Applied topical steroid last evening and states this brought down inflammation quite a bit.       Patient Active Problem List   Diagnosis     Seasonal allergic rhinitis     Dysmenorrhea     CARDIOVASCULAR SCREENING; LDL GOAL LESS THAN 160     Restless legs syndrome (RLS)     Family history of supraventricular tachycardia     Other chronic sinusitis     Rhinosinusitis     Premenopausal menorrhagia     Past Surgical History:   Procedure Laterality Date     NO HISTORY OF SURGERY         Social History     Tobacco Use     Smoking status: Never Smoker     Smokeless tobacco: Never Used   Substance Use Topics     Alcohol use: Not Currently     Comment: occ/  once a month      Family History   Problem Relation Age of Onset     Thyroid Disease Mother      Dementia Mother      Arthritis Maternal Grandmother         osteoarthritis      Cancer Maternal Grandfather         bone     Neurologic Disorder Paternal Grandmother         brain tumor     Gastrointestinal Disease Other         cousin on dads side has celiac     Thyroid Disease Maternal Aunt          Current Outpatient Medications   Medication Sig Dispense  Refill     acetaminophen (TYLENOL) 500 MG tablet Take 1-2 tablets by mouth every 6 hours as needed. 2 tablets before bedtime       calcium-magnesium (CALMAG) 500-250 MG TABS per tablet Take 1 tablet by mouth daily       cefuroxime (CEFTIN) 500 MG tablet Take 1 tablet (500 mg) by mouth 2 times daily for 14 days 28 tablet 0     cyanocobalamin (VITAMIN  B-12) 1000 MCG tablet Take 1,000 mcg by mouth daily       doxycycline hyclate (VIBRA-TABS) 100 MG tablet Take 1 tablet (100 mg) by mouth 2 times daily 2 tablet 0     fexofenadine (ALLEGRA) 180 MG tablet Take 1 tablet (180 mg) by mouth daily 30 tablet 1     fish oil-omega-3 fatty acids 1000 MG capsule Take 2 g by mouth 2 times daily       mometasone (NASONEX) 50 MCG/ACT nasal spray USE 2 SPRAYS IN EACH NOSTRIL DAILY 51 g 1     naproxen (NAPROSYN) 500 MG tablet Take 1 tablet (500 mg) by mouth 2 times daily as needed for moderate pain 60 tablet 3     norethindrone-ethinyl estradiol (JUNEL FE 1/20) 1-20 MG-MCG tablet Take 1 tablet by mouth daily 84 tablet 2     Pseudoephedrine HCl (SUDAFED PO) Take 120 mg by mouth       Pseudoephedrine-Guaifenesin (MUCINEX D PO) Take 600 mg by mouth       triamcinolone (KENALOG) 0.1 % external cream Apply topically 2 times daily 80 g 0     Allergies   Allergen Reactions     Augmentin Nausea and Vomiting     Recent Labs   Lab Test 12/13/18  0743 12/21/17  1043  11/29/16  0939 02/01/16  1006  06/24/15  1022   LDL 75 78  --  69  --   --  49   HDL 61 63  --  66  --   --  59   TRIG 99 128  --  87  --   --  60   ALT  --   --   --   --  23  --  16   CR  --   --   --  0.82 0.79  --  0.80   GFRESTIMATED  --   --   --  77 80  --  79   GFRESTBLACK  --   --   --  >90   GFR Calc   >90   GFR Calc    --  >90   GFR Calc     POTASSIUM  --   --   --  3.7 4.1  --  4.1   TSH 0.66 0.40   < > 0.37*  --    < > 0.31*    < > = values in this interval not displayed.      BP Readings from Last 3 Encounters:   07/16/19  "118/70   05/28/19 108/78   01/31/19 110/74    Wt Readings from Last 3 Encounters:   07/16/19 78.9 kg (174 lb)   05/28/19 80.6 kg (177 lb 12.8 oz)   01/31/19 76.7 kg (169 lb)                    Reviewed and updated as needed this visit by Provider         Review of Systems   ROS COMP: Constitutional, HEENT, cardiovascular, pulmonary, GI, , musculoskeletal, neuro, skin, endocrine and psych systems are negative, except as otherwise noted.      Objective    There were no vitals taken for this visit.  There is no height or weight on file to calculate BMI.  Physical Exam   GENERAL: healthy, alert and no distress  NECK: no adenopathy, no asymmetry, masses, or scars and thyroid normal to palpation  RESP: lungs clear to auscultation - no rales, rhonchi or wheezes  CV: regular rate and rhythm, normal S1 S2, no S3 or S4, no murmur, click or rub, no peripheral edema and peripheral pulses strong  MS: no gross musculoskeletal defects noted, no edema  SKIN:  Left medial upper thigh erythema, large area, warm and tender see media for photo.          Assessment & Plan     1. Bug bite, initial encounter  Unclear bug will screen for lyme. Check CBC.   - Lyme Disease Nerissa with reflex to WB Serum  - CBC with platelets and differential  - cefuroxime (CEFTIN) 500 MG tablet; Take 1 tablet (500 mg) by mouth 2 times daily for 14 days  Dispense: 28 tablet; Refill: 0    2. Cellulitis of left lower extremity  States improved somewhat with topical steroid will monitor this area closely if symptoms worsen will start antibiotic.   - CBC with platelets and differential  - cefuroxime (CEFTIN) 500 MG tablet; Take 1 tablet (500 mg) by mouth 2 times daily for 14 days  Dispense: 28 tablet; Refill: 0     BMI:   Estimated body mass index is 27.31 kg/m  as calculated from the following:    Height as of 5/28/19: 1.7 m (5' 6.93\").    Weight as of this encounter: 78.9 kg (174 lb).   Weight management plan: Patient was referred to their PCP to discuss a diet " and exercise plan.        Patient Instructions   Please monitor area closely I will call you with your lab results.   If symptoms of redness and pain increase with use of topical steroid will need to start antibiotic.     Thank you  Xuan Rae CNP          [facility]

## 2019-07-19 LAB
B BURGDOR IGG SER QL IB: NEGATIVE
B BURGDOR IGG+IGM SER QL: 1.11 (ref 0–0.89)
B BURGDOR IGM SER QL IB: NEGATIVE

## 2019-09-30 NOTE — PROGRESS NOTES
Subjective     Nithya Tolentino is a 43 year old female who presents to clinic today for the following health issues:    HPI   Concern - left great toe pain  Onset: july    Description:   Sharp stabbing pain sometimes radiates up left leg    Intensity: 1 week ago 7/10 now 1/10    Progression of Symptoms:  improving but  Constant now tingling sensation    Previous history of similar problem:   Joint pain in right index finger     Precipitating factors:   Worsened by: some shoes  Therapies Tried and outcome: naproxen and tylenol    Patient presents today for pain in the left foot. She reports symptoms started sometime in July - cannot really identify any acute triggers or injuries. Patient reports pain has been in the left MTP joint. She reports sometimes has felt it radiate under her foot. Typically worse with activity. Maybe mild swelling. No redness or warmth. Has changed shoes to better fit this area without improvement. Has a desk based job. Has never had anything like this before. She reports last Thursday night it woke her from sleeping. It was severe pain. She took Naproxen and then Tylenol. This did seem to dull the pain. Was not any more active the previous day.     Patient Active Problem List   Diagnosis     Seasonal allergic rhinitis     Dysmenorrhea     CARDIOVASCULAR SCREENING; LDL GOAL LESS THAN 160     Restless legs syndrome (RLS)     Family history of supraventricular tachycardia     Other chronic sinusitis     Rhinosinusitis     Premenopausal menorrhagia     Past Surgical History:   Procedure Laterality Date     NO HISTORY OF SURGERY         Social History     Tobacco Use     Smoking status: Never Smoker     Smokeless tobacco: Never Used   Substance Use Topics     Alcohol use: Not Currently     Comment: occ/  once a month      Family History   Problem Relation Age of Onset     Thyroid Disease Mother      Dementia Mother      Arthritis Maternal Grandmother         osteoarthritis      Cancer Maternal  "Grandfather         bone     Neurologic Disorder Paternal Grandmother         brain tumor     Gastrointestinal Disease Other         cousin on dads side has celiac     Thyroid Disease Maternal Aunt          Current Outpatient Medications   Medication Sig Dispense Refill     acetaminophen (TYLENOL) 500 MG tablet Take 1-2 tablets by mouth every 6 hours as needed. 2 tablets before bedtime       calcium-magnesium (CALMAG) 500-250 MG TABS per tablet Take 1 tablet by mouth daily       cyanocobalamin (VITAMIN  B-12) 1000 MCG tablet Take 1,000 mcg by mouth daily       fexofenadine (ALLEGRA) 180 MG tablet Take 1 tablet (180 mg) by mouth daily 30 tablet 1     fish oil-omega-3 fatty acids 1000 MG capsule Take 2 g by mouth 2 times daily       mometasone (NASONEX) 50 MCG/ACT nasal spray USE 2 SPRAYS IN EACH NOSTRIL DAILY 51 g 1     naproxen (NAPROSYN) 500 MG tablet Take 1 tablet (500 mg) by mouth 2 times daily as needed for moderate pain 60 tablet 3     norethindrone-ethinyl estradiol (JUNEL FE 1/20) 1-20 MG-MCG tablet Take 1 tablet by mouth daily 84 tablet 2     Pseudoephedrine HCl (SUDAFED PO) Take 120 mg by mouth       Pseudoephedrine-Guaifenesin (MUCINEX D PO) Take 600 mg by mouth       triamcinolone (KENALOG) 0.1 % external cream Apply topically 2 times daily 80 g 0     Allergies   Allergen Reactions     Augmentin Nausea and Vomiting     BP Readings from Last 3 Encounters:   10/03/19 124/80   07/16/19 118/70   05/28/19 108/78    Wt Readings from Last 3 Encounters:   10/03/19 79.8 kg (176 lb)   07/16/19 78.9 kg (174 lb)   05/28/19 80.6 kg (177 lb 12.8 oz)           Reviewed and updated as needed this visit by Provider  Allergies  Meds  Problems  Med Hx  Surg Hx         Review of Systems   ROS COMP: Constitutional, HEENT, cardiovascular, pulmonary, gi and gu systems are negative, except as otherwise noted.      Objective    /80   Pulse 67   Temp 97.5  F (36.4  C) (Temporal)   Resp 16   Ht 1.702 m (5' 7\")   Wt " "79.8 kg (176 lb)   LMP 09/26/2019 (Exact Date)   SpO2 97%   BMI 27.57 kg/m    Body mass index is 27.57 kg/m .  Physical Exam   GENERAL: healthy, alert and no distress  MS: No obvious deformity of the left foot. Mild swelling over the left MTP joint. No erythema or warmth appreciated. Tenderness over the left MTP. Full ROM. Distal pulses intact.   SKIN: no suspicious lesions or rashes  PSYCH: mentation appears normal, affect normal/bright    Diagnostic Test Results:  Labs reviewed in Epic  Results for orders placed or performed in visit on 10/03/19 (from the past 24 hour(s))   CBC with platelets   Result Value Ref Range    WBC 6.1 4.0 - 11.0 10e9/L    RBC Count 5.07 3.8 - 5.2 10e12/L    Hemoglobin 15.6 11.7 - 15.7 g/dL    Hematocrit 47.3 (H) 35.0 - 47.0 %    MCV 93 78 - 100 fl    MCH 30.8 26.5 - 33.0 pg    MCHC 33.0 31.5 - 36.5 g/dL    RDW 12.8 10.0 - 15.0 %    Platelet Count 270 150 - 450 10e9/L   ESR: Erythrocyte sedimentation rate   Result Value Ref Range    Sed Rate 9 0 - 20 mm/h           Assessment & Plan     1. Swelling of first metatarsophalangeal (MTP) joint of left foot  Question possible gout. Will obtain labs as below. Encouraged application of ice and well fitting shoes. May need to follow-up with podiatry if labs are normal.   - CBC with platelets  - Uric acid  - ESR: Erythrocyte sedimentation rate    2. Need for prophylactic vaccination and inoculation against influenza  - INFLUENZA VACCINE IM > 6 MONTHS VALENT IIV4 [26804]  - Vaccine Administration, Initial [57611]     BMI:   Estimated body mass index is 27.57 kg/m  as calculated from the following:    Height as of this encounter: 1.702 m (5' 7\").    Weight as of this encounter: 79.8 kg (176 lb).     The patient indicates understanding of these issues and agrees with the plan.    Cierra Burdick PA-C  [facility]    "

## 2019-10-03 ENCOUNTER — OFFICE VISIT (OUTPATIENT)
Dept: FAMILY MEDICINE | Facility: OTHER | Age: 44
End: 2019-10-03
Payer: COMMERCIAL

## 2019-10-03 VITALS
BODY MASS INDEX: 27.62 KG/M2 | RESPIRATION RATE: 16 BRPM | HEIGHT: 67 IN | WEIGHT: 176 LBS | TEMPERATURE: 97.5 F | HEART RATE: 67 BPM | DIASTOLIC BLOOD PRESSURE: 80 MMHG | SYSTOLIC BLOOD PRESSURE: 124 MMHG | OXYGEN SATURATION: 97 %

## 2019-10-03 DIAGNOSIS — Z23 NEED FOR PROPHYLACTIC VACCINATION AND INOCULATION AGAINST INFLUENZA: ICD-10-CM

## 2019-10-03 DIAGNOSIS — M25.475 SWELLING OF FIRST METATARSOPHALANGEAL (MTP) JOINT OF LEFT FOOT: Primary | ICD-10-CM

## 2019-10-03 LAB
ERYTHROCYTE [DISTWIDTH] IN BLOOD BY AUTOMATED COUNT: 12.8 % (ref 10–15)
ERYTHROCYTE [SEDIMENTATION RATE] IN BLOOD BY WESTERGREN METHOD: 9 MM/H (ref 0–20)
HCT VFR BLD AUTO: 47.3 % (ref 35–47)
HGB BLD-MCNC: 15.6 G/DL (ref 11.7–15.7)
MCH RBC QN AUTO: 30.8 PG (ref 26.5–33)
MCHC RBC AUTO-ENTMCNC: 33 G/DL (ref 31.5–36.5)
MCV RBC AUTO: 93 FL (ref 78–100)
PLATELET # BLD AUTO: 270 10E9/L (ref 150–450)
RBC # BLD AUTO: 5.07 10E12/L (ref 3.8–5.2)
URATE SERPL-MCNC: 4 MG/DL (ref 2.6–6)
WBC # BLD AUTO: 6.1 10E9/L (ref 4–11)

## 2019-10-03 PROCEDURE — 36415 COLL VENOUS BLD VENIPUNCTURE: CPT | Performed by: PHYSICIAN ASSISTANT

## 2019-10-03 PROCEDURE — 85652 RBC SED RATE AUTOMATED: CPT | Performed by: PHYSICIAN ASSISTANT

## 2019-10-03 PROCEDURE — 99213 OFFICE O/P EST LOW 20 MIN: CPT | Mod: 25 | Performed by: PHYSICIAN ASSISTANT

## 2019-10-03 PROCEDURE — 85027 COMPLETE CBC AUTOMATED: CPT | Performed by: PHYSICIAN ASSISTANT

## 2019-10-03 PROCEDURE — 84550 ASSAY OF BLOOD/URIC ACID: CPT | Performed by: PHYSICIAN ASSISTANT

## 2019-10-03 PROCEDURE — 90471 IMMUNIZATION ADMIN: CPT | Performed by: PHYSICIAN ASSISTANT

## 2019-10-03 PROCEDURE — 90686 IIV4 VACC NO PRSV 0.5 ML IM: CPT | Performed by: PHYSICIAN ASSISTANT

## 2019-10-03 ASSESSMENT — MIFFLIN-ST. JEOR: SCORE: 1485.96

## 2019-10-17 ENCOUNTER — OFFICE VISIT (OUTPATIENT)
Dept: PODIATRY | Facility: CLINIC | Age: 44
End: 2019-10-17
Payer: COMMERCIAL

## 2019-10-17 ENCOUNTER — ANCILLARY PROCEDURE (OUTPATIENT)
Dept: GENERAL RADIOLOGY | Facility: CLINIC | Age: 44
End: 2019-10-17
Attending: PODIATRIST
Payer: COMMERCIAL

## 2019-10-17 VITALS
SYSTOLIC BLOOD PRESSURE: 122 MMHG | DIASTOLIC BLOOD PRESSURE: 82 MMHG | WEIGHT: 176 LBS | BODY MASS INDEX: 27.62 KG/M2 | HEIGHT: 67 IN

## 2019-10-17 DIAGNOSIS — M20.5X2 ACQUIRED HALLUX LIMITUS OF LEFT FOOT: ICD-10-CM

## 2019-10-17 DIAGNOSIS — M20.5X2 ACQUIRED HALLUX LIMITUS OF LEFT FOOT: Primary | ICD-10-CM

## 2019-10-17 PROCEDURE — 99203 OFFICE O/P NEW LOW 30 MIN: CPT | Performed by: PODIATRIST

## 2019-10-17 PROCEDURE — 73630 X-RAY EXAM OF FOOT: CPT | Mod: TC

## 2019-10-17 ASSESSMENT — PAIN SCALES - GENERAL: PAINLEVEL: NO PAIN (1)

## 2019-10-17 ASSESSMENT — MIFFLIN-ST. JEOR: SCORE: 1485.96

## 2019-10-17 NOTE — LETTER
10/17/2019         RE: Nithya Tolentino  [address] [facility] [address]        Dear Colleague,    Thank you for referring your patient, Nithya Tolentino, to the [facility]. Please see a copy of my visit note below.    HPI:  Nithya Tolentino is a 43 year old female who is seen in consultation at the request of Cierra Burdick PA-C.    Pt presents for eval of:   (Onset, Location, L/R, Character, Treatments, Injury if yes)    XR Left foot today 10/17/2019     Onset July 2019, dorsal, medial Left great toe pain. No injury noted. Presents today with unsupportive slip on casual shoes. Patient states that she wears walking shoes and supportive sandals when she is more active.  Constant, dull ache, Intermittent, swelling, sharp, stabbing, throbbing, pain 1-7  Tylenol, naproxen    Works as a Mental Health Therapist.    Weight management plan: Patient was referred to their PCP to discuss a diet and exercise plan.     Patient to follow up with Primary Care provider regarding elevated blood pressure.    ROS:  10 point ROS neg other than the symptoms noted above in the HPI.    Patient Active Problem List   Diagnosis     Seasonal allergic rhinitis     Dysmenorrhea     CARDIOVASCULAR SCREENING; LDL GOAL LESS THAN 160     Restless legs syndrome (RLS)     Family history of supraventricular tachycardia     Other chronic sinusitis     Rhinosinusitis     Premenopausal menorrhagia       PAST MEDICAL HISTORY:   Past Medical History:   Diagnosis Date     Dysmenorrhea      Environmental allergies         PAST SURGICAL HISTORY:   Past Surgical History:   Procedure Laterality Date     NO HISTORY OF SURGERY          MEDICATIONS:   Current Outpatient Medications:      acetaminophen (TYLENOL) 500 MG tablet, Take 1-2 tablets by mouth every 6 hours as needed. 2 tablets before bedtime, Disp: , Rfl:      calcium-magnesium (CALMAG) 500-250 MG TABS per tablet, Take 1 tablet by mouth daily, Disp: , Rfl:      cyanocobalamin  (VITAMIN  B-12) 1000 MCG tablet, Take 1,000 mcg by mouth daily, Disp: , Rfl:      fexofenadine (ALLEGRA) 180 MG tablet, Take 1 tablet (180 mg) by mouth daily, Disp: 30 tablet, Rfl: 1     fish oil-omega-3 fatty acids 1000 MG capsule, Take 2 g by mouth 2 times daily, Disp: , Rfl:      mometasone (NASONEX) 50 MCG/ACT nasal spray, USE 2 SPRAYS IN EACH NOSTRIL DAILY, Disp: 51 g, Rfl: 1     naproxen (NAPROSYN) 500 MG tablet, Take 1 tablet (500 mg) by mouth 2 times daily as needed for moderate pain, Disp: 60 tablet, Rfl: 3     norethindrone-ethinyl estradiol (JUNEL FE 1/20) 1-20 MG-MCG tablet, Take 1 tablet by mouth daily, Disp: 84 tablet, Rfl: 2     Pseudoephedrine HCl (SUDAFED PO), Take 120 mg by mouth, Disp: , Rfl:      Pseudoephedrine-Guaifenesin (MUCINEX D PO), Take 600 mg by mouth, Disp: , Rfl:      triamcinolone (KENALOG) 0.1 % external cream, Apply topically 2 times daily, Disp: 80 g, Rfl: 0     ALLERGIES:    Allergies   Allergen Reactions     Augmentin Nausea and Vomiting        SOCIAL HISTORY:   Social History     Socioeconomic History     Marital status:      Spouse name: Not on file     Number of children: Not on file     Years of education: Not on file     Highest education level: Not on file   Occupational History     Not on file   Social Needs     Financial resource strain: Not on file     Food insecurity:     Worry: Not on file     Inability: Not on file     Transportation needs:     Medical: Not on file     Non-medical: Not on file   Tobacco Use     Smoking status: Never Smoker     Smokeless tobacco: Never Used   Substance and Sexual Activity     Alcohol use: Not Currently     Comment: occ/  once a month      Drug use: No     Sexual activity: Yes     Partners: Male     Birth control/protection: Male Surgical, Pill     Comment:  had testicular cancer   Lifestyle     Physical activity:     Days per week: Not on file     Minutes per session: Not on file     Stress: Not on file   Relationships  "    Social connections:     Talks on phone: Not on file     Gets together: Not on file     Attends Restorationism service: Not on file     Active member of club or organization: Not on file     Attends meetings of clubs or organizations: Not on file     Relationship status: Not on file     Intimate partner violence:     Fear of current or ex partner: Not on file     Emotionally abused: Not on file     Physically abused: Not on file     Forced sexual activity: Not on file   Other Topics Concern     Parent/sibling w/ CABG, MI or angioplasty before 65F 55M? Not Asked   Social History Narrative    Dairy/d 1 servings/d.     Caffeine 1 servings/d    Exercise 1-2 x week    Sunscreen used - Yes    Seatbelts used - Yes    Working smoke/CO detectors in the home - No    Guns stored in the home - Yes    Self Breast Exams - Sometimes    Self Testicular Exam - NOT APPLICABLE    Eye Exam up to date - No    Dental Exam up to date - Yes    Pap Smear up to date - Yes    Mammogram up to date - NOT APPLICABLE    PSA up to date - NOT APPLICABLE    Dexa Scan up to date - NOT APPLICABLE    Flex Sig / Colonoscopy up to date - NOT APPLICABLE    Immunizations up to date - Yes    Abuse: Current or Past(Physical, Sexual or Emotional)- No    Do you feel safe in your environment - Yes        FAMILY HISTORY:   Family History   Problem Relation Age of Onset     Thyroid Disease Mother      Dementia Mother      Arthritis Maternal Grandmother         osteoarthritis      Cancer Maternal Grandfather         bone     Neurologic Disorder Paternal Grandmother         brain tumor     Gastrointestinal Disease Other         cousin on dads side has celiac     Thyroid Disease Maternal Aunt         EXAM:Vitals: /82 (BP Location: Left arm, Cuff Size: Adult Regular)   Ht 1.702 m (5' 7\")   Wt 79.8 kg (176 lb)   LMP 09/26/2019 (Exact Date)   BMI 27.57 kg/m     BMI= Body mass index is 27.57 kg/m .    General appearance: Patient is alert and fully cooperative " with history & exam.  No sign of distress is noted during the visit.     Psychiatric: Affect is pleasant & appropriate.  Patient appears motivated to improve health.     Respiratory: Breathing is regular & unlabored while sitting.     HEENT: Hearing is intact to spoken word.  Speech is clear.  No gross evidence of visual impairment that would impact ambulation.     Vascular: DP & PT pulses are intact & regular bilaterally.  No significant edema or varicosities noted.  CFT and skin temperature is normal to both lower extremities.     Neurologic: Lower extremity sensation is intact to light touch.  No evidence of weakness or contracture in the lower extremities.  No evidence of neuropathy.    Dermatologic: Skin is intact to both lower extremities with adequate texture, turgor and tone about the integument.  No paronychia or evidence of soft tissue infection is noted.     Musculoskeletal: Patient is ambulatory without assistive device or brace.  Functional hallux limitus noted bilateral with symptoms on the left only.  When the foot is unloaded approximately 50 degrees total range of motion is noted left first MPJ however when its unloaded about 10 degrees of dorsiflexion noted with discomfort achiness.  No tracking is noted but very minimal medial or lateral movement noted.    Radiographs demonstrate abnormal metatarsal length parabola long first metatarsal long proximal phalanx on the left foot.  Square or rigid head shape noted.     ASSESSMENT:       ICD-10-CM    1. Acquired hallux limitus of left foot M20.22 XR Foot Left G/E 3 Views     ORTHOTICS REFERRAL        PLAN:  Reviewed patient's chart in [facility].      10/17/2019   Discussed etiology and treatment options regarding hallux limitus.  Order for custom molded orthotics  Written instructions regarding proper shoe gear with more stiffness through the shank to reduce flexion and recruitment of movement.  Avoid high heels and flexible flimsy shoes.  Discussed oral  anti-inflammatories and injections  Discussed surgical treatments including decompression osteotomy and arthrodesis.  All questions were answered  Follow-up as needed with questions or persistent symptoms.      Jt Jaimes DPM      Again, thank you for allowing me to participate in the care of your patient.        Sincerely,        Jt Jaimes DPM

## 2019-10-17 NOTE — PROGRESS NOTES
HPI:  Nithya Tolentino is a 43 year old female who is seen in consultation at the request of Cierra Burdick PA-C.    Pt presents for eval of:   (Onset, Location, L/R, Character, Treatments, Injury if yes)    XR Left foot today 10/17/2019     Onset July 2019, dorsal, medial Left great toe pain. No injury noted. Presents today with unsupportive slip on casual shoes. Patient states that she wears walking shoes and supportive sandals when she is more active.  Constant, dull ache, Intermittent, swelling, sharp, stabbing, throbbing, pain 1-7  Tylenol, naproxen    Works as a Mental Health Therapist.    Weight management plan: Patient was referred to their PCP to discuss a diet and exercise plan.     Patient to follow up with Primary Care provider regarding elevated blood pressure.    ROS:  10 point ROS neg other than the symptoms noted above in the HPI.    Patient Active Problem List   Diagnosis     Seasonal allergic rhinitis     Dysmenorrhea     CARDIOVASCULAR SCREENING; LDL GOAL LESS THAN 160     Restless legs syndrome (RLS)     Family history of supraventricular tachycardia     Other chronic sinusitis     Rhinosinusitis     Premenopausal menorrhagia       PAST MEDICAL HISTORY:   Past Medical History:   Diagnosis Date     Dysmenorrhea      Environmental allergies         PAST SURGICAL HISTORY:   Past Surgical History:   Procedure Laterality Date     NO HISTORY OF SURGERY          MEDICATIONS:   Current Outpatient Medications:      acetaminophen (TYLENOL) 500 MG tablet, Take 1-2 tablets by mouth every 6 hours as needed. 2 tablets before bedtime, Disp: , Rfl:      calcium-magnesium (CALMAG) 500-250 MG TABS per tablet, Take 1 tablet by mouth daily, Disp: , Rfl:      cyanocobalamin (VITAMIN  B-12) 1000 MCG tablet, Take 1,000 mcg by mouth daily, Disp: , Rfl:      fexofenadine (ALLEGRA) 180 MG tablet, Take 1 tablet (180 mg) by mouth daily, Disp: 30 tablet, Rfl: 1     fish oil-omega-3 fatty acids 1000 MG capsule, Take 2 g by  mouth 2 times daily, Disp: , Rfl:      mometasone (NASONEX) 50 MCG/ACT nasal spray, USE 2 SPRAYS IN EACH NOSTRIL DAILY, Disp: 51 g, Rfl: 1     naproxen (NAPROSYN) 500 MG tablet, Take 1 tablet (500 mg) by mouth 2 times daily as needed for moderate pain, Disp: 60 tablet, Rfl: 3     norethindrone-ethinyl estradiol (JUNEL FE 1/20) 1-20 MG-MCG tablet, Take 1 tablet by mouth daily, Disp: 84 tablet, Rfl: 2     Pseudoephedrine HCl (SUDAFED PO), Take 120 mg by mouth, Disp: , Rfl:      Pseudoephedrine-Guaifenesin (MUCINEX D PO), Take 600 mg by mouth, Disp: , Rfl:      triamcinolone (KENALOG) 0.1 % external cream, Apply topically 2 times daily, Disp: 80 g, Rfl: 0     ALLERGIES:    Allergies   Allergen Reactions     Augmentin Nausea and Vomiting        SOCIAL HISTORY:   Social History     Socioeconomic History     Marital status:      Spouse name: Not on file     Number of children: Not on file     Years of education: Not on file     Highest education level: Not on file   Occupational History     Not on file   Social Needs     Financial resource strain: Not on file     Food insecurity:     Worry: Not on file     Inability: Not on file     Transportation needs:     Medical: Not on file     Non-medical: Not on file   Tobacco Use     Smoking status: Never Smoker     Smokeless tobacco: Never Used   Substance and Sexual Activity     Alcohol use: Not Currently     Comment: occ/  once a month      Drug use: No     Sexual activity: Yes     Partners: Male     Birth control/protection: Male Surgical, Pill     Comment:  had testicular cancer   Lifestyle     Physical activity:     Days per week: Not on file     Minutes per session: Not on file     Stress: Not on file   Relationships     Social connections:     Talks on phone: Not on file     Gets together: Not on file     Attends Pentecostalism service: Not on file     Active member of club or organization: Not on file     Attends meetings of clubs or organizations: Not on file     " Relationship status: Not on file     Intimate partner violence:     Fear of current or ex partner: Not on file     Emotionally abused: Not on file     Physically abused: Not on file     Forced sexual activity: Not on file   Other Topics Concern     Parent/sibling w/ CABG, MI or angioplasty before 65F 55M? Not Asked   Social History Narrative    Dairy/d 1 servings/d.     Caffeine 1 servings/d    Exercise 1-2 x week    Sunscreen used - Yes    Seatbelts used - Yes    Working smoke/CO detectors in the home - No    Guns stored in the home - Yes    Self Breast Exams - Sometimes    Self Testicular Exam - NOT APPLICABLE    Eye Exam up to date - No    Dental Exam up to date - Yes    Pap Smear up to date - Yes    Mammogram up to date - NOT APPLICABLE    PSA up to date - NOT APPLICABLE    Dexa Scan up to date - NOT APPLICABLE    Flex Sig / Colonoscopy up to date - NOT APPLICABLE    Immunizations up to date - Yes    Abuse: Current or Past(Physical, Sexual or Emotional)- No    Do you feel safe in your environment - Yes        FAMILY HISTORY:   Family History   Problem Relation Age of Onset     Thyroid Disease Mother      Dementia Mother      Arthritis Maternal Grandmother         osteoarthritis      Cancer Maternal Grandfather         bone     Neurologic Disorder Paternal Grandmother         brain tumor     Gastrointestinal Disease Other         cousin on dads side has celiac     Thyroid Disease Maternal Aunt         EXAM:Vitals: /82 (BP Location: Left arm, Cuff Size: Adult Regular)   Ht 1.702 m (5' 7\")   Wt 79.8 kg (176 lb)   LMP 09/26/2019 (Exact Date)   BMI 27.57 kg/m    BMI= Body mass index is 27.57 kg/m .    General appearance: Patient is alert and fully cooperative with history & exam.  No sign of distress is noted during the visit.     Psychiatric: Affect is pleasant & appropriate.  Patient appears motivated to improve health.     Respiratory: Breathing is regular & unlabored while sitting.     HEENT: Hearing " is intact to spoken word.  Speech is clear.  No gross evidence of visual impairment that would impact ambulation.     Vascular: DP & PT pulses are intact & regular bilaterally.  No significant edema or varicosities noted.  CFT and skin temperature is normal to both lower extremities.     Neurologic: Lower extremity sensation is intact to light touch.  No evidence of weakness or contracture in the lower extremities.  No evidence of neuropathy.    Dermatologic: Skin is intact to both lower extremities with adequate texture, turgor and tone about the integument.  No paronychia or evidence of soft tissue infection is noted.     Musculoskeletal: Patient is ambulatory without assistive device or brace.  Functional hallux limitus noted bilateral with symptoms on the left only.  When the foot is unloaded approximately 50 degrees total range of motion is noted left first MPJ however when its unloaded about 10 degrees of dorsiflexion noted with discomfort achiness.  No tracking is noted but very minimal medial or lateral movement noted.    Radiographs demonstrate abnormal metatarsal length parabola long first metatarsal long proximal phalanx on the left foot.  Square or rigid head shape noted.     ASSESSMENT:       ICD-10-CM    1. Acquired hallux limitus of left foot M20.22 XR Foot Left G/E 3 Views     ORTHOTICS REFERRAL        PLAN:  Reviewed patient's chart in [facility].      10/17/2019   Discussed etiology and treatment options regarding hallux limitus.  Order for custom molded orthotics  Written instructions regarding proper shoe gear with more stiffness through the shank to reduce flexion and recruitment of movement.  Avoid high heels and flexible flimsy shoes.  Discussed oral anti-inflammatories and injections  Discussed surgical treatments including decompression osteotomy and arthrodesis.  All questions were answered  Follow-up as needed with questions or persistent symptoms.      Jt Jaimes DPM

## 2019-10-17 NOTE — PATIENT INSTRUCTIONS
Reliable shoe stores: To maximize your experience and provide the best possible fit.  Be sure to show them your foot concerns and tell them Dr. Jaimes sent you.      Stores listed in bold have only athletic shoes, and stores that are not bold are mostly casual or variety of shoes    [employer]  [address] [place] [address]  [phone]    [facility] [employer] [facility] [place]  [address] [place] [address]  [phone]     [employer]  [address] [place] [address]  [phone]    [employer]  [address] [facility]  [facility] [address]  [phone]    [employer]  [address] [place], [place] [address] [phone]    [employer]  [address] [place] [address]  [phone]                         [employer] Locations:     [address] [place] [address]   [phone]     [address] [place] [address] [address] [place] [address]   [phone]     [address] [place] [address]   [phone]     [address] [facility] [place] [address]   [phone]     [address] [facility] [place] [address]   [phone]     [address] [place] [address]   [phone]     [address] [facility] [address]   [place] [address]   [phone]     [address] [facility]   [place] [address]   [phone]             How to find reasonable shoes          The correct width    Correct Fitting    Correct Length      Foot Distortion    Posture Distortion                          Torsional Rigidity      Grasp behind the heel and underneath the foot and twist      Bad    Excessive torsion/twist in midfoot     Less torsion/twist in midfoot is better                   Heel Counter Rigidity      Grasp just above   midsole and squeeze      Bad    Soft heel counter      Good    Rigid Heel Counter      Flexion Rigidity      Grasp shoe and bend from forefoot to rearfoot              DEGENERATIVE ARTHRITIS OF THE BIG TOE JOINT  "(hallux limitus/hallux rigidus)   Arthritis of the joint at the base of the big toe (metatarsophalangeal joint) has several causes. Usually it results from repetitive trauma to the joint, secondary to abnormal foot mechanics. Often it is hereditary. However, a one-time traumatic event can lead to arthritis. The condition barragan,sn't improve with time, and even with treatment, can worsen. The cartilage wears out, joint surfaces are no longer smooth, bone rubs on bone, inflammation occurs with pain, and eventually bone spurs and loose fragments might develop.   The joint is often painful with activity, worse with flimsy shoes or walking barefoot, and it slowly progresses over time. A person might notice the toe \"locking up\" with walking. There often is an obvious, and irritating, bony bump on top of the foot. Shoes might be uncomfortable. In some people the pain is so bothersome that recreational activities sometimes even normal daily activities are difficult to perform.   The pain from this arthritis is likely a combination of joint jamming, cartilage loss and inflammation, and irritation from shoes rubbing on the bump. Sometimes other parts of the foot, leg, or back hurt from altering one's walk to compensate for the painful jOint.     Ways to help a person live with the discomfort include wearing a good, supportive shoe with a rigid, rocker-type bottom. An example is a hiking boot. A rigid sole minimizes bending of the joint, and therefore, joint motion and pain. Shoes with a high toe box allow for less rubbing on the bump. Avoiding barefoot walking, sandals, flip-flops and slippers usually helps.   Sometimes an insert or orthotic provides symptom relief. This might make shoe fit more difficult. Pads over the bump and occasionally injections into the joint provide relief.   Surgery for this condition is aimed towards alleviating pain. It does not cure the arthritis nor does it guarantee better joint motion. Depending " on the condition of the metatarsophalangeal joint, there are several surgiqal options:    1.  Cutting off the bony bump(s) and cleaning the joint    2.  Loosening the joint up by making cuts in the first metatarsal bone or the big toe bone and removing a small section of bone.    3.  Repositioning bone to minimize jamming of the joint.    4.  In severe cases, the joint is fused. By fusing the joint, it will never bend again. This resolves the pain, because it's the movement of a worn out jOint that causes pain. Oftentimes the operation involves a combination of these procedures and. requires the use of screws, pins, and/or a small surgical plate.     Healing after surgery requires about six weeks of protection. This allows the bone to heal. Maximum recovery takes about one year. The scar tissue and joint structures require this amount of time to finish the healing process. Expect stiffness, swelling and numbness during that time frame.   Surgery for arthritis of the metatarsophalangeal joint does involve side effects. Some side effects are predictable and others are less common but do occur. A scar will be visible and could be irritated by shoes. The shoe may rub on the screw or internal pin requiring surgical removal of these fixation devices. The screw and pin would likely be left in place for a full year. The first toe may remain stiff after surgery. The amount of stiffness is variable. Most people never regain normal motion of the first toe. This is due to scar tissue inherent to any surgery, in addition to the cumUlative effects of arthritis. Sometimes the big toe drifts to one side or the other. Joint fusion is one option to correct an unstable, drifting toe. This procedure straightens the toe, however, no motion remains.   All surgical procedures involve risk of infection, numbness, pain, delayed bone healing, osteotomy (bone cut) dislocation, blood clots, continued foot pain, etc. Arthritic joint surgery is  quite complex and should not be taken lightly.    Any skin incision can lead to infection. Deep infection might involve the bone and thus repeat surgery and six weeks of IV antibiotics. Scar tissue can cause nerve pain or numbness. his is generally temporary but can be permanent. We do not have treatments that cure nerve problems. Second toe pain could be related to altered mechanics and pressure transferred to the second toe. Delayed bone healing would lengthen the healing time. Some bones simply do not heal. This requires repeat surgery, electronic bone stimulation and/or extended protection. Smokers have an approximate 20% chance of poor bone healing. This is double that of a non-smoker. The bone cut may displace. This may need to be repaired with a second operation. Displacement can cause joint malalignment. Immobility after surgery can cause a blood clot in the legs and lungs. This could result in death.   Foot pain is complex. Most feet hurt for more than one reason. Operating on the arthritic   big toe joint will not necessarily create a pain free foot. Appropriate shoes, healthy body weight, avoidance of bare foot walking and moderation of activity will always be   necessary to enjoy foot comfort. Arthritis is incurable even with surgery.     Surgery for this type of arthritis is nevertheless quite successful. Most surgical patients are pleased with their foot following surgery. Many of the issues described above can be controlled by taking proper care of your foot during the healing process.   Cosmetic bump surgery is discouraged for the reasons listed above. A bump and joint that is comfortable when wearing appropriate shoes should simply be treated with appropriate shoes.   Your surgeon would be happy to fully describe any of the above issues. You should pursue a full understanding of the operation, recovery process and any potential problems that could develop.

## 2019-12-05 ENCOUNTER — OFFICE VISIT (OUTPATIENT)
Dept: OBGYN | Facility: OTHER | Age: 44
End: 2019-12-05
Payer: COMMERCIAL

## 2019-12-05 ENCOUNTER — APPOINTMENT (OUTPATIENT)
Dept: LAB | Facility: OTHER | Age: 44
End: 2019-12-05
Payer: COMMERCIAL

## 2019-12-05 ENCOUNTER — TELEPHONE (OUTPATIENT)
Dept: OBGYN | Facility: OTHER | Age: 44
End: 2019-12-05

## 2019-12-05 VITALS
HEIGHT: 67 IN | SYSTOLIC BLOOD PRESSURE: 118 MMHG | HEART RATE: 68 BPM | WEIGHT: 177.5 LBS | DIASTOLIC BLOOD PRESSURE: 72 MMHG | BODY MASS INDEX: 27.86 KG/M2

## 2019-12-05 DIAGNOSIS — Z84.89: ICD-10-CM

## 2019-12-05 DIAGNOSIS — Z13.1 SCREENING FOR DIABETES MELLITUS: ICD-10-CM

## 2019-12-05 DIAGNOSIS — Z83.49 FAMILY HISTORY OF THYROID DISEASE: ICD-10-CM

## 2019-12-05 DIAGNOSIS — J32.8 OTHER CHRONIC SINUSITIS: ICD-10-CM

## 2019-12-05 DIAGNOSIS — Z13.220 SCREENING CHOLESTEROL LEVEL: ICD-10-CM

## 2019-12-05 DIAGNOSIS — J32.9 RHINOSINUSITIS: ICD-10-CM

## 2019-12-05 DIAGNOSIS — Z83.719 FAMILY HISTORY OF COLONIC POLYPS: ICD-10-CM

## 2019-12-05 DIAGNOSIS — N94.6 DYSMENORRHEA: ICD-10-CM

## 2019-12-05 DIAGNOSIS — Z01.419 ENCOUNTER FOR GYNECOLOGICAL EXAMINATION WITHOUT ABNORMAL FINDING: Primary | ICD-10-CM

## 2019-12-05 LAB
CHOLEST SERPL-MCNC: 189 MG/DL
GLUCOSE SERPL-MCNC: 92 MG/DL (ref 70–99)
HDLC SERPL-MCNC: 79 MG/DL
LDLC SERPL CALC-MCNC: 85 MG/DL
NONHDLC SERPL-MCNC: 110 MG/DL
TRIGL SERPL-MCNC: 127 MG/DL
TSH SERPL DL<=0.005 MIU/L-ACNC: 0.65 MU/L (ref 0.4–4)

## 2019-12-05 PROCEDURE — 84443 ASSAY THYROID STIM HORMONE: CPT | Performed by: ADVANCED PRACTICE MIDWIFE

## 2019-12-05 PROCEDURE — 82947 ASSAY GLUCOSE BLOOD QUANT: CPT | Performed by: ADVANCED PRACTICE MIDWIFE

## 2019-12-05 PROCEDURE — 36415 COLL VENOUS BLD VENIPUNCTURE: CPT | Performed by: ADVANCED PRACTICE MIDWIFE

## 2019-12-05 PROCEDURE — 80061 LIPID PANEL: CPT | Performed by: ADVANCED PRACTICE MIDWIFE

## 2019-12-05 PROCEDURE — 99396 PREV VISIT EST AGE 40-64: CPT | Performed by: ADVANCED PRACTICE MIDWIFE

## 2019-12-05 RX ORDER — MOMETASONE FUROATE MONOHYDRATE 50 UG/1
2 SPRAY, METERED NASAL DAILY
Qty: 51 G | Refills: 3 | Status: SHIPPED | OUTPATIENT
Start: 2019-12-05 | End: 2020-08-14

## 2019-12-05 RX ORDER — NORETHINDRONE ACETATE AND ETHINYL ESTRADIOL 1MG-20(21)
1 KIT ORAL DAILY
Qty: 84 TABLET | Refills: 3 | Status: SHIPPED | OUTPATIENT
Start: 2019-12-05 | End: 2020-11-03

## 2019-12-05 ASSESSMENT — MIFFLIN-ST. JEOR: SCORE: 1487.76

## 2019-12-05 NOTE — NURSING NOTE
"Chief Complaint   Patient presents with     Physical       Initial /72 (BP Location: Right arm, Patient Position: Sitting, Cuff Size: Adult Large)   Pulse 68   Ht 1.702 m (5' 7\")   Wt 80.5 kg (177 lb 8 oz)   LMP 11/27/2019 (Exact Date)   BMI 27.80 kg/m   Estimated body mass index is 27.8 kg/m  as calculated from the following:    Height as of this encounter: 1.702 m (5' 7\").    Weight as of this encounter: 80.5 kg (177 lb 8 oz).  Medication Reconciliation: complete    Lana Lindsay CMA    "

## 2019-12-05 NOTE — PROGRESS NOTES
SUBJECTIVE:   CC: Nithya Tolentino is an 44 year old woman who presents for preventive health visit.     Healthy Habits:     Getting at least 3 servings of Calcium per day:  Yes    Bi-annual eye exam:  Yes    Dental care twice a year:  NO    Sleep apnea or symptoms of sleep apnea:  None    Diet:  Regular (no restrictions)    Frequency of exercise:  None    Taking medications regularly:  Yes    Medication side effects:  Not applicable    PHQ-2 Total Score: 0    Additional concerns today:  Yes          Check spot on chest    Today's PHQ-2 Score:   PHQ-2 ( 1999 [employer]) 12/5/2019   Q1: Little interest or pleasure in doing things 0   Q2: Feeling down, depressed or hopeless 0   PHQ-2 Score 0   Q1: Little interest or pleasure in doing things Not at all   Q2: Feeling down, depressed or hopeless Not at all   PHQ-2 Score 0       Abuse: Current or Past(Physical, Sexual or Emotional)- No  Do you feel safe in your environment? Yes        Social History     Tobacco Use     Smoking status: Never Smoker     Smokeless tobacco: Never Used   Substance Use Topics     Alcohol use: Not Currently     Comment: occ/  once a month      If you drink alcohol do you typically have >3 drinks per day or >7 drinks per week? No    Alcohol Use 12/5/2019   Prescreen: >3 drinks/day or >7 drinks/week? Not Applicable   Prescreen: >3 drinks/day or >7 drinks/week? -   No flowsheet data found.    Reviewed orders with patient.  Reviewed health maintenance and updated orders accordingly - Yes  Labs reviewed in EPIC  BP Readings from Last 3 Encounters:   12/05/19 118/72   10/17/19 122/82   10/03/19 124/80    Wt Readings from Last 3 Encounters:   12/05/19 80.5 kg (177 lb 8 oz)   10/17/19 79.8 kg (176 lb)   10/03/19 79.8 kg (176 lb)                  Patient Active Problem List   Diagnosis     Seasonal allergic rhinitis     Dysmenorrhea     CARDIOVASCULAR SCREENING; LDL GOAL LESS THAN 160     Restless legs syndrome (RLS)     Family history of supraventricular  tachycardia     Other chronic sinusitis     Rhinosinusitis     Premenopausal menorrhagia     Past Surgical History:   Procedure Laterality Date     NO HISTORY OF SURGERY         Social History     Tobacco Use     Smoking status: Never Smoker     Smokeless tobacco: Never Used   Substance Use Topics     Alcohol use: Not Currently     Comment: occ/  once a month      Family History   Problem Relation Age of Onset     Thyroid Disease Mother      Dementia Mother      Arthritis Maternal Grandmother         osteoarthritis      Cancer Maternal Grandfather         bone     Neurologic Disorder Paternal Grandmother         brain tumor     Gastrointestinal Disease Other         cousin on dads side has celiac     Thyroid Disease Maternal Aunt          Current Outpatient Medications   Medication Sig Dispense Refill     acetaminophen (TYLENOL) 500 MG tablet Take 1-2 tablets by mouth every 6 hours as needed. 2 tablets before bedtime       calcium-magnesium (CALMAG) 500-250 MG TABS per tablet Take 1 tablet by mouth daily       cyanocobalamin (VITAMIN  B-12) 1000 MCG tablet Take 1,000 mcg by mouth daily       fexofenadine (ALLEGRA) 180 MG tablet Take 1 tablet (180 mg) by mouth daily 30 tablet 1     fish oil-omega-3 fatty acids 1000 MG capsule Take 2 g by mouth 2 times daily       mometasone (NASONEX) 50 MCG/ACT nasal spray Spray 2 sprays into both nostrils daily 51 g 3     naproxen (NAPROSYN) 500 MG tablet Take 1 tablet (500 mg) by mouth 2 times daily as needed for moderate pain 60 tablet 3     norethindrone-ethinyl estradiol (JUNEL FE 1/20) 1-20 MG-MCG tablet Take 1 tablet by mouth daily 84 tablet 3     Pseudoephedrine HCl (SUDAFED PO) Take 120 mg by mouth       Pseudoephedrine-Guaifenesin (MUCINEX D PO) Take 600 mg by mouth         Mammogram Screening: Patient under age 50, mutual decision reflected in health maintenance.      Pertinent mammograms are reviewed under the imaging tab.  History of abnormal Pap smear:   PAP / HPV  "Latest Ref Rng & Units 11/29/2016 10/2/2013   PAP - NIL NIL   HPV 16 DNA NEG Negative -   HPV 18 DNA NEG Negative -   OTHER HR HPV NEG Negative -     Reviewed and updated as needed this visit by clinical staff  Tobacco  Allergies  Meds  Med Hx  Surg Hx  Fam Hx  Soc Hx        Reviewed and updated as needed this visit by Provider            Review of Systems  CONSTITUTIONAL: NEGATIVE for fever, chills, change in weight  INTEGUMENTARU/SKIN: NEGATIVE for worrisome rashes, moles or lesions  EYES: NEGATIVE for vision changes or irritation  ENT: NEGATIVE for ear, mouth and throat problems  RESP: NEGATIVE for significant cough or SOB  BREAST: NEGATIVE for masses, tenderness or discharge  CV: NEGATIVE for chest pain, palpitations or peripheral edema  GI: NEGATIVE for nausea, abdominal pain, heartburn, or change in bowel habits  : NEGATIVE for unusual urinary or vaginal symptoms. Periods are regular.  MUSCULOSKELETAL: NEGATIVE for significant arthralgias or myalgia  NEURO: NEGATIVE for weakness, dizziness or paresthesias  PSYCHIATRIC: NEGATIVE for changes in mood or affect     OBJECTIVE:   /72 (BP Location: Right arm, Patient Position: Sitting, Cuff Size: Adult Large)   Pulse 68   Ht 1.702 m (5' 7\")   Wt 80.5 kg (177 lb 8 oz)   LMP 11/27/2019 (Exact Date)   BMI 27.80 kg/m    Physical Exam  GENERAL: healthy, alert and no distress  EYES: Eyes grossly normal to inspection, PERRL and conjunctivae and sclerae normal  HENT: ear canals and TM's normal, nose and mouth without ulcers or lesions  NECK: no adenopathy, no asymmetry, masses, or scars and thyroid normal to palpation  RESP: lungs clear to auscultation - no rales, rhonchi or wheezes  BREAST: normal without masses, tenderness or nipple discharge and no palpable axillary masses or adenopathy  CV: regular rate and rhythm, normal S1 S2, no S3 or S4, no murmur, click or rub, no peripheral edema and peripheral pulses strong  ABDOMEN: soft, nontender, no " "hepatosplenomegaly, no masses and bowel sounds normal  MS: no gross musculoskeletal defects noted, no edema  SKIN: no suspicious lesions or rashes  NEURO: Normal strength and tone, mentation intact and speech normal  PSYCH: mentation appears normal, affect normal/bright    Diagnostic Test Results:  No results found for this or any previous visit (from the past 24 hour(s)).    ASSESSMENT/PLAN:   (Z01.419) Encounter for gynecological examination without abnormal finding  (primary encounter diagnosis)  Comment:   Plan:     (Z13.1) Screening for diabetes mellitus  Comment:   Plan: Glucose            (Z13.220) Screening cholesterol level  Comment:   Plan: Lipid panel reflex to direct LDL Fasting            (Z83.49) Family history of thyroid disease  Comment:   Plan: TSH with free T4 reflex            (N94.6) Dysmenorrhea  Comment:   Plan: norethindrone-ethinyl estradiol (JUNEL FE 1/20)        1-20 MG-MCG tablet            (Z84.89) Family history of neoplasm of skin  Comment:   Plan: DERMATOLOGY REFERRAL            (J32.8) Other chronic sinusitis  Comment: Plan: mometasone (NASONEX) 50 MCG/ACT nasal spray            (J32.9) Rhinosinusitis  Comment:   Plan: mometasone (NASONEX) 50 MCG/ACT nasal spray            (Z83.71) Family history of colonic polyps  Comment:   Plan: GASTROENTEROLOGY ADULT REF PROCEDURE ONLY [facility] [phone]; No Preference - GI         Provider Only          Has a family member with polyps and would like to do colonoscopy.   Recommended she contact her insurance for coverage first      COUNSELING:  Reviewed preventive health counseling, as reflected in patient instructions       Vision screening       Hearing screening       Contraception    Estimated body mass index is 27.8 kg/m  as calculated from the following:    Height as of this encounter: 1.702 m (5' 7\").    Weight as of this encounter: 80.5 kg (177 lb 8 oz).         reports that she has never smoked. She has never used " smokeless tobacco.      Counseling Resources:  ATP IV Guidelines  Pooled Cohorts Equation Calculator  Breast Cancer Risk Calculator  FRAX Risk Assessment  ICSI Preventive Guidelines  Dietary Guidelines for Americans, 2010  USDA's MyPlate  ASA Prophylaxis  Lung CA Screening    [facility]

## 2019-12-05 NOTE — TELEPHONE ENCOUNTER
This patient was in for labs this morning.     Please place any orders you would like completed and we will do our best in filling them.    Thank you,  Susie

## 2020-03-17 ENCOUNTER — VIRTUAL VISIT (OUTPATIENT)
Dept: FAMILY MEDICINE | Facility: OTHER | Age: 45
End: 2020-03-17

## 2020-03-17 NOTE — PROGRESS NOTES
"Date: 2020 08:42:36  Clinician: Dion Matta  Clinician NPI: [id number]  Patient: Nithya Tolentino  Patient : 1975  Patient Address: [address] [place] [address]  Patient Phone: [phone]  Visit Protocol: URI  Patient Summary:  Nithya is a 44 year old ( : 1975 ) female who initiated a Visit for COVID-19 (Coronavirus) evaluation and screening. When asked the question \"Please sign me up to receive news, health information and promotions. \", Nithya responded \"No\".    Nithya states her symptoms started suddenly 3-6 days ago. After her symptoms started, they improved and then got worse again.   Her symptoms consist of nasal congestion, a headache, enlarged lymph nodes, and facial pain or pressure. She is experiencing difficulty breathing due to nasal congestion but she is not short of breath.   Symptom details     Nasal secretions: The color of her mucus is clear and white.    Facial pain or pressure: The facial pain or pressure feels worse when bending over or leaning forward.     Headache: She states the headache is mild (1-3 on a 10 point pain scale).      Nithya denies having wheezing, sore throat, cough, teeth pain, fever, chills, ear pain, malaise, rhinitis, and myalgias. She also denies taking antibiotic medication for the symptoms, having a sinus infection within the past year, and having recent facial or sinus surgery in the past 60 days.    Pertinent COVID-19 (Coronavirus) information  Nithya has not traveled internationally or to the areas where COVID-19 (Coronavirus) is widespread in the last 14 days before the start of her symptoms.   Nithya has not had a close contact with a laboratory-confirmed COVID-19 patient within 14 days of symptom onset. She also has not had a close contact with a suspected COVID-19 patient within 14 days of symptom onset.   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   Pertinent medical history  Nithya typically gets a yeast " infection when she takes antibiotics. She has used fluconazole (Diflucan) to treat previous yeast infections. 1 dose of fluconazole (Diflucan) has typically been sufficient for symptoms to resolve in the past.   Nithya does not need a return to work/school note.   Weight: 168 lbs   Nithya does not smoke or use smokeless tobacco.   She denies pregnancy and denies breastfeeding. She is currently menstruating.   Additional information as reported by the patient (free text): My main symptom is a mild tickle and tightness in my chest for the past three days.   Weight: 168 lbs    MEDICATIONS: Junel FE 1/20 (28) oral, mometasone-formoterol inhalation, ALLERGIES: Augmentin  Clinician Response:  Dear Nithya,  Based on the information provided, you have viral sinusitis, also known as a sinus infection. Sinus infections are caused by bacteria or a virus and symptoms are almost always identical. The difference between the 2 types of infections is timing.  Sinus infections start as viral infections and symptoms improve on their own in about 7 days. If symptoms have not improved after 7 days or have even worsened, a bacterial infection may have developed.  Medication information  Because you have a viral infection, antibiotics will not help you get better. Treating a viral infection with antibiotics could actually make you feel worse.  Unless you are allergic to the over-the-counter medication(s) below, I recommend using:       Acetaminophen (Tylenol or store brand) oral tablet. Take 1-2 tablets by mouth every 4-6 hours to help with the discomfort.      Ibuprofen (Advil or store brand) 200 mg oral tablet. Take 1-3 tablets (200-600 mg) by mouth every 8 hours to help with the discomfort. Make sure to take the ibuprofen with food. Do not exceed 2400 mg in 24 hours.    An antihistamine such as Benadryl, Claritin, or store brand.    A decongestant such as Sudafed PE or store brand.     Over-the-counter medications do not require a  prescription. Ask the pharmacist if you have any questions.  Self care  The following tips will keep you as comfortable as possible while you recover:     Rest    Drink plenty of water and other liquids    Take a hot shower to loosen congestion     When to seek care  Please be seen in a clinic or urgent care if new symptoms develop, or symptoms become worse.  COVID-19 (Coronavirus) General Information  With the increase in the number of COVID-19 (Coronavirus) cases, we understand you may have some questions. Below is some helpful information on COVID-19 (Coronavirus).  How can I protect myself and others from the COVID-19 (Coronavirus)?  Because there is currently no vaccine to prevent infection, the best way to protect yourself is to avoid being exposed to this virus. Put distance between yourself and other people if COVID-19 (Coronavirus) is spreading in your community. The virus is thought to spread mainly from person-to-person.     Between people who are in close contact with one another (within about 6 about) for prolonged period (10 minutes or longer).    Through respiratory droplets produced when an infected person coughs or sneezes.     The CDC recommends the following additional steps to protect yourself and others:     Wash your hands often with soap and water for at least 20 seconds, especially after blowing your nose, coughing, or sneezing; going to the bathroom; and before eating or preparing food.  Use an alcohol-based hand  that contains at least 60 percent alcohol if soap and water are not available.        Avoid touching your eyes, nose and mouth with unwashed hands.    Avoid close contact with people who are sick.    Stay home when you are sick.    Cover your cough or sneeze with a tissue, then throw the tissue in the trash.    Clean and disinfect frequently touched objects and surfaces.     You can help stop COVID-19 (Coronavirus) by knowing the signs and symptoms:     Fever    Cough     Shortness of breath     Contact your healthcare provider if   Develop symptoms   AND   Have been in close contact with a person known to have COVID-19 (Coronavirus) or live in or have recently traveled from an area with ongoing spread of COVID-19 (Coronavirus). Call ahead before you go to a doctor's office or emergency room. Tell them about your recent travel and your symptoms.   For the most up to date information, visit the CDC's website.  Steps to help prevent the spread of COVID-19 (Coronavirus) if you are sick  If you are sick with COVID-19 (Coronavirus) or suspect you are infected with the virus that causes COVID-19 (Coronavirus), follow the steps below to help prevent the disease from spreading&nbsp;to people in your home and community.     Stay home except to get medical care. Home isolation may be started in consultation with your healthcare clinician.    Separate yourself from other people and animals in your home.    Call ahead before visiting your doctor if you have a medical appointment.    Wear a facemask when you are around other people.    Cover your cough and sneezes.    Clean your hands often.    Avoid sharing personal household items.    Clean and disinfect frequently touched objects and surfaces everyday.    You will need to have someone drop off medications or household supplies (if needed) at your house without coming inside or in contact with you or others living in your house.    Monitor your symptoms and seek prompt medical care if your illness is worsening (e.g. Difficulty breathing).    Discontinue home isolation only in consultation with your healthcare provider.     For more detailed and up to date information on what to do if you are sick, visit this link: What to Do If You Are Sick With Coronavirus Disease 2019 (COVID-19).  Do I need to be tested for COVID-19 (Coronavirus)?     At this time, the limited number of tests available are controlled by the state and local health departments  "and are being reserved for more seriously ill patients, those with known exposure to confirmed patients, and those with recent travel (within 14 days) to countries with high rates of COVID-19 (Coronavirus).    Decisions on which patients receive testing will be based on the local spread of COVID-19 (Coronavirus) as well as the symptoms. Your healthcare provider will make the final decision on whether you should be tested.    In the meantime, if you have concerns that you may have been exposed, it is reasonable to practice \"social distancing.\"&nbsp; If you are ill with a cold or flu-like illness, please monitor your symptoms and reach out to your healthcare provider if your symptoms worsen.    For more up to date information, visit this link: COVID-19 (Coronavirus) Frequently Asked Questions and Answers.      Diagnosis: Viral sinusitis  Diagnosis ICD: J01.90  Additional Clinician Notes: Risk of Covid 19 seems low based on the information you gave me.  I do not think you need to be tested for Covid 19.  However, you should self-isolate for at least 1 week, or until your symptoms are completely resolved, whichever comes later.  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  "

## 2020-03-17 NOTE — PROGRESS NOTES
"Date: 2020 11:33:25  Clinician: Kana Medrano  Clinician NPI: [id number]  Patient: Nithya Tolentino  Patient : 1975  Patient Address: [address], [place] [address]  Patient Phone: [phone]  Visit Protocol: URI  Patient Summary:  Nithya is a 44 year old ( : 1975 ) female who initiated a Visit for COVID-19 (Coronavirus) evaluation and screening. When asked the question \"Please sign me up to receive news, health information and promotions. \", Nithya responded \"No\".    Nithya states her symptoms started suddenly 3-6 days ago. After her symptoms started, they improved and then got worse again.   Her symptoms consist of a sore throat, nasal congestion, ear pain, a headache, enlarged lymph nodes, and facial pain or pressure. She is experiencing difficulty breathing due to nasal congestion but she is not short of breath.   Symptom details     Nasal secretions: The color of her mucus is clear and white.    Sore throat: Nithya reports having mild throat pain (1-3 on a 10 point pain scale), does not have exudate on her tonsils, and can swallow liquids. The lymph nodes in her neck are enlarged. A rash has not appeared on the skin since the sore throat started.     Facial pain or pressure: The facial pain or pressure feels worse when bending over or leaning forward.     Headache: She states the headache is mild (1-3 on a 10 point pain scale).      Nithya denies having wheezing, cough, teeth pain, fever, chills, malaise, rhinitis, and myalgias. She also denies taking antibiotic medication for the symptoms, having a sinus infection within the past year, and having recent facial or sinus surgery in the past 60 days.   Precipitating events  Nithya is not sure if she has been exposed to someone with strep throat.   Pertinent COVID-19 (Coronavirus) information  Nithya has not traveled internationally or to the areas where COVID-19 (Coronavirus) is widespread in the last 14 days before the start of her " symptoms.   Nithya has not had a close contact with a laboratory-confirmed COVID-19 patient within 14 days of symptom onset. She also has not had a close contact with a suspected COVID-19 patient within 14 days of symptom onset.   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   Pertinent medical history  Nithya typically gets a yeast infection when she takes antibiotics. She has used fluconazole (Diflucan) to treat previous yeast infections. 1 dose of fluconazole (Diflucan) has typically been sufficient for symptoms to resolve in the past.   Nithya does not need a return to work/school note.   Weight: 168 lbs   Nithya does not smoke or use smokeless tobacco.   She denies pregnancy and denies breastfeeding. She is currently menstruating.   Additional information as reported by the patient (free text): My daughter (age 16) was at a friend's house on and off last week, over the weekend, and last night; the friend was also at our house for several hours on March 13. 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 I have some upper respiratory symptoms over the past 3 days. My question is this: Should our family self-quarantine? Are we considered exposed to coronavirus and contagious?   Weight: 168 lbs    MEDICATIONS: Sudafed 12 Hour oral, Mucinex oral, mometasone nasal, Junel FE 1/20 (28) oral, ALLERGIES: Augmentin  Clinician Response:  Dear Nithya,  I am sorry you are not feeling well. Your health is our priority. Based on the information you have provided, it is possible that you may have some type of viral infection.  Please read the full treatment plan and see my recommendations below.  Medication information  Because you have a viral infection, antibiotics will not help you get better. Treating a viral infection with antibiotics could actually make you feel worse.  Unless you are allergic to the  over-the-counter medication(s) below, I recommend using:       Acetaminophen (Tylenol or store brand) oral tablet. Take 1-2 tablets by mouth every 4-6 hours to help with the discomfort.    A decongestant such as Sudafed PE or store brand.     Over-the-counter medications do not require a prescription. Ask the pharmacist if you have any questions.  Self care  The following tips will keep you as comfortable as possible while you recover:     Rest    Drink plenty of water and other liquids    Take a hot shower to loosen congestion    Use throat lozenges    Gargle with warm salt water (1/4 teaspoon of salt per 8 ounce glass of water)    Suck on frozen items such as popsicles or ice cubes    Drink hot tea with lemon and honey     COVID-19 (Coronavirus) General Information  With the increase in the number of COVID-19 (Coronavirus) cases, we understand you may have some questions. Below is some helpful information on COVID-19 (Coronavirus).  How can I protect myself and others from the COVID-19 (Coronavirus)?  Because there is currently no vaccine to prevent infection, the best way to protect yourself is to avoid being exposed to this virus. Put distance between yourself and other people if COVID-19 (Coronavirus) is spreading in your community. The virus is thought to spread mainly from person-to-person.     Between people who are in close contact with one another (within about 6 about) for prolonged period (10 minutes or longer).    Through respiratory droplets produced when an infected person coughs or sneezes.     The CDC recommends the following additional steps to protect yourself and others:     Wash your hands often with soap and water for at least 20 seconds, especially after blowing your nose, coughing, or sneezing; going to the bathroom; and before eating or preparing food.  Use an alcohol-based hand  that contains at least 60 percent alcohol if soap and water are not available.        Avoid touching  your eyes, nose and mouth with unwashed hands.    Avoid close contact with people who are sick.    Stay home when you are sick.    Cover your cough or sneeze with a tissue, then throw the tissue in the trash.    Clean and disinfect frequently touched objects and surfaces.     You can help stop COVID-19 (Coronavirus) by knowing the signs and symptoms:     Fever    Cough    Shortness of breath     Contact your healthcare provider if   Develop symptoms   AND   Have been in close contact with a person known to have COVID-19 (Coronavirus) or live in or have recently traveled from an area with ongoing spread of COVID-19 (Coronavirus). Call ahead before you go to a doctor's office or emergency room. Tell them about your recent travel and your symptoms.   For the most up to date information, visit the CDC's website.  Steps to help prevent the spread of COVID-19 (Coronavirus) if you are sick  If you are sick with COVID-19 (Coronavirus) or suspect you are infected with the virus that causes COVID-19 (Coronavirus), follow the steps below to help prevent the disease from spreading&nbsp;to people in your home and community.     Stay home except to get medical care. Home isolation may be started in consultation with your healthcare clinician.    Separate yourself from other people and animals in your home.    Call ahead before visiting your doctor if you have a medical appointment.    Wear a facemask when you are around other people.    Cover your cough and sneezes.    Clean your hands often.    Avoid sharing personal household items.    Clean and disinfect frequently touched objects and surfaces everyday.    You will need to have someone drop off medications or household supplies (if needed) at your house without coming inside or in contact with you or others living in your house.    Monitor your symptoms and seek prompt medical care if your illness is worsening (e.g. Difficulty breathing).    Discontinue home isolation only in  "consultation with your healthcare provider.     For more detailed and up to date information on what to do if you are sick, visit this link: What to Do If You Are Sick With Coronavirus Disease 2019 (COVID-19).  Do I need to be tested for COVID-19 (Coronavirus)?     At this time, the limited number of tests available are controlled by the state and local health departments and are being reserved for more seriously ill patients, those with known exposure to confirmed patients, and those with recent travel (within 14 days) to countries with high rates of COVID-19 (Coronavirus).    Decisions on which patients receive testing will be based on the local spread of COVID-19 (Coronavirus) as well as the symptoms. Your healthcare provider will make the final decision on whether you should be tested.    In the meantime, if you have concerns that you may have been exposed, it is reasonable to practice \"social distancing.\"&nbsp; If you are ill with a cold or flu-like illness, please monitor your symptoms and reach out to your healthcare provider if your symptoms worsen.    For more up to date information, visit this link: COVID-19 (Coronavirus) Frequently Asked Questions and Answers.      Diagnosis: COVID-19 (Coronavirus) concern  Diagnosis ICD: Z03.818  "

## 2020-05-14 ENCOUNTER — VIRTUAL VISIT (OUTPATIENT)
Dept: DERMATOLOGY | Facility: CLINIC | Age: 45
End: 2020-05-14
Payer: COMMERCIAL

## 2020-05-14 DIAGNOSIS — L82.1 SEBORRHEIC KERATOSIS: ICD-10-CM

## 2020-05-14 DIAGNOSIS — L57.8 SUN-DAMAGED SKIN: Primary | ICD-10-CM

## 2020-05-14 DIAGNOSIS — L81.4 SOLAR LENTIGO: ICD-10-CM

## 2020-05-14 PROCEDURE — 99202 OFFICE O/P NEW SF 15 MIN: CPT | Mod: GT | Performed by: DERMATOLOGY

## 2020-05-14 RX ORDER — FLUTICASONE PROPIONATE 50 MCG
1 SPRAY, SUSPENSION (ML) NASAL DAILY PRN
COMMUNITY
End: 2022-04-25

## 2020-05-14 NOTE — PROGRESS NOTES
[facility] [place] [place]:  Store and Forward and Video ( Invitation sent by:  Marielle and text to cell phone: [phone] )      Impression and Recommendations (Patient Counseled on the Following):  1. SK, right zygomatic arch: Reassured of benign appearance in photos today. DIscussed watching for pain at the site or bleeding, which would be concerning for a skin cancer. Patient noted understanding.     2. Facial solar lentigines: Discussed that these are a marker of past sun damage. Recommended TBSE in the next year or so.       Follow-up:   PRN     Staff only:    Yuliya Wilkerson MD    Department of Dermatology  [facility]: Phone: [phone], Fax:[phone]  [facility]: Phone: [phone], Fax: [phone]          _____________________________________________________________________________    Dermatology Problem List:  1. 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 Family history of melanoma in a cousin    Encounter Date: May 14, 2020    CC:   Chief Complaint   Patient presents with     Derm Problem       History of Present Illness:  I have reviewed the teledermatology information and the nursing intake corresponding to this issue. Nithya Tolentino is a 44 year old female who presents via teledermatology for lesion of concern on the right cheek.    She first noticed this spot in January. Her dad has had precancerous removed in the past, noticed the spot, and told her she should get it checked out. It does not hurt or bleed. It is dry, almost feels like she can pick the dryness off.      ROS: Patient is generally feeling well today    Physical Examination:  General: Well-appearing female, appropriately-developed individual.  Skin: Focused examination within the teledermatology photograph(s) including face was performed.   -Brown, waxy, barely elevated <4mm papule on  the right cheek at the zygomatic arch  -Scattered solar lentigines on the face    Labs:  None    Past Medical History:   Patient Active Problem List   Diagnosis     Seasonal allergic rhinitis     Dysmenorrhea     CARDIOVASCULAR SCREENING; LDL GOAL LESS THAN 160     Restless legs syndrome (RLS)     Family history of supraventricular tachycardia     Other chronic sinusitis     Rhinosinusitis     Premenopausal menorrhagia     Past Medical History:   Diagnosis Date     Dysmenorrhea      Environmental allergies      Past Surgical History:   Procedure Laterality Date     NO HISTORY OF SURGERY         Social History:  Patient reports that she has never smoked. She has never used smokeless tobacco. She reports previous alcohol use. She reports that she does not use drugs. Spent summers on the [place]. No tanning bed history.    Family History:  Family History   Problem Relation Age of Onset     Thyroid Disease Mother      Dementia Mother      Arthritis Maternal Grandmother         osteoarthritis      Cancer Maternal Grandfather         bone     Neurologic Disorder Paternal Grandmother         brain tumor     Gastrointestinal Disease Other         cousin on dads side has celiac     Thyroid Disease Maternal Aunt    Dad: precancers  Uncles: precancers and skin cancers (likely NMSC)  1st cousin: melanoma    Medications:  Current Outpatient Medications   Medication     acetaminophen (TYLENOL) 500 MG tablet     calcium-magnesium (CALMAG) 500-250 MG TABS per tablet     cyanocobalamin (VITAMIN  B-12) 1000 MCG tablet     fexofenadine (ALLEGRA) 180 MG tablet     fish oil-omega-3 fatty acids 1000 MG capsule     mometasone (NASONEX) 50 MCG/ACT nasal spray     naproxen (NAPROSYN) 500 MG tablet     norethindrone-ethinyl estradiol (JUNEL FE 1/20) 1-20 MG-MCG tablet     Pseudoephedrine HCl (SUDAFED PO)     Pseudoephedrine-Guaifenesin (MUCINEX D PO)     No current facility-administered medications for this visit.   "         Allergies   Allergen Reactions     Augmentin Nausea and Vomiting         _____________________________________________________________________________    Teledermatology information:  - Location of patient: Home  - Patient presented as: self referral  - Location of teledermatologist:  ([facility] )  - Reason teledermatology is appropriate:  of National Emergency Regarding Coronavirus disease (COVID 19) Outbreak  - Image quality and interpretability: acceptable  - Physician has received verbal consent for a Video/Photos Visit from the patient? Yes  - In-person dermatology visit recommendation: no  - Date of images: 5/13/20  - Service start time:7:54  - Service end time:8:05  - Date of report: 5/14/2020     Teledermatology Nurse Call for NEW Patients (not seen in last 3 years):     The patient was contacted by phone and we reviewed, \"Due to the coronavirus pandemic, we are calling to reschedule your upcoming visit and offer you a teledermatology visit. This would be assessed by an MD or PAMartinC;  Importantly, \"a teledermatology visit may not be as thorough as an in-person visit, and the quality of the photograph and/or video sent may not be of the same quality as that taken by the dermatology clinic.\" After discussing the risks, benefits, and alternatives, the patient would like to proceed with teledermatology because of [place] Emergency Regarding Coronavirus disease (COVID 19) Outbreak.\"    This video visit will be conducted via a call between you and your physician/provider via [employer]. We have found that certain health care needs can be provided without the need for an in-person physical exam.  This service lets us provide the care you need with a video conversation.  If a prescription is necessary we can send it directly to your pharmacy.  If lab work is needed we can place an order for that and you can then stop by our lab to have the test done at a later time.If during the course of the " call the physician/provider feels a video visit is not appropriate, you will not be charged for this service.    Patient would like the video invitation sent by: Text to cell phone: [phone]     The patient will also send photographs via [employer] for review.      The patient verified the location of the photo/video to be their home address.      For photos, patient told nursing these are already uploaded     The patient was instructed to take photos of all areas of concern and all areas of any rash.       The patient denied skin pain, fever, mucosal symptoms(lesions, blisters, sores in the mouth, nose, eyes, or genitals). IF PATIENT ENDORSES ANY OF THESE STOP AND PAGE ON CALL ATTENDING    Additional notes:  Patient summary of issue: New bump on face  Location of problem on body: Right cheekbone  How long has area/symptoms been present: 5 months  What makes it better?: N/A  What makes it worse?: N/A  Other symptoms include the following: Growing. Seems to get rough and dry.  Which mediations have been tried, for how long, and did they make it better or worse (ex. Triamcinolone, used twice daily for 2 weeks, not improved): N/A  The patient has not seen a dermatologist in the past.   The patient hasno past medical history of skin cancer  ROS: The patient is generally feeling well.       Nursing tasks completed  -Pharmacy preference was updated.  -The nurse has dropped in the AVS information *(For adults the phrase is umdermhteleavs and for pediatrics it is their own) for the physician to route in the AVS.                                                                                                                                                                                                                         -The patient was told to contact the clinic if they have not received correspondence within 72 hours.     Madalyn Prather LPN

## 2020-05-14 NOTE — PATIENT INSTRUCTIONS
[facility] Teledermatology Visit    Thank you for allowing us to participate in your care. Your findings, instructions and follow-up plan are as follows:    Elevated spot on right cheek: seborrheic keratosis (fancy medical term for an age related skin growth). Nothing further to do at this time. If area becomes tender or bleds, let us know right away.     Sun freckles: This is a sign of past sun damage. Once pandemic has passed, it would be a good idea to come in for a full skin check since you do have signs of sun damage.     When should I call my doctor?    If you are worsening or not improving, please, contact us or seek urgent care as noted below.     Who should I call with questions (adults)?    [facility] (adult and pediatric): [phone]     [facility] (adult): [phone]    For urgent needs outside of business hours call the [facility] at [phone] and ask for the dermatology resident on call    If this is a medical emergency and you are unable to reach an ER, Call [phone]      Who should I call with questions (pediatric)?  [facility]- Pediatric Dermatology  Dr. Coby Cruz, Dr. Jan Peace, Dr. Amelia Calderon, ERIC [address] Dr. Bernie Barnard & Dr. Erick Hartman  Non Urgent  Nurse Triage Line; [phone]- Jonna and [facility] Care Coordinators   Francia (/Complex ) [phone]    If you need a prescription refill, please contact your pharmacy. Refills are approved or denied by our Physicians during normal business hours, Monday through Fridays  Per office policy, refills will not be granted if you have not been seen within the past year (or sooner depending on your child's condition)    Scheduling Information:  Pediatric Appointment Scheduling and Call Center [phone]  Radiology Scheduling- [phone]  Sedation Unit  Scheduling- [phone]  [place] Scheduling- General [phone]; Pediatric Dermatology [phone]  Main  Services: [phone]  Persian: [phone]  Iraqi: [phone]  Hmong/Hernesto/Yakut: [phone]  Preadmission Nursing Department Fax Number: [phone] (Fax all pre-operative paperwork to this number)    For urgent matters arising during evenings, weekends, or holidays that cannot wait for normal business hours please call [phone] and ask for the Dermatology Resident On-Call to be paged.

## 2020-08-14 ENCOUNTER — MYC REFILL (OUTPATIENT)
Dept: OBGYN | Facility: OTHER | Age: 45
End: 2020-08-14

## 2020-08-14 DIAGNOSIS — J32.9 RHINOSINUSITIS: ICD-10-CM

## 2020-08-14 DIAGNOSIS — J32.8 OTHER CHRONIC SINUSITIS: ICD-10-CM

## 2020-08-14 RX ORDER — MOMETASONE FUROATE MONOHYDRATE 50 UG/1
2 SPRAY, METERED NASAL DAILY
Qty: 51 G | Refills: 0 | Status: SHIPPED | OUTPATIENT
Start: 2020-08-14 | End: 2020-11-03

## 2020-08-14 NOTE — TELEPHONE ENCOUNTER
"Nasal Allergy Protocol Passed     Rerun Protocol  (8/14/2020 12:07 PM)       Patient is age 12 or older         Recent (12 mo) or future (30 days) visit within the authorizing provider's specialty     Patient has had an office visit with the authorizing provider or a provider within the authorizing providers department within the previous 12 mos or has a future within next 30 days. See \"Patient Info\" tab in inbasket, or \"Choose Columns\" in Meds & Orders section of the refill encounter.              Medication is active on med list        Medication: mometasone (NASONEX) 50 MCG/ACT nasal spray   Last Written Prescription Date:  12/5/2019  Last Fill Quantity: 51g  # refills: 3  Last Office Visit: 12/5/2019  Future Office visit: No appt for annual scheduled at this time. She will be due in Dec of 2020.     Prescription approved per [facility] Refill Protocol.      Penny Fitzgerald RN on 8/14/2020 at 12:16 PM        "

## 2020-10-14 ENCOUNTER — TELEPHONE (OUTPATIENT)
Dept: FAMILY MEDICINE | Facility: OTHER | Age: 45
End: 2020-10-14

## 2020-10-14 DIAGNOSIS — Z12.31 ENCOUNTER FOR SCREENING MAMMOGRAM FOR BREAST CANCER: Primary | ICD-10-CM

## 2020-10-29 ENCOUNTER — TELEPHONE (OUTPATIENT)
Dept: FAMILY MEDICINE | Facility: OTHER | Age: 45
End: 2020-10-29

## 2020-10-29 NOTE — TELEPHONE ENCOUNTER
JKD ordered Mammogram - will route encounter to JKD. Pended potential order to review/sign, if appropriate.  Roxanne Cali, CMA

## 2020-10-29 NOTE — TELEPHONE ENCOUNTER
Left message for patient to make an appt. To see May as May will not order diagnostic mammogram without seeing patient in clinic.  Lana Lindsay CMA

## 2020-10-29 NOTE — TELEPHONE ENCOUNTER
Message from radiology that patient was at [facility] for a mammogram but states it needs to be diagnostic  Pt of [facility] TA

## 2020-11-03 ENCOUNTER — OFFICE VISIT (OUTPATIENT)
Dept: OBGYN | Facility: OTHER | Age: 45
End: 2020-11-03
Payer: COMMERCIAL

## 2020-11-03 VITALS
HEART RATE: 68 BPM | BODY MASS INDEX: 27.8 KG/M2 | DIASTOLIC BLOOD PRESSURE: 66 MMHG | SYSTOLIC BLOOD PRESSURE: 124 MMHG | WEIGHT: 177.5 LBS | TEMPERATURE: 97.2 F

## 2020-11-03 DIAGNOSIS — N64.59 BREAST SYMPTOM: Primary | ICD-10-CM

## 2020-11-03 DIAGNOSIS — J30.89 SEASONAL ALLERGIC RHINITIS DUE TO OTHER ALLERGIC TRIGGER: ICD-10-CM

## 2020-11-03 DIAGNOSIS — J32.8 OTHER CHRONIC SINUSITIS: ICD-10-CM

## 2020-11-03 DIAGNOSIS — J32.9 RHINOSINUSITIS: ICD-10-CM

## 2020-11-03 DIAGNOSIS — N94.6 DYSMENORRHEA: ICD-10-CM

## 2020-11-03 PROCEDURE — 99213 OFFICE O/P EST LOW 20 MIN: CPT | Performed by: ADVANCED PRACTICE MIDWIFE

## 2020-11-03 RX ORDER — MOMETASONE FUROATE MONOHYDRATE 50 UG/1
2 SPRAY, METERED NASAL DAILY
Qty: 51 G | Refills: 0 | Status: SHIPPED | OUTPATIENT
Start: 2020-11-03 | End: 2020-12-03

## 2020-11-03 RX ORDER — NORETHINDRONE ACETATE AND ETHINYL ESTRADIOL 1MG-20(21)
1 KIT ORAL DAILY
Qty: 84 TABLET | Refills: 3 | Status: SHIPPED | OUTPATIENT
Start: 2020-11-03 | End: 2021-03-30
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
## 2020-12-03 DIAGNOSIS — J30.89 SEASONAL ALLERGIC RHINITIS DUE TO OTHER ALLERGIC TRIGGER: ICD-10-CM

## 2020-12-03 RX ORDER — MOMETASONE FUROATE MONOHYDRATE 50 UG/1
2 SPRAY, METERED NASAL DAILY
Qty: 51 G | Refills: 0 | Status: SHIPPED | OUTPATIENT
Start: 2020-12-03 | End: 2021-03-08

## 2020-12-03 NOTE — TELEPHONE ENCOUNTER
Prescription approved per [facility] Refill Protocol.  Jordi Glass RN  December 3, 2020

## 2020-12-08 ENCOUNTER — VIRTUAL VISIT (OUTPATIENT)
Dept: DERMATOLOGY | Facility: CLINIC | Age: 45
End: 2020-12-08
Payer: COMMERCIAL

## 2020-12-08 DIAGNOSIS — D48.5 NEOPLASM OF UNCERTAIN BEHAVIOR OF SKIN: Primary | ICD-10-CM

## 2020-12-08 PROCEDURE — 99212 OFFICE O/P EST SF 10 MIN: CPT | Mod: GT | Performed by: DERMATOLOGY

## 2020-12-08 ASSESSMENT — PAIN SCALES - GENERAL: PAINLEVEL: NO PAIN (0)

## 2020-12-08 NOTE — NURSING NOTE
"Teledermatology Nurse Call for RETURN patients seen within the last 3 years:      The patient was contacted by phone and we reviewed they have a visit in teledermatology upcoming with an MD or MANOJ;  Importantly, \"a teledermatology visit may not be as thorough as an in-person visit, and the quality of the photograph and/or video sent may not be of the same quality as that taken by the dermatology clinic.\"     We have found that certain health care needs can be provided without the need for an in-person physical exam.   If a prescription is necessary we can send it directly to your pharmacy.  If lab work is needed we can place an order for that and you can then stop by our lab to have the test done at a later time.Visits are billed at different rates depending on your insurance coverage. Please reach out to your insurance provider with any questions.    The patient chose to:                                                                                                                                                                                                                 Consent to a teledermatology visit with [employer] photos. Patient told by nursing these are already uploaded. Instructions sent to patient via [employer]. They verified they will be in the state of [place] at the time of the encounter.                                                                                                                                                                                                                                     The patient denied skin pain, fever, mucosal symptoms(lesions, blisters, sores in the mouth, nose, eyes, or genitals)  IF PATIENT ENDORSES ANY OF THESE STOP AND PAGE ON CALL ATTENDING        1. Left lower leg (outter) lesion; had it since July. Bleeds when she shaves. Painful with pressure. Initially was itching and thought was a bug bite. She then thought it was a wart and treated with essential " oils - not resolving. Feels raised, pink and rough. No current treatements.     LXIONG3, MEDICAL ASSISTANT

## 2020-12-08 NOTE — LETTER
12/8/2020         RE: Nithya Tolentino  [address] [facility] [place] [address]        Dear Colleague,    Thank you for referring your patient, Nithya Tolentino, to the [facility]. Please see a copy of my visit note below.    [facility] Dermatology Record:  Video: ( Invitation sent by:  [employer] waiting room ). Provider video is frozen. Patient video functioning.       Dermatology Problem List:  1. 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 Family history of melanoma in a cousin       Encounter Date: Dec 8, 2020    CC:   Chief Complaint   Patient presents with     Derm Problem       History of Present Illness:  Nithya Tolentino is a 45 year old female who presents for left lower leg lesion since July. Bleeds when shaves if she shaves over it and catches it. Painful with pressure. Thought it was a bug bite. Treated with essential oils when she believed it was a wart. Raised, red, rough.    No personal hx skin cancer.     She would like to know what this lesion is but not sure she wants removed      ROS: Patient is generally feeling well today      Physical Examination:  General: Well-appearing  appropriately-developed individual.  Skin: Focused examination including was performed.   -well demarcated, dome shaped red brown pink papule on the lower leg                  Labs:  NA    Past Medical History:   Patient Active Problem List   Diagnosis     Seasonal allergic rhinitis     Dysmenorrhea     CARDIOVASCULAR SCREENING; LDL GOAL LESS THAN 160     Restless legs syndrome (RLS)     Family history of supraventricular tachycardia     Other chronic sinusitis     Rhinosinusitis     Premenopausal menorrhagia     Past Medical History:   Diagnosis Date     Dysmenorrhea      Environmental allergies      Past Surgical History:   Procedure Laterality Date     NO HISTORY OF SURGERY         Social History:  Patient reports that she has never smoked. She has never used smokeless tobacco.  She reports current alcohol use. She reports that she does not use drugs.    Family History:  Family History   Problem Relation Age of Onset     Thyroid Disease Mother      Dementia Mother      Arthritis Maternal Grandmother         osteoarthritis      Cancer Maternal Grandfather         bone     Neurologic Disorder Paternal Grandmother         brain tumor     Gastrointestinal Disease Other         cousin on dads side has celiac     Thyroid Disease Maternal Aunt        Medications:  Current Outpatient Medications   Medication     acetaminophen (TYLENOL) 500 MG tablet     cyanocobalamin (VITAMIN  B-12) 1000 MCG tablet     fexofenadine (ALLEGRA) 180 MG tablet     fish oil-omega-3 fatty acids 1000 MG capsule     fluticasone (FLONASE) 50 MCG/ACT nasal spray     mometasone (NASONEX) 50 MCG/ACT nasal spray     naproxen (NAPROSYN) 500 MG tablet     norethindrone-ethinyl estradiol (JUNEL FE 1/20) 1-20 MG-MCG tablet     No current facility-administered medications for this visit.           Allergies   Allergen Reactions     Augmentin Nausea and Vomiting           Impression and Recommendations (Patient Counseled on the Following):  1. Papule, left possibly dermatofibroma or neurotized nevus or PG  -recommend in person assessment and possibly shave biopsy, she would like it examined and removed due to catching.      2. Recheck lesion on right zygomatic arch  seen by telederm in past       Follow-up:   Follow-up with dermatology in approximately for in person assessment and biopsy. Earlier for new or changing lesions or rash. Reach out if you have not heard from clinic in 1 week     Staff only    Lexie Hernandez MD    Department of Dermatology  [facility]: Phone: [phone], Fax:[phone]  [facility]: Phone: [phone], Fax:  [phone]      _____________________________________________________________________________    Teledermatology information:  - Location of patient: [place]  - Patient presented as: return  - Location of teledermatologist:  [facility] )  - Image quality and interpretability: acceptable  - Physician has received verbal consent for a Video/Photos Visit from the patient? YES  - In-person dermatology visit recommendation: yes - for biopsy  - Date of images: yesterday  - Service start time:10:15am  - Service end time:10:22am  - Date of report: 12/8/2020         Again, thank you for allowing me to participate in the care of your patient.        Sincerely,        Lexie Hernandez MD

## 2020-12-08 NOTE — PROGRESS NOTES
[facility] Dermatology Record:  Video: ( Invitation sent by:  [facility] waiting room ). Provider video is frozen. Patient video functioning.       Dermatology Problem List:  1. 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 Family history of melanoma in a cousin       Encounter Date: Dec 8, 2020    CC:   Chief Complaint   Patient presents with     Derm Problem       History of Present Illness:  Nithya Tolentino is a 45 year old female who presents for left lower leg lesion since July. Bleeds when shaves if she shaves over it and catches it. Painful with pressure. Thought it was a bug bite. Treated with essential oils when she believed it was a wart. Raised, red, rough.    No personal hx skin cancer.     She would like to know what this lesion is but not sure she wants removed      ROS: Patient is generally feeling well today      Physical Examination:  General: Well-appearing  appropriately-developed individual.  Skin: Focused examination including was performed.   -well demarcated, dome shaped red brown pink papule on the lower leg                  Labs:  NA    Past Medical History:   Patient Active Problem List   Diagnosis     Seasonal allergic rhinitis     Dysmenorrhea     CARDIOVASCULAR SCREENING; LDL GOAL LESS THAN 160     Restless legs syndrome (RLS)     Family history of supraventricular tachycardia     Other chronic sinusitis     Rhinosinusitis     Premenopausal menorrhagia     Past Medical History:   Diagnosis Date     Dysmenorrhea      Environmental allergies      Past Surgical History:   Procedure Laterality Date     NO HISTORY OF SURGERY         Social History:  Patient reports that she has never smoked. She has never used smokeless tobacco. She reports current alcohol use. She reports that she does not use drugs.    Family History:  Family History   Problem Relation Age of Onset     Thyroid Disease Mother      Dementia Mother      Arthritis Maternal Grandmother         osteoarthritis      Cancer  Maternal Grandfather         bone     Neurologic Disorder Paternal Grandmother         brain tumor     Gastrointestinal Disease Other         cousin on dads side has celiac     Thyroid Disease Maternal Aunt        Medications:  Current Outpatient Medications   Medication     acetaminophen (TYLENOL) 500 MG tablet     cyanocobalamin (VITAMIN  B-12) 1000 MCG tablet     fexofenadine (ALLEGRA) 180 MG tablet     fish oil-omega-3 fatty acids 1000 MG capsule     fluticasone (FLONASE) 50 MCG/ACT nasal spray     mometasone (NASONEX) 50 MCG/ACT nasal spray     naproxen (NAPROSYN) 500 MG tablet     norethindrone-ethinyl estradiol (JUNEL FE 1/20) 1-20 MG-MCG tablet     No current facility-administered medications for this visit.           Allergies   Allergen Reactions     Augmentin Nausea and Vomiting           Impression and Recommendations (Patient Counseled on the Following):  1. Papule, left possibly dermatofibroma or neurotized nevus or PG  -recommend in person assessment and possibly shave biopsy, she would like it examined and removed due to catching.      2. Recheck lesion on right zygomatic arch  seen by telederm in past       Follow-up:   Follow-up with dermatology in approximately for in person assessment and biopsy. Earlier for new or changing lesions or rash. Reach out if you have not heard from clinic in 1 week     Staff only    Lexie Hernandez MD    Department of Dermatology  [facility]: Phone: [phone], Fax:[phone]  [facility]: Phone: [phone], Fax: [phone]      _____________________________________________________________________________    Teledermatology information:  - Location of patient: [place]  - Patient presented as: return  - Location of teledermatologist:  ([facility] )  - Image quality and interpretability: acceptable  - Physician  has received verbal consent for a Video/Photos Visit from the patient? YES  - In-person dermatology visit recommendation: yes - for biopsy  - Date of images: yesterday  - Service start time:10:15am  - Service end time:10:22am  - Date of report: 12/8/2020

## 2020-12-08 NOTE — PATIENT INSTRUCTIONS
[facility] Dermatology Visit    Thank you for allowing us to participate in your care. Your findings, instructions and follow-up plan are as follows:      When should I call my doctor?    If you are worsening or not improving, please, contact us or seek urgent care as noted below.     Who should I call with questions (adults)?    [facility] (adult and pediatric): [phone]     [facility] (adult): [phone]    For urgent needs outside of business hours call the [facility] at [phone] and ask for the dermatology resident on call    If this is a medical emergency and you are unable to reach an ER, Call 911      Who should I call with questions (pediatric)?  [facility]- Pediatric Dermatology  Dr. Coby Cruz, Dr. Jan Peace, Dr. Amelia Calderon, Juliana Lucas, PA  Dr. Aria Valencia, Dr. Bernie Barnard & Dr. Erick Hartman  Non Urgent  Nurse Triage Line; [phone]- Jonna and Paradise RN Care Coordinators   Francia (/Complex ) [phone]    If you need a prescription refill, please contact your pharmacy. Refills are approved or denied by our Physicians during normal business hours, Monday through Fridays  Per office policy, refills will not be granted if you have not been seen within the past year (or sooner depending on your child's condition)    Scheduling Information:  Pediatric Appointment Scheduling and Call Center [phone]  Radiology Scheduling- [phone]  Sedation Unit Scheduling- [phone]  [facility] Scheduling- General [phone]; Pediatric Dermatology [phone]  Main  Services: [phone]  Thai: [phone]  Anguillan: [phone]  Hmong/Setswana/Georgian: [phone]  Preadmission Nursing Department Fax Number: [phone] (Fax all pre-operative paperwork to this number)    For urgent matters arising during evenings,  weekends, or holidays that cannot wait for normal business hours please call [phone] and ask for the Dermatology Resident On-Call to be paged.

## 2020-12-09 ENCOUNTER — TELEPHONE (OUTPATIENT)
Dept: DERMATOLOGY | Facility: CLINIC | Age: 45
End: 2020-12-09

## 2020-12-09 NOTE — TELEPHONE ENCOUNTER
1st attempt. Left message for patient to return call. Patient is due for a in person visit per Dr Hernandez within the next 4 wks with any available dermatologist.     Call Center OKAY TO SCHEDULE.    Thanks,   Leta Zamorano  Surgical Specialties Procedure   [employer] [place]  12/9/2020 10:42 AM

## 2020-12-09 NOTE — LETTER
December 29, 2020      Nithya Tolentino  [address] [place] [facility] [address]        Dear Nithya Tolentino,    In order to ensure that we are providing the best quality care, we would like to remind you that you are due now for a return in person appointment with Dr Hernandez or one of her colleagues.    We have been unable to reach you by phone. Please call your clinic or use [email or web address] to make an appointment with your provider. If any medication is prescribed to you by this provider, please be sure to schedule on or around the date stated above so you do not run out of medication.  Please let us know if you have any questions and we would be happy to help.     Thank you for trusting us with your care.    Sincerely,     [facility]  [phone]

## 2020-12-14 ENCOUNTER — HEALTH MAINTENANCE LETTER (OUTPATIENT)
Age: 45
End: 2020-12-14

## 2020-12-16 NOTE — TELEPHONE ENCOUNTER
2nd attempt to schedule as noted below. Message left for patient to return call and schedule.    Leta Zamorano  Surgical Specialties Procedure   [employer] [place]  12/16/2020 2:23 PM

## 2020-12-29 NOTE — TELEPHONE ENCOUNTER
3rd attempt. Patient has not called to schedule.  Appointment reminder letter sent to patient.      Leta Zamorano  Surgical Specialties Procedure   [employer] [place]  12/29/2020 8:55 AM

## 2021-01-15 ENCOUNTER — HEALTH MAINTENANCE LETTER (OUTPATIENT)
Age: 46
End: 2021-01-15

## 2021-02-19 ENCOUNTER — OFFICE VISIT (OUTPATIENT)
Dept: DERMATOLOGY | Facility: CLINIC | Age: 46
End: 2021-02-19
Payer: COMMERCIAL

## 2021-02-19 DIAGNOSIS — R23.8 PAPULE: Primary | ICD-10-CM

## 2021-02-19 PROCEDURE — 11300 SHAVE SKIN LESION 0.5 CM/<: CPT | Performed by: DERMATOLOGY

## 2021-02-19 PROCEDURE — 88305 TISSUE EXAM BY PATHOLOGIST: CPT | Performed by: PATHOLOGY

## 2021-02-19 ASSESSMENT — PAIN SCALES - GENERAL: PAINLEVEL: NO PAIN (0)

## 2021-02-19 NOTE — PATIENT INSTRUCTIONS
Wound Care After a Biopsy    What is a skin biopsy?  A skin biopsy allows the doctor to examine a very small piece of tissue under the microscope to determine the diagnosis and the best treatment for the skin condition. A local anesthetic (numbing medicine)  is injected with a very small needle into the skin area to be tested. A small piece of skin is taken from the area. Sometimes a suture (stitch) is used.     What are the risks of a skin biopsy?  I will experience scar, bleeding, swelling, pain, crusting and redness. I may experience incomplete removal or recurrence. Risks of this procedure are excessive bleeding, bruising, infection, nerve damage, numbness, thick (hypertrophic or keloidal) scar and non-diagnostic biopsy.    How should I care for my wound for the first 24 hours?    Keep the wound dry and covered for 24 hours    If it bleeds, hold direct pressure on the area for 15 minutes. If bleeding does not stop then go to the emergency room    Avoid strenuous exercise the first 1-2 days or as your doctor instructs you    How should I care for the wound after 24 hours?    After 24 hours, remove the bandage    You may bathe or shower as normal    If you had a scalp biopsy, you can shampoo as usual and can use shower water to clean the biopsy site daily    Clean the wound twice a day with gentle soap and water    Do not scrub, be gentle    Apply white petroleum/Vaseline after cleaning the wound with a cotton swab or a clean finger, and keep the site covered with a Bandaid /bandage. Bandages are not necessary with a scalp biopsy    If you are unable to cover the site with a Bandaid /bandage, re-apply ointment 2-3 times a day to keep the site moist. Moisture will help with healing    Avoid strenuous activity for first 1-2 days    Avoid lakes, rivers, pools, and oceans until the stitches are removed or the site is healed    How do I clean my wound?    Wash hands thoroughly with soap or use hand  before all  wound care    Clean the wound with gentle soap and water    Apply white petroleum/Vaseline  to wound after it is clean    Replace the Bandaid /bandage to keep the wound covered for the first few days or as instructed by your doctor    If you had a scalp biopsy, warm shower water to the area on a daily basis should suffice    What should I use to clean my wound?     Cotton-tipped applicators (Qtips )    White petroleum jelly (Vaseline ). Use a clean new container and use Q-tips to apply.    Bandaids   as needed    Gentle soap     How should I care for my wound long term?    Do not get your wound dirty    Keep up with wound care for one week or until the area is healed.    A small scab will form and fall off by itself when the area is completely healed. The area will be red and will become pink in color as it heals. Sun protection is very important for how your scar will turn out. Sunscreen with an SPF 30 or greater is recommended once the area is healed.      You should have some soreness but it should be mild and slowly go away over several days. Talk to your doctor about using tylenol for pain,    When should I call my doctor?  If you have increased:     Pain or swelling    Pus or drainage (clear or slightly yellow drainage is ok)    Temperature over 100F    Spreading redness or warmth around wound    When will I hear about my results?  The biopsy results can take 2-3 weeks to come back. The clinic will call you with the results, send you a HDmessagingt message, or have you schedule a follow-up clinic or phone time to discuss the results. Contact our clinics if you do not hear from us in 3 weeks.     Who should I call with questions?    [facility]: [phone]     [facility]: [phone]    For urgent needs outside of business hours call the [facility] at [phone] and ask for the dermatology resident on call        [facility]  Health Dermatology Visit    Thank you for allowing us to participate in your care. Your findings, instructions and follow-up plan are as follows:         When should I call my doctor?    If you are worsening or not improving, please, contact us or seek urgent care as noted below.     Who should I call with questions (adults)?    [facility] (adult and pediatric): [phone]     [facility] (adult): [phone]    For urgent needs outside of business hours call the [facility] at [phone] and ask for the dermatology resident on call    If this is a medical emergency and you are unable to reach an ER, Call 911      Who should I call with questions (pediatric)?  [facility]- Pediatric Dermatology  Dr. Coby Cruz, Dr. Jan Peace, Dr. Amelia Calderon, Juliana Lucas, ERIC Valencia, Dr. Bernie Barnard & Dr. Erick Hartman  Non Urgent  Nurse Triage Line; [phone]- Jonna and Paradise RENEE Care Coordinators   Francia (/Complex ) [phone]    If you need a prescription refill, please contact your pharmacy. Refills are approved or denied by our Physicians during normal business hours, Monday through Fridays  Per office policy, refills will not be granted if you have not been seen within the past year (or sooner depending on your child's condition)    Scheduling Information:  Pediatric Appointment Scheduling and Call Center [phone]  Radiology Scheduling- [phone]  Sedation Unit Scheduling- [phone]  [facility] Scheduling- General [phone]; Pediatric Dermatology [phone]  Main  Services: [phone]  Greek: [phone]  Cambodian: [phone]  Hmong/Greenlandic/Lithuanian: [phone]  Preadmission Nursing Department Fax Number: [phone] (Fax all pre-operative paperwork to this number)    For urgent matters arising during evenings, weekends, or holidays that  cannot wait for normal business hours please call [phone] and ask for the Dermatology Resident On-Call to be paged.

## 2021-02-19 NOTE — PROGRESS NOTES
[facility] Dermatology Note  Encounter Date: Feb 19, 2021  Office Visit     Dermatology Problem List:  1. 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 Family history of melanoma in a cousin  3. DF, left lower leg s/p shave removal 2/19/2021  ____________________________________________    Assessment & Plan:  1.Papule consistent withDermatofibroma, left lower leg. Pt catching with shaving. Offered excision and she declines that option.   - Patient would like this removed. See procedure note below.     # SK, right zygomatic arch  - No skin malignancy seen, report changes    Procedures Performed:   After discussion of benefits and risks including but not limited to bleeding, infection, scar, incomplete removal, and non-diagnostic biopsy, written consent and photographs were obtained. The area was cleaned with isopropyl alcohol. 0.5mL of 1% lidocaine was injected to obtain adequate anesthesia of the lesion on the left lower leg, measuring 5mm. A shave removal was performed. Hemostasis was achieved with aluminium chloride. Vaseline and a sterile dressing were applied. The patient tolerated the procedure and no complications were noted. The patient was provided with verbal and written post care instructions.     Follow-up: 1 year(s) in-person, or earlier for new or changing lesions    Staff and Scribe:     Scribe Disclosure  I, Pushpa Allen, am serving as a scribe to document services personally performed by Dr. Lexie Hernandez MD, based on data collection and the provider's statements to me.     Ana Rosa Gutierrez, MS3  Medical Student on Dermatology Service      Provider Disclosure:   The documentation recorded by the scribe accurately reflects the services I personally performed and the decisions made by me.  Staff Physician:  I was present with the medical student who participated in the service and in the documentation of the note. I have verified the history and personally  performed the physical exam and medical decision making. I agree with the assessment and plan of care as documented in the note.     The procedure(s) was(were) performed by myself.       Lexie Hernandez MD    Department of Dermatology  [facility]: Phone: [phone], Fax:[phone]  [facility]: Phone: [phone], Fax: [phone]  2/19/2021      Lexie Hernandez MD    Department of Dermatology  [facility]: Phone: [phone], Fax:[phone]  [facility] [place]: Phone: [phone], Fax: [phone]      ____________________________________________    CC: Derm Problem (Spots recheck, possible bx  - 1. Recheck lesion on right zygomatic - crusty and raised. 2. Papule, left leg - red centered, crusty and raised. )    HPI:  Ms. Nithya Tolentino is a 45 year old female who presents today as a return patient for a spot check on the lower leg. The patient was last seen virtually on 12/8/2020 when an in person examination was recommended.     Today, want lesion on left lower leg removed catching with shaving. Spot on face wants checked    Patient is otherwise feeling well, without additional concerns.     Physical Exam:  Vitals: There were no vitals taken for this visit.  SKIN: Total skin excluding the undergarment areas was performed. The exam included the head/face, neck, both arms, chest, back, abdomen, both legs, digits and/or nails.   - 5mm Symmetric papule with peripheral tan pigmentation that dimples with lateral pressure on the left lower leg.  - righ zygomatic cheek stuck on skin colored papule  - No other lesions of concern on areas examined.     Medications:  Current Outpatient Medications   Medication     acetaminophen (TYLENOL) 500 MG tablet     cyanocobalamin (VITAMIN   B-12) 1000 MCG tablet     fexofenadine (ALLEGRA) 180 MG tablet     fish oil-omega-3 fatty acids 1000 MG capsule     fluticasone (FLONASE) 50 MCG/ACT nasal spray     mometasone (NASONEX) 50 MCG/ACT nasal spray     naproxen (NAPROSYN) 500 MG tablet     norethindrone-ethinyl estradiol (JUNEL FE 1/20) 1-20 MG-MCG tablet     No current facility-administered medications for this visit.       Past Medical History:   Patient Active Problem List   Diagnosis     Seasonal allergic rhinitis     Dysmenorrhea     CARDIOVASCULAR SCREENING; LDL GOAL LESS THAN 160     Restless legs syndrome (RLS)     Family history of supraventricular tachycardia     Other chronic sinusitis     Rhinosinusitis     Premenopausal menorrhagia     Past Medical History:   Diagnosis Date     Dysmenorrhea      Environmental allergies

## 2021-02-19 NOTE — LETTER
2/19/2021         RE: Nithya Tolentino  [address] [facility] [address]        Dear Colleague,    Thank you for referring your patient, Nithya Tolentino, to the [facility]. Please see a copy of my visit note below.    [facility] Dermatology Note  Encounter Date: Feb 19, 2021  Office Visit     Dermatology Problem List:  1. 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 Family history of melanoma in a cousin  3. DF, left lower leg s/p shave removal 2/19/2021  ____________________________________________    Assessment & Plan:  1.Papule consistent withDermatofibroma, left lower leg. Pt catching with shaving. Offered excision and she declines that option.   - Patient would like this removed. See procedure note below.     # SK, right zygomatic arch  - No skin malignancy seen, report changes    Procedures Performed:   After discussion of benefits and risks including but not limited to bleeding, infection, scar, incomplete removal, and non-diagnostic biopsy, written consent and photographs were obtained. The area was cleaned with isopropyl alcohol. 0.5mL of 1% lidocaine was injected to obtain adequate anesthesia of the lesion on the left lower leg, measuring 5mm. A shave removal was performed. Hemostasis was achieved with aluminium chloride. Vaseline and a sterile dressing were applied. The patient tolerated the procedure and no complications were noted. The patient was provided with verbal and written post care instructions.     Follow-up: 1 year(s) in-person, or earlier for new or changing lesions    Staff and Scribe:     Scribe Disclosure  I, Pushpa Allen, am serving as a scribe to document services personally performed by Dr. Lexie Hernandez MD, based on data collection and the provider's statements to me.     Ana Rosa Gutierrez, MS3  Medical Student on Dermatology Service      Provider Disclosure:   The documentation recorded by the scribe  accurately reflects the services I personally performed and the decisions made by me.  Staff Physician:  I was present with the medical student who participated in the service and in the documentation of the note. I have verified the history and personally performed the physical exam and medical decision making. I agree with the assessment and plan of care as documented in the note.     The procedure(s) was(were) performed by myself.       Lexie Hernandez MD    Department of Dermatology  [facility]: Phone: [phone], Fax:[phone]  [facility]: Phone: [phone], Fax: [phone]  2/19/2021      Lexie Hernandez MD    Department of Dermatology  [facility]: Phone: [phone], Fax:[phone]  [facility]: Phone: [phone], Fax: [phone]      ____________________________________________    CC: Derm Problem (Spots recheck, possible bx  - 1. Recheck lesion on right zygomatic - crusty and raised. 2. Papule, left leg - red centered, crusty and raised. )    HPI:  Ms. Nithya Tolentino is a 45 year old female who presents today as a return patient for a spot check on the lower leg. The patient was last seen virtually on 12/8/2020 when an in person examination was recommended.     Today, want lesion on left lower leg removed catching with shaving. Spot on face wants checked    Patient is otherwise feeling well, without additional concerns.     Physical Exam:  Vitals: There were no vitals taken for this visit.  SKIN: Total skin excluding the undergarment areas was performed. The exam included the head/face, neck, both arms, chest, back, abdomen, both legs, digits and/or nails.   - 5mm Symmetric papule with peripheral tan pigmentation that dimples with lateral pressure  on the left lower leg.  - righ zygomatic cheek stuck on skin colored papule  - No other lesions of concern on areas examined.     Medications:  Current Outpatient Medications   Medication     acetaminophen (TYLENOL) 500 MG tablet     cyanocobalamin (VITAMIN  B-12) 1000 MCG tablet     fexofenadine (ALLEGRA) 180 MG tablet     fish oil-omega-3 fatty acids 1000 MG capsule     fluticasone (FLONASE) 50 MCG/ACT nasal spray     mometasone (NASONEX) 50 MCG/ACT nasal spray     naproxen (NAPROSYN) 500 MG tablet     norethindrone-ethinyl estradiol (JUNEL FE 1/20) 1-20 MG-MCG tablet     No current facility-administered medications for this visit.       Past Medical History:   Patient Active Problem List   Diagnosis     Seasonal allergic rhinitis     Dysmenorrhea     CARDIOVASCULAR SCREENING; LDL GOAL LESS THAN 160     Restless legs syndrome (RLS)     Family history of supraventricular tachycardia     Other chronic sinusitis     Rhinosinusitis     Premenopausal menorrhagia     Past Medical History:   Diagnosis Date     Dysmenorrhea      Environmental allergies        Again, thank you for allowing me to participate in the care of your patient.        Sincerely,        Lexie Hernandez MD

## 2021-02-19 NOTE — NURSING NOTE
Nithya Tolentino's goals for this visit include:   Chief Complaint   Patient presents with     Derm Problem     Spots recheck, possible bx  - 1. Recheck lesion on right zygomatic - crusty and raised. 2. Papule, left leg - red centered, crusty and raised.        She requests these members of her care team be copied on today's visit information: N/A     PCP: Lilliana Mayes    Referring Provider:  No referring provider defined for this encounter.    There were no vitals taken for this visit.    Do you need any medication refills at today's visit? No   [id number], MEDICAL ASSISTANT

## 2021-02-19 NOTE — NURSING NOTE
Drug Administration Record    Drug Name: Lidocaine with Epi  Dose: see MD note   Route administered: SQ  NDC #: 6409-6252-20      LOT #: -EV  SITE: see MD note   : [facility]  EXPIRATION DATE: 6/1/2021    JOVANNY, MEDICAL ASSISTANT

## 2021-02-25 LAB — COPATH REPORT: NORMAL

## 2021-02-27 ENCOUNTER — HEALTH MAINTENANCE LETTER (OUTPATIENT)
Age: 46
End: 2021-02-27

## 2021-03-08 ENCOUNTER — MYC REFILL (OUTPATIENT)
Dept: OBGYN | Facility: OTHER | Age: 46
End: 2021-03-08

## 2021-03-08 DIAGNOSIS — J30.89 SEASONAL ALLERGIC RHINITIS DUE TO OTHER ALLERGIC TRIGGER: ICD-10-CM

## 2021-03-09 RX ORDER — MOMETASONE FUROATE MONOHYDRATE 50 UG/1
2 SPRAY, METERED NASAL DAILY
Qty: 17 G | Refills: 0 | Status: SHIPPED | OUTPATIENT
Start: 2021-03-09 | End: 2021-03-30

## 2021-03-09 NOTE — TELEPHONE ENCOUNTER
Pending Prescriptions:                       Disp   Refills    mometasone (NASONEX) 50 MCG/ACT nasal spr*17 g   0            Sig: Spray 2 sprays into both nostrils daily    Medication is being filled for 1 time eduard refill only due to:  Patient is due for annual and med check    Please call and help schedule.  Thank you!

## 2021-03-30 ENCOUNTER — OFFICE VISIT (OUTPATIENT)
Dept: FAMILY MEDICINE | Facility: OTHER | Age: 46
End: 2021-03-30
Payer: COMMERCIAL

## 2021-03-30 VITALS
HEIGHT: 67 IN | SYSTOLIC BLOOD PRESSURE: 118 MMHG | DIASTOLIC BLOOD PRESSURE: 70 MMHG | OXYGEN SATURATION: 97 % | BODY MASS INDEX: 27.78 KG/M2 | HEART RATE: 62 BPM | WEIGHT: 177 LBS | RESPIRATION RATE: 18 BRPM | TEMPERATURE: 98.7 F

## 2021-03-30 DIAGNOSIS — Z12.31 ENCOUNTER FOR SCREENING MAMMOGRAM FOR BREAST CANCER: ICD-10-CM

## 2021-03-30 DIAGNOSIS — J32.0 CHRONIC MAXILLARY SINUSITIS: ICD-10-CM

## 2021-03-30 DIAGNOSIS — Z13.220 LIPID SCREENING: ICD-10-CM

## 2021-03-30 DIAGNOSIS — Z13.1 SCREENING FOR DIABETES MELLITUS: ICD-10-CM

## 2021-03-30 DIAGNOSIS — H69.91 DYSFUNCTION OF RIGHT EUSTACHIAN TUBE: ICD-10-CM

## 2021-03-30 DIAGNOSIS — J30.89 SEASONAL ALLERGIC RHINITIS DUE TO OTHER ALLERGIC TRIGGER: ICD-10-CM

## 2021-03-30 DIAGNOSIS — N94.6 DYSMENORRHEA: ICD-10-CM

## 2021-03-30 DIAGNOSIS — Z12.4 SCREENING FOR MALIGNANT NEOPLASM OF CERVIX: ICD-10-CM

## 2021-03-30 DIAGNOSIS — K59.00 CONSTIPATION, UNSPECIFIED CONSTIPATION TYPE: ICD-10-CM

## 2021-03-30 DIAGNOSIS — Z00.00 ROUTINE GENERAL MEDICAL EXAMINATION AT A HEALTH CARE FACILITY: Primary | ICD-10-CM

## 2021-03-30 PROCEDURE — G0145 SCR C/V CYTO,THINLAYER,RESCR: HCPCS | Performed by: STUDENT IN AN ORGANIZED HEALTH CARE EDUCATION/TRAINING PROGRAM

## 2021-03-30 PROCEDURE — 87624 HPV HI-RISK TYP POOLED RSLT: CPT | Performed by: STUDENT IN AN ORGANIZED HEALTH CARE EDUCATION/TRAINING PROGRAM

## 2021-03-30 PROCEDURE — 99214 OFFICE O/P EST MOD 30 MIN: CPT | Mod: 25 | Performed by: STUDENT IN AN ORGANIZED HEALTH CARE EDUCATION/TRAINING PROGRAM

## 2021-03-30 PROCEDURE — 99396 PREV VISIT EST AGE 40-64: CPT | Performed by: STUDENT IN AN ORGANIZED HEALTH CARE EDUCATION/TRAINING PROGRAM

## 2021-03-30 RX ORDER — NORETHINDRONE ACETATE AND ETHINYL ESTRADIOL 1MG-20(21)
1 KIT ORAL DAILY
Qty: 84 TABLET | Refills: 3 | Status: SHIPPED | OUTPATIENT
Start: 2021-03-30 | End: 2022-02-25

## 2021-03-30 RX ORDER — MOMETASONE FUROATE MONOHYDRATE 50 UG/1
2 SPRAY, METERED NASAL DAILY
Qty: 17 G | Refills: 11 | Status: SHIPPED | OUTPATIENT
Start: 2021-03-30 | End: 2022-09-08

## 2021-03-30 RX ORDER — AZELASTINE 1 MG/ML
2 SPRAY, METERED NASAL 2 TIMES DAILY
Qty: 30 ML | Refills: 11 | Status: SHIPPED | OUTPATIENT
Start: 2021-03-30 | End: 2022-03-01

## 2021-03-30 ASSESSMENT — MIFFLIN-ST. JEOR: SCORE: 1480.62

## 2021-03-30 ASSESSMENT — ENCOUNTER SYMPTOMS
FREQUENCY: 0
NAUSEA: 0
WEAKNESS: 0
DIARRHEA: 0
CHILLS: 0
PARESTHESIAS: 0
MYALGIAS: 0
ABDOMINAL PAIN: 0
HEMATURIA: 0
HEMATOCHEZIA: 0
SORE THROAT: 0
HEARTBURN: 1
CONSTIPATION: 1
EYE PAIN: 0
SHORTNESS OF BREATH: 0
DYSURIA: 0
DIZZINESS: 0
COUGH: 0
HEADACHES: 1
ARTHRALGIAS: 0
BREAST MASS: 0
JOINT SWELLING: 0
FEVER: 0
NERVOUS/ANXIOUS: 1
PALPITATIONS: 0

## 2021-03-30 ASSESSMENT — PAIN SCALES - GENERAL: PAINLEVEL: NO PAIN (1)

## 2021-03-30 NOTE — PROGRESS NOTES
SUBJECTIVE:   CC: Nithya Tolentino is an 45 year old woman who presents for preventive health visit.         Patient has been advised of split billing requirements and indicates understanding: Yes       Healthy Habits:     Getting at least 3 servings of Calcium per day:  Yes    Bi-annual eye exam:  Yes    Dental care twice a year:  NO    Sleep apnea or symptoms of sleep apnea:  None    Diet:  Regular (no restrictions)    Frequency of exercise:  None    Taking medications regularly:  Yes    PHQ-2 Total Score: 0    Additional concerns today:  Yes          Today's PHQ-2 Score:   PHQ-2 ( 1999 Pfizer) 3/30/2021   Q1: Little interest or pleasure in doing things 0   Q2: Feeling down, depressed or hopeless 0   PHQ-2 Score 0   Q1: Little interest or pleasure in doing things Not at all   Q2: Feeling down, depressed or hopeless Not at all   PHQ-2 Score 0       Abuse: Current or Past (Physical, Sexual or Emotional) - No  Do you feel safe in your environment? Yes    Have you ever done Advance Care Planning? (For example, a Health Directive, POLST, or a discussion with a medical provider or your loved ones about your wishes): No, advance care planning information given to patient to review.  Patient declined advance care planning discussion at this time.    Social History     Tobacco Use     Smoking status: Never Smoker     Smokeless tobacco: Never Used   Substance Use Topics     Alcohol use: Yes     Comment: occ/  once a month          Alcohol Use 3/30/2021   Prescreen: >3 drinks/day or >7 drinks/week? No   Prescreen: >3 drinks/day or >7 drinks/week? -       Reviewed orders with patient.  Reviewed health maintenance and updated orders accordingly - Yes  Lab work is in process  Labs reviewed in EPIC  BP Readings from Last 3 Encounters:   03/30/21 118/70   11/03/20 124/66   12/05/19 118/72    Wt Readings from Last 3 Encounters:   03/30/21 80.3 kg (177 lb)   11/03/20 80.5 kg (177 lb 8 oz)   12/05/19 80.5 kg (177 lb 8 oz)                   Patient Active Problem List   Diagnosis     Seasonal allergic rhinitis     Dysmenorrhea     CARDIOVASCULAR SCREENING; LDL GOAL LESS THAN 160     Restless legs syndrome (RLS)     Family history of supraventricular tachycardia     Other chronic sinusitis     Rhinosinusitis     Premenopausal menorrhagia     Past Surgical History:   Procedure Laterality Date     NO HISTORY OF SURGERY         Social History     Tobacco Use     Smoking status: Never Smoker     Smokeless tobacco: Never Used   Substance Use Topics     Alcohol use: Yes     Comment: occ/  once a month      Family History   Problem Relation Age of Onset     Thyroid Disease Mother      Dementia Mother      Arthritis Maternal Grandmother         osteoarthritis      Cancer Maternal Grandfather         bone     Neurologic Disorder Paternal Grandmother         brain tumor     Gastrointestinal Disease Other         cousin on dads side has celiac     Thyroid Disease Maternal Aunt          Current Outpatient Medications   Medication Sig Dispense Refill     acetaminophen (TYLENOL) 500 MG tablet Take 1-2 tablets by mouth every 6 hours as needed. 2 tablets before bedtime       cyanocobalamin (VITAMIN  B-12) 1000 MCG tablet Take 1,000 mcg by mouth daily       fexofenadine (ALLEGRA) 180 MG tablet Take 1 tablet (180 mg) by mouth daily 30 tablet 1     fish oil-omega-3 fatty acids 1000 MG capsule Take 2 g by mouth daily        fluticasone (FLONASE) 50 MCG/ACT nasal spray Spray 1 spray into both nostrils daily as needed for rhinitis or allergies        mometasone (NASONEX) 50 MCG/ACT nasal spray Spray 2 sprays into both nostrils daily 17 g 0     naproxen (NAPROSYN) 500 MG tablet Take 1 tablet (500 mg) by mouth 2 times daily as needed for moderate pain 60 tablet 3     norethindrone-ethinyl estradiol (JUNEL FE 1/20) 1-20 MG-MCG tablet Take 1 tablet by mouth daily 84 tablet 3     Allergies   Allergen Reactions     Augmentin Nausea and Vomiting     Recent Labs   Lab  Test 12/05/19  0930 12/13/18  0743 12/21/17  1043 11/29/16  0939 11/29/16  0939 02/01/16  1006 06/24/15  1022 06/24/15  1022   LDL 85 75 78  --  69  --   --  49   HDL 79 61 63  --  66  --   --  59   TRIG 127 99 128  --  87  --   --  60   ALT  --   --   --   --   --  23  --  16   CR  --   --   --   --  0.82 0.79  --  0.80   GFRESTIMATED  --   --   --   --  77 80  --  79   GFRESTBLACK  --   --   --   --  >90   GFR Calc   >90   GFR Calc    --  >90   GFR Calc     POTASSIUM  --   --   --   --  3.7 4.1  --  4.1   TSH 0.65 0.66 0.40   < > 0.37*  --    < > 0.31*    < > = values in this interval not displayed.        Breast Cancer Screening:    FSH-7:   Breast CA Risk Assessment (FHS-7) 3/30/2021   Did any of your first-degree relatives have breast or ovarian cancer? No   Did any of your relatives have bilateral breast cancer? No   Did any man in your family have breast cancer? No   Did any woman in your family have breast and ovarian cancer? Yes   Did any woman in your family have breast cancer before age 50 y? No   Do you have 2 or more relatives with breast and/or ovarian cancer? No   Do you have 2 or more relatives with breast and/or bowel cancer? No       Mammogram Screening: Recommended annual mammography  Pertinent mammograms are reviewed under the imaging tab.    History of abnormal Pap smear:   NO - age 30-65 PAP every 5 years with negative HPV co-testing recommended  Last 3 Pap and HPV Results:   PAP / HPV Latest Ref Rng & Units 11/29/2016 10/2/2013   PAP - NIL NIL   HPV 16 DNA NEG Negative -   HPV 18 DNA NEG Negative -   OTHER HR HPV NEG Negative -     PAP / HPV Latest Ref Rng & Units 11/29/2016 10/2/2013   PAP - NIL NIL   HPV 16 DNA NEG Negative -   HPV 18 DNA NEG Negative -   OTHER HR HPV NEG Negative -     Reviewed and updated as needed this visit by clinical staff  Tobacco  Allergies  Meds   Med Hx  Surg Hx  Fam Hx  Soc Hx        Reviewed and updated as  needed this visit by Provider                Past Medical History:   Diagnosis Date     Dysmenorrhea      Environmental allergies       Past Surgical History:   Procedure Laterality Date     NO HISTORY OF SURGERY         Review of Systems   Constitutional: Negative for chills and fever.   HENT: Positive for congestion. Negative for ear pain, hearing loss and sore throat.    Eyes: Negative for pain and visual disturbance.   Respiratory: Negative for cough and shortness of breath.    Cardiovascular: Negative for chest pain, palpitations and peripheral edema.   Gastrointestinal: Positive for constipation and heartburn. Negative for abdominal pain, diarrhea, hematochezia and nausea.   Breasts:  Negative for tenderness, breast mass and discharge.   Genitourinary: Negative for dysuria, frequency, genital sores, hematuria, pelvic pain, urgency, vaginal bleeding and vaginal discharge.   Musculoskeletal: Negative for arthralgias, joint swelling and myalgias.   Skin: Negative for rash.   Neurological: Positive for headaches. Negative for dizziness, weakness and paresthesias.   Psychiatric/Behavioral: Negative for mood changes. The patient is nervous/anxious.      Has been taking Allegra for year-round allergies. Was taking Claritin but it stopped working. Has had right ear crackling especially when lying on that side. When her  snores the sound reverberates off the ear drum and causes crackling and discomfort. Has chronic sinus/nasal congestion. Saw ENT a while back and was told she could have surgery but it was not exactly necessary. She has several dogs, a cat and a chicken that lives in the house. The chicken will be moving outdoors as they are going to be fostering the infant sibling of her teenage children who were foster children that they adopted. She believes she likely has allergies to some of the pets.      OBJECTIVE:   /70   Pulse 62   Temp 98.7  F (37.1  C) (Temporal)   Resp 18   Ht 1.702 m (5'  "7.01\")   Wt 80.3 kg (177 lb)   LMP 03/18/2021   SpO2 97%   BMI 27.72 kg/m    Physical Exam  GENERAL: healthy, alert and no distress  EYES: Eyes grossly normal to inspection, PERRL and conjunctivae and sclerae normal  HENT: right TM with mild clear effusion, left TM normal, normal ear canals, nose and mouth without ulcers or lesions  NECK: no adenopathy, no asymmetry, masses, or scars and thyroid normal to palpation  RESP: lungs clear to auscultation - no rales, rhonchi or wheezes  BREAST: normal without masses, tenderness or nipple discharge and no palpable axillary masses or adenopathy  CV: regular rate and rhythm, normal S1 S2, no S3 or S4, no murmur, click or rub, no peripheral edema   ABDOMEN: soft, nontender, no hepatosplenomegaly, no masses and bowel sounds normal   (female): normal female external genitalia, normal urethral meatus, vaginal mucosa pink, moist, well rugated, and normal cervix/adnexa/uterus without masses or discharge  MS: no gross musculoskeletal defects noted, no edema  SKIN: no suspicious lesions or rashes  NEURO: Normal strength and tone, mentation intact and speech normal  PSYCH: mentation appears normal, affect normal/bright    Diagnostic Test Results:  Labs reviewed in Epic        ASSESSMENT/PLAN:   1. Routine general medical examination at a health care facility  See notes    2. Chronic maxillary sinusitis   has had crackling and discomfort of the right ear for the past 6 months that seems like it is mainly related to changes in pressure. She has chronic sinusitis and likely has allergies to the dogs/cat and/or chicken that lives in the home. Recommend trying Claritin-D and adding Astelin spray to see if this helps and seeing ENT for consult.   - OTOLARYNGOLOGY REFERRAL    3. Dysfunction of right eustachian tube  - OTOLARYNGOLOGY REFERRAL    4. Dysmenorrhea  Stable. Continue current medication(s) and dose(s).   - norethindrone-ethinyl estradiol (JUNEL FE 1/20) 1-20 MG-MCG tablet; " "Take 1 tablet by mouth daily  Dispense: 84 tablet; Refill: 3    5. Constipation, unspecified constipation type  Has been ongoing for the past several months. Recommend ruling out thyroid disorder.   - **TSH with free T4 reflex FUTURE anytime; Future    6. Seasonal allergic rhinitis due to other allergic trigger  See notes #2  - mometasone (NASONEX) 50 MCG/ACT nasal spray; Spray 2 sprays into both nostrils daily  Dispense: 17 g; Refill: 11  - azelastine (ASTELIN) 0.1 % nasal spray; Spray 2 sprays into both nostrils 2 times daily  Dispense: 30 mL; Refill: 11  - OTOLARYNGOLOGY REFERRAL    7. Encounter for screening mammogram for breast cancer  - MA SCREENING DIGITAL BILAT - Future  (s+30); Future    8. Screening for malignant neoplasm of cervix  - HPV High Risk Types DNA Cervical  - Pap imaged thin layer screen with HPV - recommended age 30 - 65 years (select HPV order below)    9. Lipid screening  - Lipid panel reflex to direct LDL Fasting; Future    10. Screening for diabetes mellitus  - **Basic metabolic panel FUTURE anytime; Future    Patient has been advised of split billing requirements and indicates understanding: Yes  COUNSELING:  Reviewed preventive health counseling, as reflected in patient instructions       Regular exercise       Healthy diet/nutrition       Immunizations    Declined: Influenza due to Concerns about side effects/safety               Contraception       (Radha)menopause management    Estimated body mass index is 27.72 kg/m  as calculated from the following:    Height as of this encounter: 1.702 m (5' 7.01\").    Weight as of this encounter: 80.3 kg (177 lb).    Weight management plan: Discussed healthy diet and exercise guidelines    She reports that she has never smoked. She has never used smokeless tobacco.      Counseling Resources:  ATP IV Guidelines  Pooled Cohorts Equation Calculator  Breast Cancer Risk Calculator  BRCA-Related Cancer Risk Assessment: FHS-7 Tool  FRAX Risk " Assessment  ICSI Preventive Guidelines  Dietary Guidelines for Americans, 2010  USDA's MyPlate  ASA Prophylaxis  Lung CA Screening    WARNER Sanchez CNP  [facility]

## 2021-04-01 LAB
COPATH REPORT: NORMAL
PAP: NORMAL

## 2021-04-06 LAB
FINAL DIAGNOSIS: NORMAL
HPV HR 12 DNA CVX QL NAA+PROBE: NEGATIVE
HPV16 DNA SPEC QL NAA+PROBE: NEGATIVE
HPV18 DNA SPEC QL NAA+PROBE: NEGATIVE
SPECIMEN DESCRIPTION: NORMAL
SPECIMEN SOURCE CVX/VAG CYTO: NORMAL

## 2021-07-27 ENCOUNTER — LAB (OUTPATIENT)
Dept: LAB | Facility: OTHER | Age: 46
End: 2021-07-27
Payer: COMMERCIAL

## 2021-07-27 DIAGNOSIS — Z13.1 SCREENING FOR DIABETES MELLITUS: ICD-10-CM

## 2021-07-27 DIAGNOSIS — K59.00 CONSTIPATION, UNSPECIFIED CONSTIPATION TYPE: ICD-10-CM

## 2021-07-27 DIAGNOSIS — Z13.220 LIPID SCREENING: ICD-10-CM

## 2021-07-27 LAB
ANION GAP SERPL CALCULATED.3IONS-SCNC: 5 MMOL/L (ref 3–14)
BUN SERPL-MCNC: 11 MG/DL (ref 7–30)
CALCIUM SERPL-MCNC: 8.2 MG/DL (ref 8.5–10.1)
CHLORIDE BLD-SCNC: 110 MMOL/L (ref 94–109)
CHOLEST SERPL-MCNC: 180 MG/DL
CO2 SERPL-SCNC: 25 MMOL/L (ref 20–32)
CREAT SERPL-MCNC: 0.98 MG/DL (ref 0.52–1.04)
FASTING STATUS PATIENT QL REPORTED: YES
GFR SERPL CREATININE-BSD FRML MDRD: 70 ML/MIN/1.73M2
GLUCOSE BLD-MCNC: 96 MG/DL (ref 70–99)
HDLC SERPL-MCNC: 75 MG/DL
LDLC SERPL CALC-MCNC: 82 MG/DL
NONHDLC SERPL-MCNC: 105 MG/DL
POTASSIUM BLD-SCNC: 3.7 MMOL/L (ref 3.4–5.3)
SODIUM SERPL-SCNC: 140 MMOL/L (ref 133–144)
TRIGL SERPL-MCNC: 117 MG/DL
TSH SERPL DL<=0.005 MIU/L-ACNC: 0.71 MU/L (ref 0.4–4)

## 2021-07-27 PROCEDURE — 36415 COLL VENOUS BLD VENIPUNCTURE: CPT

## 2021-07-27 PROCEDURE — 80061 LIPID PANEL: CPT

## 2021-07-27 PROCEDURE — 80048 BASIC METABOLIC PNL TOTAL CA: CPT

## 2021-07-27 PROCEDURE — 84443 ASSAY THYROID STIM HORMONE: CPT

## 2021-08-24 ENCOUNTER — NURSE TRIAGE (OUTPATIENT)
Dept: NURSING | Facility: CLINIC | Age: 46
End: 2021-08-24

## 2021-08-24 NOTE — TELEPHONE ENCOUNTER
Patient calls with questions regarding the covid-19 vaccine. She would like to schedule an appointment to get the vaccine but had a covid-19 exposure on 8/13/21. Exposure was with her manager at work, they were within 3 feet of each other for about an hour. Her manager developed symptoms the next day. Currently patient does not have any symptoms.    Patient is advised per protocol that it is recommended to wait to get the vaccine until the exposure quarantine period is over. Patient verbalizes understanding and denies further questions at this time.    Lizz Armendariz RN  [facility]      Reason for Disposition    COVID-19 vaccine, Frequently Asked Questions (FAQs)    Additional Information    Negative: [1] Difficulty breathing or swallowing AND [2] starts within 2 hours after injection    Negative: Sounds like a life-threatening emergency to the triager    Negative: [1] Has NOT completed COVID-19 vaccine series AND [2] COVID-19 exposure AND [2] AND [3] typical COVID-19 symptoms    Negative: [1] Has NOT completed COVID-19 vaccine series AND [2]  COVID-19 exposure AND [3] no symptoms    Negative: [1] COVID-19 vaccine series completed (fully vaccinated) in past 3 months AND [2] new-onset of COVID-19 symptoms BUT [3] no known exposure    Negative: [1] Typical COVID-19 symptoms AND [2] symptoms that are NOT expected from vaccine (e.g., cough, difficulty breathing, loss of taste or smell, runny nose, sore throat)    Negative: [1] Typical COVID-19 symptoms AND [2] started > 3 days after getting vaccine    Negative: Fever > 104 F (40 C)    Negative: Sounds like a severe, unusual reaction to the triager    Negative: [1] Redness or red streak around the injection site AND [2] started > 48 hours after getting vaccine AND [3] fever    Negative: [1] Fever > 101 F (38.3 C) AND [2] age > 60 years AND [3] started > 48 hours after getting vaccine    Negative: [1] Fever > 100.0 F (37.8 C) AND [2] bedridden  (e.g., nursing home patient, CVA, chronic illness, recovering from surgery) AND [3] started > 48 hours after getting vaccine    Negative: [1] Fever > 100.0 F (37.8 C) AND [2] diabetes mellitus or weak immune system (e.g., HIV positive, cancer chemo, splenectomy, organ transplant, chronic steroids) AND [3] started > 48 hours after getting vaccine    Negative: [1] Redness or red streak around the injection site AND [2] started > 48 hours after getting vaccine AND [3] no fever  (Exception: red area < 1 inch or 2.5 cm wide)    Negative: [1] Pain, tenderness, or swelling at the injection site AND [2] over 3 days (72 hours) since vaccine AND [3] getting worse    Negative: Fever > 100.0 F (37.8 C) present > 3 days (72 hours)    Negative: [1] Fever > 100.0 F (37.8 C) AND [2] healthcare worker    Negative: [1] Pain, tenderness, or swelling at the injection site AND [2] lasts > 7 days    Negative: [1] Lymph node swelling (i.e., armpit or neck on side of vaccine) AND [2] lasts > 3 weeks    Negative: [1] Requesting COVID-19 vaccine AND [2] healthcare worker (e.g., EMS first responders, doctors, nurses)    Negative: [1] Requesting COVID-19 vaccine AND [2] resident of a long-term care facility (e.g., nursing home)    Negative: [1] Requesting COVID-19 vaccine AND [2] vaccine available in the community for this patient group    Negative: COVID-19 vaccine, injection site reaction (e.g., pain, redness, swelling), question about    Negative: COVID-19 vaccine, systemic reactions (e.g., fatigue, fever, muscle aches), questions about    Protocols used: CORONAVIRUS (COVID-19) VACCINE QUESTIONS AND [id number] 3.25    COVID 19 Nurse Triage Plan/Patient Instructions    Please be aware that novel coronavirus (COVID-19) may be circulating in the community. If you develop symptoms such as fever, cough, or SOB or if you have concerns about the presence of another infection including coronavirus (COVID-19), please contact your health care  provider or visit [email or web address].     Disposition/Instructions    Home care recommended. Follow home care protocol based instructions.    Thank you for taking steps to prevent the spread of this virus.  o Limit your contact with others.  o Wear a simple mask to cover your cough.  o Wash your hands well and often.    Resources    [facility]: About COVID-19: [email or web address]    CDC: What to Do If You're Sick: www.cdc.gov/coronavirus/2019-ncov/about/steps-when-sick.html    CDC: Ending Home Isolation: www.cdc.gov/coronavirus/2019-ncov/hcp/disposition-in-home-patients.html     CDC: Caring for Someone: www.cdc.gov/coronavirus/2019-ncov/if-you-are-sick/care-for-someone.html     [facility]: Interim Guidance for Hospital Discharge to Home: www.health.[facility].[place].us/diseases/coronavirus/hcp/hospdischarge.pdf    [facility] clinical trials (COVID-19 research studies): [email or web address].[facility]-clinical-trials     Below are the COVID-19 hotlines at the [place] Department of [facility] ([facility]). Interpreters are available.   o For health questions: Call 052-314-5083 or 1-465.526.6729 (7 a.m. to 7 p.m.)  o For questions about schools and childcare: Call 402-388-8108 or 1-823.148.8496 (7 a.m. to 7 p.m.)

## 2021-09-07 ENCOUNTER — E-VISIT (OUTPATIENT)
Dept: URGENT CARE | Facility: URGENT CARE | Age: 46
End: 2021-09-07
Payer: COMMERCIAL

## 2021-09-07 DIAGNOSIS — Z20.822 SUSPECTED COVID-19 VIRUS INFECTION: Primary | ICD-10-CM

## 2021-09-07 PROCEDURE — 99421 OL DIG E/M SVC 5-10 MIN: CPT | Performed by: PHYSICIAN ASSISTANT

## 2021-09-07 NOTE — PATIENT INSTRUCTIONS
Dear Nithya Tolentino,    Your symptoms show that you may have coronavirus (COVID-19). This illness can cause fever, cough and trouble breathing. Many people get a mild case and get better on their own. Some people can get very sick.    Will I be tested for COVID-19?  We would like to test you for Covid-19 virus. I have placed orders for this test.     To schedule: go to your "[employer] home page and scroll down to the section that says  You have an appointment that needs to be scheduled  and click the large green button that says  Schedule Now  and follow the steps to find the next available openings.    If you are unable to complete these "[employer] scheduling steps, please call [phone] to schedule your testing.     Return to work/school/ guidance:  Please let your workplace manager and staffing office know when your quarantine ends     We can t give you an exact date as it depends on the above. You can calculate this on your own or work with your manager/staffing office to calculate this. (For example if you were exposed on 10/4, you would have to quarantine for 14 full days. That would be through 10/18. You could return on 10/19.)      If you receive a positive COVID-19 test result, follow the guidance of the those who are giving you the results. Usually the return to work is 10 (or in some cases 20 days from symptom onset.) If you work at [facility], you must also be cleared by Employee Occupational Health and Safety to return to work.        If you receive a negative COVID-19 test result and did not have a high risk exposure to someone with a known positive COVID-19 test, you can return to work once you're free of fever for 24 hours without fever-reducing medication and your symptoms are improving or resolved.      If you receive a negative COVID-19 test and If you had a high risk exposure to someone who has tested positive for COVID-19 then you can return to work 14 days after your last contact  with the positive individual    Note: If you have ongoing exposure to the covid positive person, this quarantine period may be more than 14 days. (For example, if you are continued to be exposed to your child who tested positive and cannot isolate from them, then the quarantine of 7-14 days can't start until your child is no longer contagious. This is typically 10 days from onset of the child's symptoms. So the total duration may be 17-24 days in this case.)    Sign up for [employer].   We know it's scary to hear that you might have COVID-19. We want to track your symptoms to make sure you're okay over the next 2 weeks. Please look for an email from [employer]--this is a free, online program that we'll use to keep in touch. To sign up, follow the link in the email you will receive. Learn more at [email or web address]    How can I take care of myself?    Get lots of rest. Drink extra fluids (unless a doctor has told you not to)    Take Tylenol (acetaminophen) or ibuprofen for fever or pain. If you have liver or kidney problems, ask your family doctor if it's okay to take Tylenol o ibuprofen    If you have other health problems (like cancer, heart failure, an organ transplant or severe kidney disease): Call your specialty clinic if you don't feel better in the next 2 days.    Know when to call 911. Emergency warning signs include:  o Trouble breathing or shortness of breath  o Pain or pressure in the chest that doesn't go away  o Feeling confused like you haven't felt before, or not being able to wake up  o Bluish-colored lips or face    Where can I get more information?  [facility] - About COVID-19:   [email or web address]    CDC - What to Do If You're Sick:   www.cdc.gov/coronavirus/2019-ncov/about/steps-when-sick.html    September 7, 2021  RE:  Nithya Tolentino                                                                                                                  [address]  [place] [facility] [address]      To whom it may concern:    I evaluated Nithya Tolentino on September 7, 2021. Nithya Tolentino should be excused from work/school.     They should let their workplace manager and staffing office know when their quarantine ends.    We can not give an exact date as it depends on the information below. They can calculate this on their own or work with their manager/staffing office to calculate this. (For example if they were exposed on 10/04, they would have to quarantine for 14 full days. That would be through 10/18. They could return on 10/19.)    Quarantine Guidelines:      If patient receives a positive COVID-19 test result, they should follow the guidance of those who are giving the results. Usually the return to work is 10 (or in some cases 20 days from symptom onset.) If they work at [employer], they must be cleared by Employee Occupational Health and Safety to return to work.        If patient receives a negative COVID-19 test result and did not have a high risk exposure to someone with a known positive COVID-19 test, they can return to work once they're free of fever for 24 hours without fever-reducing medication and their symptoms are improving or resolved.      If patient receives a negative COVID-19 test and if they had a high risk exposure to someone who has tested positive for COVID-19 then they can return to work 14 days after their last contact with the positive individual    Note: If there is ongoing exposure to the covid positive person, this quarantine period may be longer than 14 days. (For example, if they are continually exposed to their child, who tested positive and cannot isolate from them, then the quarantine of 7-14 days can't start until their child is no longer contagious. This is typically 10 days from onset to the child's symptoms. So the total duration may be 17-24 days in this case.)     Sincerely,  Chana Ortega PA-C

## 2021-10-02 ENCOUNTER — HEALTH MAINTENANCE LETTER (OUTPATIENT)
Age: 46
End: 2021-10-02

## 2022-01-07 ENCOUNTER — HOSPITAL ENCOUNTER (OUTPATIENT)
Dept: MAMMOGRAPHY | Facility: CLINIC | Age: 47
Discharge: HOME | End: 2022-01-07
Attending: STUDENT IN AN ORGANIZED HEALTH CARE EDUCATION/TRAINING PROGRAM | Admitting: STUDENT IN AN ORGANIZED HEALTH CARE EDUCATION/TRAINING PROGRAM
Payer: COMMERCIAL

## 2022-01-07 DIAGNOSIS — Z12.31 ENCOUNTER FOR SCREENING MAMMOGRAM FOR BREAST CANCER: ICD-10-CM

## 2022-01-07 PROCEDURE — 77067 SCR MAMMO BI INCL CAD: CPT

## 2022-01-21 ENCOUNTER — OFFICE VISIT (OUTPATIENT)
Dept: FAMILY MEDICINE | Facility: OTHER | Age: 47
End: 2022-01-21
Payer: COMMERCIAL

## 2022-01-21 VITALS
DIASTOLIC BLOOD PRESSURE: 72 MMHG | HEART RATE: 75 BPM | SYSTOLIC BLOOD PRESSURE: 128 MMHG | BODY MASS INDEX: 29.19 KG/M2 | RESPIRATION RATE: 18 BRPM | TEMPERATURE: 97.8 F | HEIGHT: 67 IN | WEIGHT: 186 LBS | OXYGEN SATURATION: 97 %

## 2022-01-21 DIAGNOSIS — G25.81 RESTLESS LEGS SYNDROME (RLS): ICD-10-CM

## 2022-01-21 DIAGNOSIS — R52 GENERALIZED BODY ACHES: Primary | ICD-10-CM

## 2022-01-21 DIAGNOSIS — R41.840 CONCENTRATION DEFICIT: ICD-10-CM

## 2022-01-21 DIAGNOSIS — R41.3 MEMORY DEFICIT: ICD-10-CM

## 2022-01-21 DIAGNOSIS — Z83.49 FAMILY HISTORY OF THYROIDITIS: ICD-10-CM

## 2022-01-21 DIAGNOSIS — Z13.29 SCREENING FOR THYROID DISORDER: ICD-10-CM

## 2022-01-21 DIAGNOSIS — Z11.59 ENCOUNTER FOR HEPATITIS C SCREENING TEST FOR LOW RISK PATIENT: ICD-10-CM

## 2022-01-21 DIAGNOSIS — Z81.8 FAMILY HISTORY OF DEMENTIA: ICD-10-CM

## 2022-01-21 PROBLEM — M20.5X2 ACQUIRED HALLUX LIMITUS OF LEFT FOOT: Status: ACTIVE | Noted: 2019-10-17

## 2022-01-21 LAB
ALBUMIN SERPL-MCNC: 3.3 G/DL (ref 3.4–5)
ALBUMIN UR-MCNC: NEGATIVE MG/DL
ALP SERPL-CCNC: 54 U/L (ref 40–150)
ALT SERPL W P-5'-P-CCNC: 22 U/L (ref 0–50)
ANION GAP SERPL CALCULATED.3IONS-SCNC: 5 MMOL/L (ref 3–14)
APPEARANCE UR: CLEAR
AST SERPL W P-5'-P-CCNC: 13 U/L (ref 0–45)
BASOPHILS # BLD AUTO: 0 10E3/UL (ref 0–0.2)
BASOPHILS NFR BLD AUTO: 0 %
BILIRUB SERPL-MCNC: 0.5 MG/DL (ref 0.2–1.3)
BILIRUB UR QL STRIP: NEGATIVE
BUN SERPL-MCNC: 12 MG/DL (ref 7–30)
CALCIUM SERPL-MCNC: 8.2 MG/DL (ref 8.5–10.1)
CHLORIDE BLD-SCNC: 108 MMOL/L (ref 94–109)
CO2 SERPL-SCNC: 26 MMOL/L (ref 20–32)
COLOR UR AUTO: YELLOW
CREAT SERPL-MCNC: 0.8 MG/DL (ref 0.52–1.04)
CRP SERPL-MCNC: 2.9 MG/L (ref 0–8)
EOSINOPHIL # BLD AUTO: 0.3 10E3/UL (ref 0–0.7)
EOSINOPHIL NFR BLD AUTO: 4 %
ERYTHROCYTE [DISTWIDTH] IN BLOOD BY AUTOMATED COUNT: 13.3 % (ref 10–15)
ERYTHROCYTE [SEDIMENTATION RATE] IN BLOOD BY WESTERGREN METHOD: 17 MM/HR (ref 0–20)
FERRITIN SERPL-MCNC: 103 NG/ML (ref 8–252)
FOLATE SERPL-MCNC: 16.3 NG/ML
GFR SERPL CREATININE-BSD FRML MDRD: >90 ML/MIN/1.73M2
GLUCOSE BLD-MCNC: 105 MG/DL (ref 70–99)
GLUCOSE UR STRIP-MCNC: NEGATIVE MG/DL
HCT VFR BLD AUTO: 43.3 % (ref 35–47)
HCV AB SERPL QL IA: NONREACTIVE
HGB BLD-MCNC: 14 G/DL (ref 11.7–15.7)
HGB UR QL STRIP: ABNORMAL
IRON SATN MFR SERPL: 28 % (ref 15–46)
IRON SERPL-MCNC: 90 UG/DL (ref 35–180)
KETONES UR STRIP-MCNC: NEGATIVE MG/DL
LEUKOCYTE ESTERASE UR QL STRIP: NEGATIVE
LYMPHOCYTES # BLD AUTO: 2.2 10E3/UL (ref 0.8–5.3)
LYMPHOCYTES NFR BLD AUTO: 31 %
MAGNESIUM SERPL-MCNC: 1.7 MG/DL (ref 1.6–2.3)
MCH RBC QN AUTO: 30.5 PG (ref 26.5–33)
MCHC RBC AUTO-ENTMCNC: 32.3 G/DL (ref 31.5–36.5)
MCV RBC AUTO: 94 FL (ref 78–100)
MONOCYTES # BLD AUTO: 0.6 10E3/UL (ref 0–1.3)
MONOCYTES NFR BLD AUTO: 8 %
NEUTROPHILS # BLD AUTO: 4.1 10E3/UL (ref 1.6–8.3)
NEUTROPHILS NFR BLD AUTO: 57 %
NITRATE UR QL: NEGATIVE
PH UR STRIP: 5.5 [PH] (ref 5–7)
PLATELET # BLD AUTO: 279 10E3/UL (ref 150–450)
POTASSIUM BLD-SCNC: 3.7 MMOL/L (ref 3.4–5.3)
PROT SERPL-MCNC: 7.2 G/DL (ref 6.8–8.8)
RBC # BLD AUTO: 4.59 10E6/UL (ref 3.8–5.2)
RBC #/AREA URNS AUTO: ABNORMAL /HPF
SODIUM SERPL-SCNC: 139 MMOL/L (ref 133–144)
SP GR UR STRIP: 1.01 (ref 1–1.03)
SQUAMOUS #/AREA URNS AUTO: ABNORMAL /LPF
TIBC SERPL-MCNC: 325 UG/DL (ref 240–430)
TSH SERPL DL<=0.005 MIU/L-ACNC: 0.43 MU/L (ref 0.4–4)
UROBILINOGEN UR STRIP-ACNC: 0.2 E.U./DL
VIT B12 SERPL-MCNC: 1336 PG/ML (ref 193–986)
WBC # BLD AUTO: 7.2 10E3/UL (ref 4–11)
WBC #/AREA URNS AUTO: ABNORMAL /HPF

## 2022-01-21 PROCEDURE — 86038 ANTINUCLEAR ANTIBODIES: CPT | Performed by: NURSE PRACTITIONER

## 2022-01-21 PROCEDURE — 86200 CCP ANTIBODY: CPT | Performed by: NURSE PRACTITIONER

## 2022-01-21 PROCEDURE — 82607 VITAMIN B-12: CPT | Performed by: NURSE PRACTITIONER

## 2022-01-21 PROCEDURE — 86376 MICROSOMAL ANTIBODY EACH: CPT | Performed by: NURSE PRACTITIONER

## 2022-01-21 PROCEDURE — 80050 GENERAL HEALTH PANEL: CPT | Performed by: NURSE PRACTITIONER

## 2022-01-21 PROCEDURE — 86803 HEPATITIS C AB TEST: CPT | Performed by: NURSE PRACTITIONER

## 2022-01-21 PROCEDURE — 86140 C-REACTIVE PROTEIN: CPT | Performed by: NURSE PRACTITIONER

## 2022-01-21 PROCEDURE — 83735 ASSAY OF MAGNESIUM: CPT | Performed by: NURSE PRACTITIONER

## 2022-01-21 PROCEDURE — 85652 RBC SED RATE AUTOMATED: CPT | Performed by: NURSE PRACTITIONER

## 2022-01-21 PROCEDURE — 81001 URINALYSIS AUTO W/SCOPE: CPT | Performed by: NURSE PRACTITIONER

## 2022-01-21 PROCEDURE — 36415 COLL VENOUS BLD VENIPUNCTURE: CPT | Performed by: NURSE PRACTITIONER

## 2022-01-21 PROCEDURE — 99214 OFFICE O/P EST MOD 30 MIN: CPT | Performed by: NURSE PRACTITIONER

## 2022-01-21 PROCEDURE — 86431 RHEUMATOID FACTOR QUANT: CPT | Performed by: NURSE PRACTITIONER

## 2022-01-21 PROCEDURE — 82728 ASSAY OF FERRITIN: CPT | Performed by: NURSE PRACTITIONER

## 2022-01-21 PROCEDURE — 82746 ASSAY OF FOLIC ACID SERUM: CPT | Performed by: NURSE PRACTITIONER

## 2022-01-21 PROCEDURE — 83550 IRON BINDING TEST: CPT | Performed by: NURSE PRACTITIONER

## 2022-01-21 RX ORDER — CYCLOSPORINE 0.5 MG/ML
EMULSION OPHTHALMIC
COMMUNITY
Start: 2021-12-28 | End: 2022-10-03

## 2022-01-21 ASSESSMENT — MIFFLIN-ST. JEOR: SCORE: 1516.44

## 2022-01-21 ASSESSMENT — PAIN SCALES - GENERAL: PAINLEVEL: MODERATE PAIN (5)

## 2022-01-21 NOTE — PATIENT INSTRUCTIONS
- Labs today  - Discuss labs at follow up next week, I will reach out to you sooner if needed.         WARNER Pro CNP  Questions or concerns please feel free to send me a [employer] message or call me  Phone : [phone]

## 2022-01-21 NOTE — PROGRESS NOTES
Ne  Assessment & Plan     Generalized body aches  - Patient here with concerns for persistent generalized body aches as noted below.   - Recommend work up including hematological, recommend scheduling neurological visit if lab work up negative. I also recommend a cognitive exam by OT as well and will place this if labs are normal. I am suspicious of a thyroid disorder given her strong family although autoimmune and neurological disorders remain in the differential.   - Recommend follow up next week to discuss labs and continued plan.   - Anti Nuclear Nerissa IgG by IFA with Reflex; Future  - Rheumatoid factor; Future  - Cyclic Citrullinated Peptide Antibody IgG; Future  - ESR: Erythrocyte sedimentation rate; Future  - CRP, inflammation; Future  - Thyroid peroxidase antibody; Future  - Magnesium; Future  - Comprehensive metabolic panel (BMP + Alb, Alk Phos, ALT, AST, Total. Bili, TP); Future  - Iron and iron binding capacity; Future  - Ferritin; Future  - Folate; Future  - Vitamin B12; Future  - CBC with platelets and differential; Future  - UA Macro with Reflex to Micro and Culture - lab collect; Future  - Anti Nuclear Nerissa IgG by IFA with Reflex  - Rheumatoid factor  - Cyclic Citrullinated Peptide Antibody IgG  - ESR: Erythrocyte sedimentation rate  - CRP, inflammation  - Thyroid peroxidase antibody  - Magnesium  - Comprehensive metabolic panel (BMP + Alb, Alk Phos, ALT, AST, Total. Bili, TP)  - Iron and iron binding capacity  - Ferritin  - Folate  - Vitamin B12  - CBC with platelets and differential  - UA Macro with Reflex to Micro and Culture - lab collect  - Adult Neurology  Referral; Future    Family history of thyroiditis  - Strong family history of thyroid disorder     Concentration deficit  - Mother had dementia   - Patient notes problem with both memory and focus. 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 Feels that she has dealt with it well up to now.   - Adult  Neurology  Referral; Future    Screening for thyroid disorder    - TSH with free T4 reflex; Future  - TSH with free T4 reflex    Restless legs syndrome (RLS)  - Check Vitamin B12 she does take oral medication  - Could consider a sleep study as well     Encounter for hepatitis C screening test for low risk patient    - Hepatitis C Screen Reflex to HCV RNA Quant and Genotype; Future  - Hepatitis C Screen Reflex to HCV RNA Quant and Genotype    Family history of dementia    - Adult Neurology  Referral; Future    Memory deficit  As noted above and in HPI   - Adult Neurology  Referral; Future      The patient indicates understanding of these issues and agrees with the plan.    Patient Instructions   - Labs today  - Discuss labs at follow up next week, I will reach out to you sooner if needed.         WARNER Pro CNP  Questions or concerns please feel free to send me a [employer] message or call me  Phone : [phone]          Return in about 2 weeks (around 2/4/2022) for recheck symptoms.    WARNER Pro CNP  [facility] MELANI Stokes is a 46 year old who presents for the following health issues     History of Present Illness       She eats 2-3 servings of fruits and vegetables daily.She consumes 1 sweetened beverage(s) daily.She exercises with enough effort to increase her heart rate 9 or less minutes per day.  She exercises with enough effort to increase her heart rate 3 or less days per week.   She is taking medications regularly.       9:15 start time.     Body aches and pains. - ongoing and gradually since the past year.    Low back pain since 28 yrs old.   Feels something is going on with whole body.    History of low back pain, takes care of this with walking and exercising. Chiropractor. Restless legs. The past 3 years she has  Noticed that the restlessness has started to go other places in her body. Over the past year or so. Her body is  "constantly aching. No amount of stretching or exercising makes it better. She notes it is worse in the evenings. Wakes her up. She has had to increase her naproxen, which helps with the restless leg, does not touch this. 1,000mg of Tylenol and 220mg Naproxen some nights. Been having odd pains all over her body \" sticking a needle pain\" Hips and muscle tightness and painful. Affecting her job. 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 Her  thinks her memory is off, noticing some jumbling of words. Word finding. She can picture the item and can't think of the name. Mother has early onset of demansia at 66.     Family history- Cousin has autoimmune disorder ( Celiacs, Thyroid, one other one not sure name).   Mother has thyroid issues. Mom and aunts have thyroid.   No family history of MS  Grandpa had parkinson's    No history of blood disorders for herself or family     Mental- Mother is anxious, father had anxiety. Anxiety runs in the family. Has several of her cousins have children with ADHD and autism.     History tick bites. Tested for lymes and did not come out as lymes.     Was tested for gluten intolerance this was negative.   No history of vitamin D deficiency   Takes a vitamin D deficiency     No new medication or supplements  Pain just feels \"different\"    Weight gain- about 10lbs   Menstrual cycle was irregular and painful, on birth control. Has been offered an ablation for this.    Troubles with concentration   Has to be careful with how she walks and does things even steps.     Review of Systems         Objective    /72   Pulse 75   Temp 97.8  F (36.6  C) (Temporal)   Resp 18   Ht 1.702 m (5' 7.01\")   Wt 84.4 kg (186 lb)   LMP 01/21/2022   SpO2 97%   BMI 29.12 kg/m    Body mass index is 29.12 kg/m .  Physical Exam  HENT:      Right Ear: Tympanic membrane, ear canal and external ear normal.      Left Ear: Tympanic membrane, ear canal and external ear normal.      Mouth/Throat:      " Mouth: Mucous membranes are moist.   Eyes:      Extraocular Movements: Extraocular movements intact.      Conjunctiva/sclera: Conjunctivae normal.      Pupils: Pupils are equal, round, and reactive to light.   Cardiovascular:      Rate and Rhythm: Normal rate and regular rhythm.   Pulmonary:      Effort: Pulmonary effort is normal.      Breath sounds: Normal breath sounds.   Abdominal:      General: Abdomen is flat.      Palpations: Abdomen is soft.   Musculoskeletal:      Cervical back: Normal range of motion.      Comments: Low back pain and pain with bilateral leg raise      Neurological:      General: No focal deficit present.      Mental Status: She is alert and oriented to person, place, and time.      Cranial Nerves: No cranial nerve deficit.      Sensory: No sensory deficit.      Motor: No weakness.      Coordination: Coordination is intact.      Deep Tendon Reflexes:      Reflex Scores:       Patellar reflexes are 2+ on the right side and 2+ on the left side.  Psychiatric:         Mood and Affect: Mood normal.         Behavior: Behavior normal.         Thought Content: Thought content normal.         Judgment: Judgment normal.            Results for orders placed or performed in visit on 01/21/22   CBC with platelets and differential     Status: None   Result Value Ref Range    WBC Count 7.2 4.0 - 11.0 10e3/uL    RBC Count 4.59 3.80 - 5.20 10e6/uL    Hemoglobin 14.0 11.7 - 15.7 g/dL    Hematocrit 43.3 35.0 - 47.0 %    MCV 94 78 - 100 fL    MCH 30.5 26.5 - 33.0 pg    MCHC 32.3 31.5 - 36.5 g/dL    RDW 13.3 10.0 - 15.0 %    Platelet Count 279 150 - 450 10e3/uL    % Neutrophils 57 %    % Lymphocytes 31 %    % Monocytes 8 %    % Eosinophils 4 %    % Basophils 0 %    Absolute Neutrophils 4.1 1.6 - 8.3 10e3/uL    Absolute Lymphocytes 2.2 0.8 - 5.3 10e3/uL    Absolute Monocytes 0.6 0.0 - 1.3 10e3/uL    Absolute Eosinophils 0.3 0.0 - 0.7 10e3/uL    Absolute Basophils 0.0 0.0 - 0.2 10e3/uL   CBC with platelets and  differential     Status: None    Narrative    The following orders were created for panel order CBC with platelets and differential.  Procedure                               Abnormality         Status                     ---------                               -----------         ------                     CBC with platelets and d...[[id number]]                      Final result                 Please view results for these tests on the individual orders.

## 2022-01-24 LAB
CCP AB SER IA-ACNC: 2.9 U/ML
RHEUMATOID FACT SER NEPH-ACNC: <7 IU/ML
THYROPEROXIDASE AB SERPL-ACNC: 35 IU/ML

## 2022-01-25 LAB — ANA SER QL IF: NEGATIVE

## 2022-01-28 ENCOUNTER — VIRTUAL VISIT (OUTPATIENT)
Dept: FAMILY MEDICINE | Facility: OTHER | Age: 47
End: 2022-01-28
Payer: COMMERCIAL

## 2022-01-28 DIAGNOSIS — R76.8 ANTI-TPO ANTIBODIES PRESENT: Primary | ICD-10-CM

## 2022-01-28 PROCEDURE — 99213 OFFICE O/P EST LOW 20 MIN: CPT | Performed by: NURSE PRACTITIONER

## 2022-01-28 ASSESSMENT — PAIN SCALES - GENERAL: PAINLEVEL: MODERATE PAIN (5)

## 2022-01-28 NOTE — PROGRESS NOTES
Divya is a 46 year old who is being evaluated via a billable telephone visit.      What phone number would you like to be contacted at? [phone]   How would you like to obtain your AVS? Lianet    Assessment & Plan     Anti-TPO antibodies present  We discussed her results and her thyroid is on the low side of normal. Given her positive antibodies, symptoms and family history. I recommend a second opinion from endocrinology for work up. Patient agrees with plan.   - Adult Endocrinology  Referral; Future      The patient indicates understanding of these issues and agrees with the plan.    There are no Patient Instructions on file for this visit.    No follow-ups on file.    Dede Marcelo, WARNER CNP  [facility]   Divya is a 46 year old who presents for the following health issues     History of Present Illness       She eats 2-3 servings of fruits and vegetables daily.She consumes 1 sweetened beverage(s) daily.She exercises with enough effort to increase her heart rate 9 or less minutes per day.  She exercises with enough effort to increase her heart rate 3 or less days per week.   She is taking medications regularly.       Patient would like to discuss lab results with provider.      Review of Systems         Objective    Vitals - Patient Reported  Pain Score: Moderate Pain (5)  Pain Loc: Low Back      Vitals:  No vitals were obtained today due to virtual visit.    Physical Exam   healthy, alert and no distress  PSYCH: Alert and oriented times 3; coherent speech, normal   rate and volume, able to articulate logical thoughts, able   to abstract reason, no tangential thoughts, no hallucinations   or delusions  Her affect is normal  RESP: No cough, no audible wheezing, able to talk in full sentences  Remainder of exam unable to be completed due to telephone visits    Office Visit on 01/21/2022   Component Date Value Ref Range Status     TSH 01/21/2022 0.43  0.40 - 4.00  mU/L Final     CHET interpretation 01/21/2022 Negative  Negative Final      Negative:              <1:40  Borderline Positive:   1:40 - 1:80  Positive:              >1:80     Rheumatoid Factor 01/21/2022 <7  <12 IU/mL Final     Cyclic Citrullinated Peptide Antib* 01/21/2022 2.9  <7.0 U/mL Final    Negative     Erythrocyte Sedimentation Rate 01/21/2022 17  0 - 20 mm/hr Final     CRP Inflammation 01/21/2022 2.9  0.0 - 8.0 mg/L Final     Thyroid Peroxidase Antibody 01/21/2022 35* <35 IU/mL Final     Magnesium 01/21/2022 1.7  1.6 - 2.3 mg/dL Final     Sodium 01/21/2022 139  133 - 144 mmol/L Final     Potassium 01/21/2022 3.7  3.4 - 5.3 mmol/L Final     Chloride 01/21/2022 108  94 - 109 mmol/L Final     Carbon Dioxide (CO2) 01/21/2022 26  20 - 32 mmol/L Final     Anion Gap 01/21/2022 5  3 - 14 mmol/L Final     Urea Nitrogen 01/21/2022 12  7 - 30 mg/dL Final     Creatinine 01/21/2022 0.80  0.52 - 1.04 mg/dL Final     Calcium 01/21/2022 8.2* 8.5 - 10.1 mg/dL Final     Glucose 01/21/2022 105* 70 - 99 mg/dL Final     Alkaline Phosphatase 01/21/2022 54  40 - 150 U/L Final     AST 01/21/2022 13  0 - 45 U/L Final     ALT 01/21/2022 22  0 - 50 U/L Final     Protein Total 01/21/2022 7.2  6.8 - 8.8 g/dL Final     Albumin 01/21/2022 3.3* 3.4 - 5.0 g/dL Final     Bilirubin Total 01/21/2022 0.5  0.2 - 1.3 mg/dL Final     GFR Estimate 01/21/2022 >90  >60 mL/min/1.73m2 Final    Effective December 21, 2021 eGFRcr in adults is calculated using the 2021 CKD-EPI creatinine equation which includes age and gender (Ion et al., NEJM, DOI: 10.1056/JBGTtu1714633)     Iron 01/21/2022 90  35 - 180 ug/dL Final     Iron Binding Capacity 01/21/2022 325  240 - 430 ug/dL Final     Iron Sat Index 01/21/2022 28  15 - 46 % Final     Ferritin 01/21/2022 103  8 - 252 ng/mL Final     Folic Acid 01/21/2022 16.3  >=5.4 ng/mL Final    Deficient: <3.4 ng/mL  Indeterminate: 3.4-5.4 ng/mL  Normal: > 5.4 ng/mL     Vitamin B12 01/21/2022 1,336* 193 - 986 pg/mL  Final     Color Urine 01/21/2022 Yellow  Colorless, Straw, Light Yellow, Yellow Final     Appearance Urine 01/21/2022 Clear  Clear Final     Glucose Urine 01/21/2022 Negative  Negative mg/dL Final     Bilirubin Urine 01/21/2022 Negative  Negative Final     Ketones Urine 01/21/2022 Negative  Negative mg/dL Final     Specific Gravity Urine 01/21/2022 1.015  1.003 - 1.035 Final     Blood Urine 01/21/2022 Moderate* Negative Final     pH Urine 01/21/2022 5.5  5.0 - 7.0 Final     Protein Albumin Urine 01/21/2022 Negative  Negative mg/dL Final     Urobilinogen Urine 01/21/2022 0.2  0.2, 1.0 E.U./dL Final     Nitrite Urine 01/21/2022 Negative  Negative Final     Leukocyte Esterase Urine 01/21/2022 Negative  Negative Final     Hepatitis C Antibody 01/21/2022 Nonreactive  Nonreactive Final     WBC Count 01/21/2022 7.2  4.0 - 11.0 10e3/uL Final     RBC Count 01/21/2022 4.59  3.80 - 5.20 10e6/uL Final     Hemoglobin 01/21/2022 14.0  11.7 - 15.7 g/dL Final     Hematocrit 01/21/2022 43.3  35.0 - 47.0 % Final     MCV 01/21/2022 94  78 - 100 fL Final     MCH 01/21/2022 30.5  26.5 - 33.0 pg Final     MCHC 01/21/2022 32.3  31.5 - 36.5 g/dL Final     RDW 01/21/2022 13.3  10.0 - 15.0 % Final     Platelet Count 01/21/2022 279  150 - 450 10e3/uL Final     % Neutrophils 01/21/2022 57  % Final     % Lymphocytes 01/21/2022 31  % Final     % Monocytes 01/21/2022 8  % Final     % Eosinophils 01/21/2022 4  % Final     % Basophils 01/21/2022 0  % Final     Absolute Neutrophils 01/21/2022 4.1  1.6 - 8.3 10e3/uL Final     Absolute Lymphocytes 01/21/2022 2.2  0.8 - 5.3 10e3/uL Final     Absolute Monocytes 01/21/2022 0.6  0.0 - 1.3 10e3/uL Final     Absolute Eosinophils 01/21/2022 0.3  0.0 - 0.7 10e3/uL Final     Absolute Basophils 01/21/2022 0.0  0.0 - 0.2 10e3/uL Final     RBC Urine 01/21/2022 0-2  0-2 /HPF /HPF Final     WBC Urine 01/21/2022 0-5  0-5 /HPF /HPF Final     Squamous Epithelials Urine 01/21/2022 Few* None Seen /LPF Final                Phone call duration: 5 minutes

## 2022-02-25 DIAGNOSIS — N94.6 DYSMENORRHEA: ICD-10-CM

## 2022-02-26 DIAGNOSIS — J30.89 SEASONAL ALLERGIC RHINITIS DUE TO OTHER ALLERGIC TRIGGER: ICD-10-CM

## 2022-02-28 RX ORDER — NORETHINDRONE ACETATE AND ETHINYL ESTRADIOL AND FERROUS FUMARATE 1MG-20(21)
KIT ORAL
Qty: 84 TABLET | Refills: 1 | Status: SHIPPED | OUTPATIENT
Start: 2022-02-28 | End: 2022-08-23

## 2022-03-01 RX ORDER — AZELASTINE 1 MG/ML
SPRAY, METERED NASAL
Qty: 30 ML | Refills: 1 | Status: SHIPPED | OUTPATIENT
Start: 2022-03-01 | End: 2022-06-01

## 2022-03-01 NOTE — TELEPHONE ENCOUNTER
Astelin  Prescription approved per [facility] Refill Protocol.    Doris Mock RN

## 2022-03-17 NOTE — TELEPHONE ENCOUNTER
RECORDS RECEIVED FROM: Internal   REASON FOR VISIT: Generalized body aches, Concentration deficit, Family history of dementia   Memory deficit   Date of Appt: 06/08/2022   NOTES (FOR ALL VISITS) STATUS DETAILS   OFFICE NOTE from referring provider Internal 01/21/2022 Dr Marcelo [facility]    OFFICE NOTE from other specialist N/A    DISCHARGE SUMMARY from hospital N/A    DISCHARGE REPORT from the ER N/A    OPERATIVE REPORT N/A    MEDICATION LIST N/A    IMAGING  (FOR ALL VISITS)     EMG N/A    EEG N/A    LUMBAR PUNCTURE N/A    BENNETT SCAN N/A    ULTRASOUND (CAROTID BILAT) *VASCULAR* N/A    MRI (HEAD, NECK, SPINE) N/A    CT (HEAD, NECK, SPINE) N/A

## 2022-03-20 ENCOUNTER — HOSPITAL ENCOUNTER (EMERGENCY)
Facility: CLINIC | Age: 47
Discharge: HOME | End: 2022-03-20
Attending: FAMILY MEDICINE | Admitting: FAMILY MEDICINE
Payer: COMMERCIAL

## 2022-03-20 ENCOUNTER — APPOINTMENT (OUTPATIENT)
Dept: ULTRASOUND IMAGING | Facility: CLINIC | Age: 47
End: 2022-03-20
Attending: FAMILY MEDICINE
Payer: COMMERCIAL

## 2022-03-20 VITALS
TEMPERATURE: 97.5 F | OXYGEN SATURATION: 96 % | DIASTOLIC BLOOD PRESSURE: 79 MMHG | RESPIRATION RATE: 18 BRPM | SYSTOLIC BLOOD PRESSURE: 133 MMHG | HEART RATE: 69 BPM

## 2022-03-20 DIAGNOSIS — R74.8 ABNORMAL LIVER ENZYMES: ICD-10-CM

## 2022-03-20 DIAGNOSIS — L29.9 PRURITIC DISORDER: ICD-10-CM

## 2022-03-20 DIAGNOSIS — D72.820 ATYPICAL LYMPHOCYTOSIS: ICD-10-CM

## 2022-03-20 LAB
ALBUMIN SERPL-MCNC: 3.4 G/DL (ref 3.4–5)
ALBUMIN UR-MCNC: NEGATIVE MG/DL
ALP SERPL-CCNC: 241 U/L (ref 40–150)
ALT SERPL W P-5'-P-CCNC: 359 U/L (ref 0–50)
ANION GAP SERPL CALCULATED.3IONS-SCNC: 5 MMOL/L (ref 3–14)
APPEARANCE UR: CLEAR
AST SERPL W P-5'-P-CCNC: 262 U/L (ref 0–45)
BASOPHILS # BLD MANUAL: 0 10E3/UL (ref 0–0.2)
BASOPHILS # BLD MANUAL: 0 10E3/UL (ref 0–0.2)
BASOPHILS NFR BLD MANUAL: 0 %
BASOPHILS NFR BLD MANUAL: 0 %
BILIRUB SERPL-MCNC: 1.9 MG/DL (ref 0.2–1.3)
BILIRUB UR QL STRIP: NEGATIVE
BUN SERPL-MCNC: 4 MG/DL (ref 7–30)
CALCIUM SERPL-MCNC: 8.6 MG/DL (ref 8.5–10.1)
CHLORIDE BLD-SCNC: 107 MMOL/L (ref 94–109)
CO2 SERPL-SCNC: 25 MMOL/L (ref 20–32)
COLOR UR AUTO: YELLOW
CREAT SERPL-MCNC: 0.8 MG/DL (ref 0.52–1.04)
CRP SERPL-MCNC: 8.4 MG/L (ref 0–8)
EOSINOPHIL # BLD MANUAL: 0 10E3/UL (ref 0–0.7)
EOSINOPHIL # BLD MANUAL: 0 10E3/UL (ref 0–0.7)
EOSINOPHIL NFR BLD MANUAL: 0 %
EOSINOPHIL NFR BLD MANUAL: 0 %
ERYTHROCYTE [DISTWIDTH] IN BLOOD BY AUTOMATED COUNT: 13.2 % (ref 10–15)
ERYTHROCYTE [DISTWIDTH] IN BLOOD BY AUTOMATED COUNT: 13.3 % (ref 10–15)
ERYTHROCYTE [SEDIMENTATION RATE] IN BLOOD BY WESTERGREN METHOD: 59 MM/HR (ref 0–20)
GFR SERPL CREATININE-BSD FRML MDRD: >90 ML/MIN/1.73M2
GLUCOSE BLD-MCNC: 99 MG/DL (ref 70–99)
GLUCOSE UR STRIP-MCNC: NEGATIVE MG/DL
HCT VFR BLD AUTO: 42.9 % (ref 35–47)
HCT VFR BLD AUTO: 43.9 % (ref 35–47)
HGB BLD-MCNC: 14.6 G/DL (ref 11.7–15.7)
HGB BLD-MCNC: 14.8 G/DL (ref 11.7–15.7)
HGB UR QL STRIP: NEGATIVE
KETONES UR STRIP-MCNC: NEGATIVE MG/DL
LEUKOCYTE ESTERASE UR QL STRIP: NEGATIVE
LYMPHOCYTES # BLD MANUAL: 10.5 10E3/UL (ref 0.8–5.3)
LYMPHOCYTES # BLD MANUAL: 10.5 10E3/UL (ref 0.8–5.3)
LYMPHOCYTES NFR BLD MANUAL: 79 %
LYMPHOCYTES NFR BLD MANUAL: 79 %
MCH RBC QN AUTO: 30.9 PG (ref 26.5–33)
MCH RBC QN AUTO: 31.7 PG (ref 26.5–33)
MCHC RBC AUTO-ENTMCNC: 33.7 G/DL (ref 31.5–36.5)
MCHC RBC AUTO-ENTMCNC: 34 G/DL (ref 31.5–36.5)
MCV RBC AUTO: 92 FL (ref 78–100)
MCV RBC AUTO: 93 FL (ref 78–100)
MONOCYTES # BLD MANUAL: 0.4 10E3/UL (ref 0–1.3)
MONOCYTES # BLD MANUAL: 0.4 10E3/UL (ref 0–1.3)
MONOCYTES NFR BLD MANUAL: 3 %
MONOCYTES NFR BLD MANUAL: 3 %
NEUTROPHILS # BLD MANUAL: 2.3 10E3/UL (ref 1.6–8.3)
NEUTROPHILS # BLD MANUAL: 2.3 10E3/UL (ref 1.6–8.3)
NEUTROPHILS NFR BLD MANUAL: 17 %
NEUTROPHILS NFR BLD MANUAL: 17 %
NITRATE UR QL: NEGATIVE
OTHER CELLS # BLD MANUAL: 0.1 10E3/UL
OTHER CELLS # BLD MANUAL: 0.1 10E3/UL
OTHER CELLS NFR BLD MANUAL: 1 %
OTHER CELLS NFR BLD MANUAL: 1 %
PATH REV: ABNORMAL
PH UR STRIP: 6 [PH] (ref 5–7)
PLAT MORPH BLD: ABNORMAL
PLAT MORPH BLD: ABNORMAL
PLATELET # BLD AUTO: 218 10E3/UL (ref 150–450)
PLATELET # BLD AUTO: 228 10E3/UL (ref 150–450)
POTASSIUM BLD-SCNC: 3.5 MMOL/L (ref 3.4–5.3)
PROT SERPL-MCNC: 7.5 G/DL (ref 6.8–8.8)
RBC # BLD AUTO: 4.61 10E6/UL (ref 3.8–5.2)
RBC # BLD AUTO: 4.79 10E6/UL (ref 3.8–5.2)
RBC MORPH BLD: ABNORMAL
RBC MORPH BLD: ABNORMAL
RBC URINE: 0 /HPF
RETICS # AUTO: 0.06 10E6/UL (ref 0.03–0.1)
RETICS/RBC NFR AUTO: 1.2 % (ref 0.5–2)
SMUDGE CELLS BLD QL SMEAR: PRESENT
SMUDGE CELLS BLD QL SMEAR: PRESENT
SODIUM SERPL-SCNC: 137 MMOL/L (ref 133–144)
SP GR UR STRIP: 1 (ref 1–1.03)
SQUAMOUS EPITHELIAL: <1 /HPF
UROBILINOGEN UR STRIP-MCNC: NORMAL MG/DL
WBC # BLD AUTO: 13.3 10E3/UL (ref 4–11)
WBC # BLD AUTO: 13.3 10E3/UL (ref 4–11)
WBC URINE: 0 /HPF

## 2022-03-20 PROCEDURE — 81001 URINALYSIS AUTO W/SCOPE: CPT | Performed by: FAMILY MEDICINE

## 2022-03-20 PROCEDURE — 86140 C-REACTIVE PROTEIN: CPT | Performed by: FAMILY MEDICINE

## 2022-03-20 PROCEDURE — 99284 EMERGENCY DEPT VISIT MOD MDM: CPT | Mod: 25

## 2022-03-20 PROCEDURE — 96374 THER/PROPH/DIAG INJ IV PUSH: CPT

## 2022-03-20 PROCEDURE — 96375 TX/PRO/DX INJ NEW DRUG ADDON: CPT

## 2022-03-20 PROCEDURE — 86708 HEPATITIS A ANTIBODY: CPT | Performed by: FAMILY MEDICINE

## 2022-03-20 PROCEDURE — 250N000011 HC RX IP 250 OP 636: Performed by: FAMILY MEDICINE

## 2022-03-20 PROCEDURE — 85027 COMPLETE CBC AUTOMATED: CPT | Performed by: FAMILY MEDICINE

## 2022-03-20 PROCEDURE — 36415 COLL VENOUS BLD VENIPUNCTURE: CPT | Performed by: FAMILY MEDICINE

## 2022-03-20 PROCEDURE — 85652 RBC SED RATE AUTOMATED: CPT | Performed by: FAMILY MEDICINE

## 2022-03-20 PROCEDURE — 86803 HEPATITIS C AB TEST: CPT | Performed by: FAMILY MEDICINE

## 2022-03-20 PROCEDURE — 250N000009 HC RX 250: Performed by: FAMILY MEDICINE

## 2022-03-20 PROCEDURE — 96376 TX/PRO/DX INJ SAME DRUG ADON: CPT

## 2022-03-20 PROCEDURE — 99284 EMERGENCY DEPT VISIT MOD MDM: CPT | Performed by: FAMILY MEDICINE

## 2022-03-20 PROCEDURE — 76705 ECHO EXAM OF ABDOMEN: CPT

## 2022-03-20 PROCEDURE — 87340 HEPATITIS B SURFACE AG IA: CPT | Performed by: FAMILY MEDICINE

## 2022-03-20 PROCEDURE — 250N000013 HC RX MED GY IP 250 OP 250 PS 637: Performed by: FAMILY MEDICINE

## 2022-03-20 PROCEDURE — 85045 AUTOMATED RETICULOCYTE COUNT: CPT | Performed by: FAMILY MEDICINE

## 2022-03-20 PROCEDURE — 82310 ASSAY OF CALCIUM: CPT | Performed by: FAMILY MEDICINE

## 2022-03-20 RX ORDER — PREDNISONE 20 MG/1
60 TABLET ORAL DAILY
Qty: 15 TABLET | Refills: 0 | Status: SHIPPED | OUTPATIENT
Start: 2022-03-20 | End: 2022-03-28

## 2022-03-20 RX ORDER — METHYLPREDNISOLONE SODIUM SUCCINATE 125 MG/2ML
125 INJECTION, POWDER, LYOPHILIZED, FOR SOLUTION INTRAMUSCULAR; INTRAVENOUS ONCE
Status: COMPLETED | OUTPATIENT
Start: 2022-03-20 | End: 2022-03-20

## 2022-03-20 RX ORDER — HYDROXYZINE PAMOATE 25 MG/1
25-50 CAPSULE ORAL 3 TIMES DAILY PRN
Qty: 45 CAPSULE | Refills: 0 | Status: SHIPPED | OUTPATIENT
Start: 2022-03-20 | End: 2022-04-05

## 2022-03-20 RX ORDER — DIPHENHYDRAMINE HYDROCHLORIDE 50 MG/ML
25 INJECTION INTRAMUSCULAR; INTRAVENOUS ONCE
Status: COMPLETED | OUTPATIENT
Start: 2022-03-20 | End: 2022-03-20

## 2022-03-20 RX ORDER — CHOLESTYRAMINE 4 G/9G
1 POWDER, FOR SUSPENSION ORAL ONCE
Status: COMPLETED | OUTPATIENT
Start: 2022-03-20 | End: 2022-03-20

## 2022-03-20 RX ADMIN — CHOLESTYRAMINE 4 G: 4 POWDER, FOR SUSPENSION ORAL at 07:40

## 2022-03-20 RX ADMIN — DIPHENHYDRAMINE HYDROCHLORIDE 25 MG: 50 INJECTION, SOLUTION INTRAMUSCULAR; INTRAVENOUS at 07:42

## 2022-03-20 RX ADMIN — METHYLPREDNISOLONE SODIUM SUCCINATE 125 MG: 125 INJECTION, POWDER, FOR SOLUTION INTRAMUSCULAR; INTRAVENOUS at 07:44

## 2022-03-20 RX ADMIN — FAMOTIDINE 20 MG: 10 INJECTION, SOLUTION INTRAVENOUS at 07:46

## 2022-03-20 RX ADMIN — DIPHENHYDRAMINE HYDROCHLORIDE 25 MG: 50 INJECTION, SOLUTION INTRAMUSCULAR; INTRAVENOUS at 05:53

## 2022-03-20 NOTE — DISCHARGE INSTRUCTIONS
1.  I am worried that she might have a viral hepatitis as a cause of your itchiness.  I am hoping that your steroids will start to kick in within 12 to 24 hours and your itchiness should start to get better.  Please continue to use the Vistaril, 1 to 2 tablets every 6 hours as needed for the itchiness.  I have put in an urgent referral for you to follow-up with the clinic to go over your blood smear results and I have added on hepatitis testing here.

## 2022-03-20 NOTE — ED PROVIDER NOTES
Transfer of Care Note  This patient was signed out to me and I assumed care from Dr. Christina at change of shift.  Nithya Tolentino is a 46 year old female who presented to the emergency department with a chief complaint of Pruritis and Back Pain  .  Please see the original providers history and physical for complete details.    The following issues were signed out to me to follow up on:  US and dispostion      Pertant Lab/Imaging Findings During this Visit was     Results for orders placed or performed during the hospital encounter of 03/20/22 (from the past 24 hour(s))   Lab Blood Morphology Pathologist Review    Narrative    The following orders were created for panel order Lab Blood Morphology Pathologist Review.  Procedure                               Abnormality         Status                     ---------                               -----------         ------                     Bld morphology pathology...[[id number]]                      In process                 CBC with platelets and d...[[id number]]  Abnormal            Final result               Manual Differential[[id number]]          Abnormal            Final result               Reticulocyte count[[id number]]           Normal              Final result               Morphology Tracking[[id number]]                              Final result                 Please view results for these tests on the individual orders.   CBC with platelets and differential   Result Value Ref Range    WBC Count 13.3 (H) 4.0 - 11.0 10e3/uL    RBC Count 4.61 3.80 - 5.20 10e6/uL    Hemoglobin 14.6 11.7 - 15.7 g/dL    Hematocrit 42.9 35.0 - 47.0 %    MCV 93 78 - 100 fL    MCH 31.7 26.5 - 33.0 pg    MCHC 34.0 31.5 - 36.5 g/dL    RDW 13.3 10.0 - 15.0 %    Platelet Count 228 150 - 450 10e3/uL   Reticulocyte count   Result Value Ref Range    % Reticulocyte 1.2 0.5 - 2.0 %    Absolute Reticulocyte 0.056 0.025 - 0.095 10e6/uL   Manual Differential   Result Value Ref Range    % Neutrophils  17 %    % Lymphocytes 79 %    % Monocytes 3 %    % Eosinophils 0 %    % Basophils 0 %    % Other Cells 1 %    Absolute Neutrophils 2.3 1.6 - 8.3 10e3/uL    Absolute Lymphocytes 10.5 (H) 0.8 - 5.3 10e3/uL    Absolute Monocytes 0.4 0.0 - 1.3 10e3/uL    Absolute Eosinophils 0.0 0.0 - 0.7 10e3/uL    Absolute Basophils 0.0 0.0 - 0.2 10e3/uL    Absolute Other Cells 0.1 (H) <=0.0 10e3/uL    RBC Morphology Confirmed RBC Indices     Platelet Assessment  Automated Count Confirmed. Platelet morphology is normal.     Automated Count Confirmed. Platelet morphology is normal.    Smudge Cells Present (A) None Seen    Pathologist Review Comments     CBC with platelets differential    Narrative    The following orders were created for panel order CBC with platelets differential.  Procedure                               Abnormality         Status                     ---------                               -----------         ------                     CBC with platelets and d...[[id number]]  Abnormal            Final result               Manual Differential[[id number]]          Abnormal            Final result                 Please view results for these tests on the individual orders.   Comprehensive metabolic panel   Result Value Ref Range    Sodium 137 133 - 144 mmol/L    Potassium 3.5 3.4 - 5.3 mmol/L    Chloride 107 94 - 109 mmol/L    Carbon Dioxide (CO2) 25 20 - 32 mmol/L    Anion Gap 5 3 - 14 mmol/L    Urea Nitrogen 4 (L) 7 - 30 mg/dL    Creatinine 0.80 0.52 - 1.04 mg/dL    Calcium 8.6 8.5 - 10.1 mg/dL    Glucose 99 70 - 99 mg/dL    Alkaline Phosphatase 241 (H) 40 - 150 U/L     (H) 0 - 45 U/L     (H) 0 - 50 U/L    Protein Total 7.5 6.8 - 8.8 g/dL    Albumin 3.4 3.4 - 5.0 g/dL    Bilirubin Total 1.9 (H) 0.2 - 1.3 mg/dL    GFR Estimate >90 >60 mL/min/1.73m2   CRP inflammation   Result Value Ref Range    CRP Inflammation 8.4 (H) 0.0 - 8.0 mg/L   Erythrocyte sedimentation rate auto   Result Value Ref Range     Erythrocyte Sedimentation Rate 59 (H) 0 - 20 mm/hr   CBC with platelets and differential   Result Value Ref Range    WBC Count 13.3 (H) 4.0 - 11.0 10e3/uL    RBC Count 4.79 3.80 - 5.20 10e6/uL    Hemoglobin 14.8 11.7 - 15.7 g/dL    Hematocrit 43.9 35.0 - 47.0 %    MCV 92 78 - 100 fL    MCH 30.9 26.5 - 33.0 pg    MCHC 33.7 31.5 - 36.5 g/dL    RDW 13.2 10.0 - 15.0 %    Platelet Count 218 150 - 450 10e3/uL   Manual Differential   Result Value Ref Range    % Neutrophils 17 %    % Lymphocytes 79 %    % Monocytes 3 %    % Eosinophils 0 %    % Basophils 0 %    % Other Cells 1 %    Absolute Neutrophils 2.3 1.6 - 8.3 10e3/uL    Absolute Lymphocytes 10.5 (H) 0.8 - 5.3 10e3/uL    Absolute Monocytes 0.4 0.0 - 1.3 10e3/uL    Absolute Eosinophils 0.0 0.0 - 0.7 10e3/uL    Absolute Basophils 0.0 0.0 - 0.2 10e3/uL    Absolute Other Cells 0.1 (H) <=0.0 10e3/uL    RBC Morphology Confirmed RBC Indices     Platelet Assessment  Automated Count Confirmed. Platelet morphology is normal.     Automated Count Confirmed. Platelet morphology is normal.    Smudge Cells Present (A) None Seen   UA with Microscopic reflex to Culture    Specimen: Urine, Midstream   Result Value Ref Range    Color Urine Yellow Colorless, Straw, Light Yellow, Yellow    Appearance Urine Clear Clear    Glucose Urine Negative Negative mg/dL    Bilirubin Urine Negative Negative    Ketones Urine Negative Negative mg/dL    Specific Gravity Urine 1.000 (L) 1.003 - 1.035    Blood Urine Negative Negative    pH Urine 6.0 5.0 - 7.0    Protein Albumin Urine Negative Negative mg/dL    Urobilinogen Urine Normal Normal, 2.0 mg/dL    Nitrite Urine Negative Negative    Leukocyte Esterase Urine Negative Negative    RBC Urine 0 <=2 /HPF    WBC Urine 0 <=5 /HPF    Squamous Epithelials Urine <1 <=1 /HPF    Narrative    Urine Culture not indicated   US Abdomen Limited    Narrative    EXAM: ULTRASOUND ABDOMEN LIMITED  LOCATION: [facility]  DATE/TIME:  03/20/2022, 7:42 AM    INDICATION: Abnormal liver enzymes. Diffuse pruritus. Evaluate for cholestatic liver disease.  COMPARISON: None.  TECHNIQUE: Limited abdominal ultrasound.    FINDINGS:    GALLBLADDER: Normal. No gallstones, wall thickening, or pericholecystic fluid. Negative sonographic Hartley's sign.    BILE DUCTS: No biliary dilatation. The common duct measures 3 mm. Portions of the common duct could not be visualized due to overlying bowel gas.    LIVER: Normal parenchyma with smooth contour. No focal mass.    RIGHT KIDNEY: No hydronephrosis.    PANCREAS: The visualized portions are normal.    No ascites.      Impression    IMPRESSION:  Unremarkable right upper quadrant ultrasound. No biliary dilatation.           My focused follow up physical exam shows:   Vitals:  B/P: 135/81, T: 97.5, P: 68, R: 18  Gen:  Pt appears stable and no apparent distress      ED Course & Medical Decision Making:  Ultrasound was unremarkable.  Reviewing her labs, I am worried that this itchiness could be more from a viral hepatitis panel.  That could explain why her lymphocytes were abnormal.  We do have a blood smear pending.  I will add on hepatitis testing.  I have recommended that she follow-up with her doctor in the next 1 to 2 days and I have put in an urgent referral for her for this.  Hopefully by then the steroids that have given her here and I will send her home on 60 mg of prednisone, with kicked in by then.  I have also given a prescription for Vistaril to use 25 to 50 mg every 6 hours as needed.  Patient will be discharged at this time.         Impression and Disposition:  Pruritus           This note was completed in part using Dragon voice recognition, and may contain word and grammatical errors.        Cliff Junior MD  03/20/22 0863

## 2022-03-20 NOTE — ED PROVIDER NOTES
[facility] ED Provider Note   Patient: Nithya Tolentino  MRN #:  [id number]  Date of Visit: March 20, 2022    CC:     Chief Complaint   Patient presents with     Pruritis     Back Pain     HPI:  Nithya Tolentino is a 46 year old female who presented to the emergency department department for evaluation of generalized pruritus.  Patient reports onset of bilateral hand pruritus that started on Thursday, persisting into Friday.  By Friday evening, her pruritus in the hands started to spread everywhere.  She has not been able to sleep for more than a couple of hours the last 2 nights.  Patient reports that she has itching from the top of her head to the bottom of her feet, and that even her anal region itches.  She does not have any associated rash.  She states that the areas of redness is from where she has been scratching.  She is not on any new medications or supplements, has had no exposure to any new foods or skin products.  She has been experiencing some diffuse body aches over the last couple of months, and had been scheduled for consultation with endocrinology and neurology in the next couple of months.  However since the itching started    Problem List:  Patient Active Problem List    Diagnosis Date Noted     Acquired hallux limitus of left foot 10/17/2019     Priority: Medium     Other chronic sinusitis 03/06/2017     Priority: Medium     Rhinosinusitis 03/06/2017     Priority: Medium     Family history of supraventricular tachycardia 06/14/2016     Priority: Medium     MATERNAL UNCLES       Restless legs syndrome (RLS) 11/01/2013     Priority: Medium     CARDIOVASCULAR SCREENING; LDL GOAL LESS THAN 160 08/24/2012     Priority: Medium     Dysmenorrhea 07/11/2012     Priority: Medium     Seasonal allergic rhinitis 11/22/2011     Priority: Medium     Premenopausal menorrhagia 12/01/2009     Priority: Medium       Past Medical History:    Diagnosis Date     Dysmenorrhea      Environmental allergies        MEDS: AUROVELA FE 1/20 1-20 MG-MCG tablet  azelastine (ASTELIN) 0.1 % nasal spray  acetaminophen (TYLENOL) 500 MG tablet  cyanocobalamin (VITAMIN  B-12) 1000 MCG tablet  fish oil-omega-3 fatty acids 1000 MG capsule  fluticasone (FLONASE) 50 MCG/ACT nasal spray  mometasone (NASONEX) 50 MCG/ACT nasal spray  naproxen (NAPROSYN) 500 MG tablet  RESTASIS 0.05 % ophthalmic emulsion        ALLERGIES:    Allergies   Allergen Reactions     Augmentin Nausea and Vomiting       Past Surgical History:   Procedure Laterality Date     NO HISTORY OF SURGERY         Social History     Tobacco Use     Smoking status: Never Smoker     Smokeless tobacco: Never Used   Vaping Use     Vaping Use: Never used   Substance Use Topics     Alcohol use: Yes     Comment: occ/  once a month      Drug use: No         Review of Systems   Except as noted in HPI, all other systems were reviewed and are negative    Physical Exam     Vitals were reviewed  Patient Vitals for the past 12 hrs:   BP Temp Temp src Pulse Resp SpO2   03/20/22 0625 135/81 -- -- 68 -- --   03/20/22 0456 (!) 142/85 97.5  F (36.4  C) Oral 68 18 96 %     GENERAL APPEARANCE: Alert and oriented x3  FACE: normal facies  EYES: Pupils are equal; sclerae nonicteric  HENT: normal external exam  NECK: Anterior neck appears to be diffusely swollen; no discrete lymph nodes  RESP: normal respiratory effort; clear breath sounds bilaterally  CV: regular rate and rhythm; no significant murmurs, gallops or rubs  ABD: soft, no tenderness; no rebound or guarding; bowel sounds are normal  MS: no gross deformities noted; normal muscle tone.  EXT: No calf tenderness or pitting edema  SKIN: no worrisome rash; no abnormal skin lesions  NEURO: no facial droop; no focal deficits, speech is normal  PSYCH: Flat affect      Available Lab/Imaging Results     Results for orders placed or performed during the hospital encounter of 03/20/22  (from the past 24 hour(s))   Lab Blood Morphology Pathologist Review    Narrative    The following orders were created for panel order Lab Blood Morphology Pathologist Review.  Procedure                               Abnormality         Status                     ---------                               -----------         ------                     Bld morphology pathology...[[id number]]                      In process                 CBC with platelets and d...[[id number]]  Abnormal            Final result               Manual Differential[[id number]]          Abnormal            Final result               Reticulocyte count[[id number]]           Normal              Final result               Morphology Tracking[[id number]]                              Final result                 Please view results for these tests on the individual orders.   CBC with platelets and differential   Result Value Ref Range    WBC Count 13.3 (H) 4.0 - 11.0 10e3/uL    RBC Count 4.61 3.80 - 5.20 10e6/uL    Hemoglobin 14.6 11.7 - 15.7 g/dL    Hematocrit 42.9 35.0 - 47.0 %    MCV 93 78 - 100 fL    MCH 31.7 26.5 - 33.0 pg    MCHC 34.0 31.5 - 36.5 g/dL    RDW 13.3 10.0 - 15.0 %    Platelet Count 228 150 - 450 10e3/uL   Reticulocyte count   Result Value Ref Range    % Reticulocyte 1.2 0.5 - 2.0 %    Absolute Reticulocyte 0.056 0.025 - 0.095 10e6/uL   Manual Differential   Result Value Ref Range    % Neutrophils 17 %    % Lymphocytes 79 %    % Monocytes 3 %    % Eosinophils 0 %    % Basophils 0 %    % Other Cells 1 %    Absolute Neutrophils 2.3 1.6 - 8.3 10e3/uL    Absolute Lymphocytes 10.5 (H) 0.8 - 5.3 10e3/uL    Absolute Monocytes 0.4 0.0 - 1.3 10e3/uL    Absolute Eosinophils 0.0 0.0 - 0.7 10e3/uL    Absolute Basophils 0.0 0.0 - 0.2 10e3/uL    Absolute Other Cells 0.1 (H) <=0.0 10e3/uL    RBC Morphology Confirmed RBC Indices     Platelet Assessment  Automated Count Confirmed. Platelet morphology is normal.     Automated Count Confirmed. Platelet  morphology is normal.    Smudge Cells Present (A) None Seen    Pathologist Review Comments     CBC with platelets differential    Narrative    The following orders were created for panel order CBC with platelets differential.  Procedure                               Abnormality         Status                     ---------                               -----------         ------                     CBC with platelets and d...[[id number]]  Abnormal            Final result               Manual Differential[[id number]]          Abnormal            Final result                 Please view results for these tests on the individual orders.   Comprehensive metabolic panel   Result Value Ref Range    Sodium 137 133 - 144 mmol/L    Potassium 3.5 3.4 - 5.3 mmol/L    Chloride 107 94 - 109 mmol/L    Carbon Dioxide (CO2) 25 20 - 32 mmol/L    Anion Gap 5 3 - 14 mmol/L    Urea Nitrogen 4 (L) 7 - 30 mg/dL    Creatinine 0.80 0.52 - 1.04 mg/dL    Calcium 8.6 8.5 - 10.1 mg/dL    Glucose 99 70 - 99 mg/dL    Alkaline Phosphatase 241 (H) 40 - 150 U/L     (H) 0 - 45 U/L     (H) 0 - 50 U/L    Protein Total 7.5 6.8 - 8.8 g/dL    Albumin 3.4 3.4 - 5.0 g/dL    Bilirubin Total 1.9 (H) 0.2 - 1.3 mg/dL    GFR Estimate >90 >60 mL/min/1.73m2   CRP inflammation   Result Value Ref Range    CRP Inflammation 8.4 (H) 0.0 - 8.0 mg/L   Erythrocyte sedimentation rate auto   Result Value Ref Range    Erythrocyte Sedimentation Rate 59 (H) 0 - 20 mm/hr   CBC with platelets and differential   Result Value Ref Range    WBC Count 13.3 (H) 4.0 - 11.0 10e3/uL    RBC Count 4.79 3.80 - 5.20 10e6/uL    Hemoglobin 14.8 11.7 - 15.7 g/dL    Hematocrit 43.9 35.0 - 47.0 %    MCV 92 78 - 100 fL    MCH 30.9 26.5 - 33.0 pg    MCHC 33.7 31.5 - 36.5 g/dL    RDW 13.2 10.0 - 15.0 %    Platelet Count 218 150 - 450 10e3/uL   Manual Differential   Result Value Ref Range    % Neutrophils 17 %    % Lymphocytes 79 %    % Monocytes 3 %    % Eosinophils 0 %    % Basophils 0  %    % Other Cells 1 %    Absolute Neutrophils 2.3 1.6 - 8.3 10e3/uL    Absolute Lymphocytes 10.5 (H) 0.8 - 5.3 10e3/uL    Absolute Monocytes 0.4 0.0 - 1.3 10e3/uL    Absolute Eosinophils 0.0 0.0 - 0.7 10e3/uL    Absolute Basophils 0.0 0.0 - 0.2 10e3/uL    Absolute Other Cells 0.1 (H) <=0.0 10e3/uL    RBC Morphology Confirmed RBC Indices     Platelet Assessment  Automated Count Confirmed. Platelet morphology is normal.     Automated Count Confirmed. Platelet morphology is normal.    Smudge Cells Present (A) None Seen   UA with Microscopic reflex to Culture    Specimen: Urine, Midstream   Result Value Ref Range    Color Urine Yellow Colorless, Straw, Light Yellow, Yellow    Appearance Urine Clear Clear    Glucose Urine Negative Negative mg/dL    Bilirubin Urine Negative Negative    Ketones Urine Negative Negative mg/dL    Specific Gravity Urine 1.000 (L) 1.003 - 1.035    Blood Urine Negative Negative    pH Urine 6.0 5.0 - 7.0    Protein Albumin Urine Negative Negative mg/dL    Urobilinogen Urine Normal Normal, 2.0 mg/dL    Nitrite Urine Negative Negative    Leukocyte Esterase Urine Negative Negative    RBC Urine 0 <=2 /HPF    WBC Urine 0 <=5 /HPF    Squamous Epithelials Urine <1 <=1 /HPF    Narrative    Urine Culture not indicated            Impression     Final diagnoses:   Pruritic disorder   Abnormal liver enzymes   Atypical lymphocytosis         ED Course & Medical Decision Making   Nithya Tolentino is a 46 year old female who presented to the emergency department with diffuse pruritus, initially starting 3-4 days ago in both hands, but now rapidly disseminating to generalized pruritus.  Patient states that she has itching from the top of her head down to the bottom of her toes.  She also has itching even in her anal area.  She has not been able to get any rest or sleep.  Patient states that she had been experiencing some diffuse body aches for a couple of months, which went away when her pruritus started.  She  is scheduled to see an endocrinologist and neurologist in the next several weeks.  She has taken Benadryl and hydroxyzine without relief.  Patient was seen shortly after arrival.  Vital signs were stable.  Temperature is 97.5, blood pressure 142/85, normal heart rate, and respiratory rate.  Skin exam revealed no abnormal findings.  She has some areas of redness but she states this is from her scratching.  She does not have any close abdominal tenderness, but does have some anterior neck fullness possibly from some diffuse lymphadenopathy.  Laboratory work-up reveals a white blood count of 13.3, hemoglobin of 14.8, there are smudge cells, and predominant lymphocytes.  A peripheral smear has been added.  Patient's ESR is elevated at 59, and CRP is 8.4.  Comprehensive metabolic panel reveals an elevated alk phos of 241, AST of 262, ALT of 359, and total bilirubin level of 1.9.  Right upper quadrant ultrasound was added to look for any cholestatic changes.  We tried a variety of different medications to help with her pruritus.  These include the following.    Medications   diphenhydrAMINE (BENADRYL) injection 25 mg (25 mg Intravenous Given 3/20/22 3869)   cholestyramine (QUESTRAN) Packet 4 g (4 g Oral Given 3/20/22 1340)   methylPREDNISolone sodium succinate (solu-MEDROL) injection 125 mg (125 mg Intravenous Given 3/20/22 6644)   diphenhydrAMINE (BENADRYL) injection 25 mg (25 mg Intravenous Given 3/20/22 8942)   famotidine (PEPCID) injection 20 mg (20 mg Intravenous Given 3/20/22 4946)        Patient was signed out to Dr. Junior at the change of shift.        Disclaimer: This note consists of words and symbols derived from keyboarding and dictation using voice recognition software.  As a result, there may be errors that have gone undetected.  Please consider this when interpreting information found in this note.       Micaela Christina MD  03/20/22 5688

## 2022-03-20 NOTE — ED TRIAGE NOTES
"Patient reports itchiness that started on Thursday night, awoken from her sleep by her hands itching. Reports now that she \"itches everywhere\". Has tried OTC remedies such as benadryl and hydrocortisone cream without relief. Also reports swollen lymph nodes and lower R back pain. Is tearful in triage.  "

## 2022-03-21 ENCOUNTER — TELEPHONE (OUTPATIENT)
Dept: FAMILY MEDICINE | Facility: OTHER | Age: 47
End: 2022-03-21
Payer: COMMERCIAL

## 2022-03-21 LAB
HAV IGG SER QL IA: NONREACTIVE
HBV SURFACE AG SERPL QL IA: NONREACTIVE
HCV AB SERPL QL IA: NONREACTIVE

## 2022-03-21 NOTE — TELEPHONE ENCOUNTER
Reason for Call:  Same Day Appointment, Requested Provider:  Davian    PCP: Dede Marcelo    Reason for visit: follow up from emergency room visit    Duration of symptoms: a couple days    Have you been treated for this in the past? Yes    Additional comments: patient states she itches from head to toe with no rash , she was told there is something wrong with her liver and they were working her up for hepatitis and leukemia. She was told to follow up with her primary in one to two days and would prefer to see Dede than go to someone who does not know her.    Can we leave a detailed message on this number? YES    Phone number patient can be reached at: Home number on file [phone] (home)    Best Time: any    Call taken on 3/21/2022 at 8:15 AM by Cierra Castillo

## 2022-03-21 NOTE — TELEPHONE ENCOUNTER
I could work her in on Thursday at 11:00, just know that it is a work in so she we will be seen in between patients, but I can see her then. Otherwise if she thinks virtual is ok we could do that on Wednesday as well.       WARNER Pro CNP  Questions or concerns please feel free to send me a [employer] message or call me  Phone : [phone]

## 2022-03-22 ENCOUNTER — TELEPHONE (OUTPATIENT)
Dept: FAMILY MEDICINE | Facility: OTHER | Age: 47
End: 2022-03-22
Payer: COMMERCIAL

## 2022-03-22 NOTE — TELEPHONE ENCOUNTER
"Patient calling to check the status of her blood tests that were being sent to pathology. RN notified her there are currently two orders still in process.     Reports her symptoms have not changed. They gave her Hydroxyzine and Prednisone, which has helped the itching to where she can sleep and function but still has itching. Patient states she wakes up itching with night sweats. Patient states the ER provider told her they were \"looking for\" signs of cancer, and she is wondering if there is anything she needs to do to prepare for the visit tomorrow 3/23/22.  RN notified her Dede is out of the office today, but would route to her as an FYI about the pending labs. She had no further questions.     Olamide Robbins RN on 3/22/2022 at 1:34 PM    "

## 2022-03-23 ENCOUNTER — VIRTUAL VISIT (OUTPATIENT)
Dept: FAMILY MEDICINE | Facility: OTHER | Age: 47
End: 2022-03-23
Payer: COMMERCIAL

## 2022-03-23 ENCOUNTER — ANCILLARY PROCEDURE (OUTPATIENT)
Dept: GENERAL RADIOLOGY | Facility: OTHER | Age: 47
End: 2022-03-23
Attending: NURSE PRACTITIONER
Payer: COMMERCIAL

## 2022-03-23 ENCOUNTER — LAB (OUTPATIENT)
Dept: LAB | Facility: OTHER | Age: 47
End: 2022-03-23
Payer: COMMERCIAL

## 2022-03-23 ENCOUNTER — OFFICE VISIT (OUTPATIENT)
Dept: FAMILY MEDICINE | Facility: CLINIC | Age: 47
End: 2022-03-23
Payer: COMMERCIAL

## 2022-03-23 ENCOUNTER — HOSPITAL ENCOUNTER (EMERGENCY)
Facility: CLINIC | Age: 47
Discharge: LEFT BEFORE DISCHARGE | End: 2022-03-23
Payer: COMMERCIAL

## 2022-03-23 VITALS
HEART RATE: 68 BPM | RESPIRATION RATE: 14 BRPM | OXYGEN SATURATION: 96 % | WEIGHT: 182.8 LBS | DIASTOLIC BLOOD PRESSURE: 67 MMHG | SYSTOLIC BLOOD PRESSURE: 136 MMHG | TEMPERATURE: 97.9 F | BODY MASS INDEX: 28.62 KG/M2

## 2022-03-23 VITALS
OXYGEN SATURATION: 96 % | DIASTOLIC BLOOD PRESSURE: 85 MMHG | HEART RATE: 70 BPM | SYSTOLIC BLOOD PRESSURE: 140 MMHG | TEMPERATURE: 97.5 F | RESPIRATION RATE: 16 BRPM

## 2022-03-23 DIAGNOSIS — M25.50 MULTIPLE JOINT PAIN: ICD-10-CM

## 2022-03-23 DIAGNOSIS — R74.8 ELEVATED LIVER ENZYMES: ICD-10-CM

## 2022-03-23 DIAGNOSIS — R61 NIGHT SWEATS: ICD-10-CM

## 2022-03-23 DIAGNOSIS — R61 NIGHT SWEATS: Primary | ICD-10-CM

## 2022-03-23 DIAGNOSIS — R53.83 FATIGUE, UNSPECIFIED TYPE: ICD-10-CM

## 2022-03-23 DIAGNOSIS — L29.9 ITCHING: ICD-10-CM

## 2022-03-23 DIAGNOSIS — R59.0 CERVICAL LYMPHADENOPATHY: ICD-10-CM

## 2022-03-23 DIAGNOSIS — Z83.79 FAMILY HISTORY OF CELIAC DISEASE: ICD-10-CM

## 2022-03-23 DIAGNOSIS — R07.0 THROAT PAIN: Primary | ICD-10-CM

## 2022-03-23 LAB
ALBUMIN SERPL-MCNC: 3.3 G/DL (ref 3.4–5)
ALP SERPL-CCNC: 181 U/L (ref 40–150)
ALT SERPL W P-5'-P-CCNC: 494 U/L (ref 0–50)
AMYLASE SERPL-CCNC: 77 U/L (ref 30–110)
ANION GAP SERPL CALCULATED.3IONS-SCNC: 4 MMOL/L (ref 3–14)
AST SERPL W P-5'-P-CCNC: 251 U/L (ref 0–45)
B BURGDOR IGG+IGM SER QL: 0.59
BASOPHILS # BLD MANUAL: 0 10E3/UL (ref 0–0.2)
BASOPHILS NFR BLD MANUAL: 0 %
BILIRUB SERPL-MCNC: 0.7 MG/DL (ref 0.2–1.3)
BUN SERPL-MCNC: 13 MG/DL (ref 7–30)
CALCIUM SERPL-MCNC: 8.3 MG/DL (ref 8.5–10.1)
CHLORIDE BLD-SCNC: 105 MMOL/L (ref 94–109)
CO2 SERPL-SCNC: 28 MMOL/L (ref 20–32)
CREAT SERPL-MCNC: 0.85 MG/DL (ref 0.52–1.04)
DEPRECATED S PYO AG THROAT QL EIA: NEGATIVE
EOSINOPHIL # BLD MANUAL: 0 10E3/UL (ref 0–0.7)
EOSINOPHIL NFR BLD MANUAL: 0 %
ERYTHROCYTE [DISTWIDTH] IN BLOOD BY AUTOMATED COUNT: 15 % (ref 10–15)
FLUAV AG SPEC QL IA: NEGATIVE
FLUBV AG SPEC QL IA: NEGATIVE
GFR SERPL CREATININE-BSD FRML MDRD: 85 ML/MIN/1.73M2
GLUCOSE BLD-MCNC: 108 MG/DL (ref 70–99)
GROUP A STREP BY PCR: NOT DETECTED
HCT VFR BLD AUTO: 37.7 % (ref 35–47)
HGB BLD-MCNC: 14 G/DL (ref 11.7–15.7)
LIPASE SERPL-CCNC: 222 U/L (ref 73–393)
LYMPHOCYTES # BLD MANUAL: 4.9 10E3/UL (ref 0.8–5.3)
LYMPHOCYTES NFR BLD MANUAL: 48 %
MCH RBC QN AUTO: 35.5 PG (ref 26.5–33)
MCHC RBC AUTO-ENTMCNC: 37.1 G/DL (ref 31.5–36.5)
MCV RBC AUTO: 96 FL (ref 78–100)
MONOCYTES # BLD MANUAL: 0.3 10E3/UL (ref 0–1.3)
MONOCYTES NFR BLD AUTO: NEGATIVE %
MONOCYTES NFR BLD MANUAL: 3 %
NEUTROPHILS # BLD MANUAL: 5 10E3/UL (ref 1.6–8.3)
NEUTROPHILS NFR BLD MANUAL: 49 %
PLAT MORPH BLD: NORMAL
PLATELET # BLD AUTO: 272 10E3/UL (ref 150–450)
POTASSIUM BLD-SCNC: 3.4 MMOL/L (ref 3.4–5.3)
PROT SERPL-MCNC: 7.5 G/DL (ref 6.8–8.8)
RBC # BLD AUTO: 3.94 10E6/UL (ref 3.8–5.2)
RBC MORPH BLD: NORMAL
SODIUM SERPL-SCNC: 137 MMOL/L (ref 133–144)
T4 FREE SERPL-MCNC: 1.39 NG/DL (ref 0.76–1.46)
TSH SERPL DL<=0.005 MIU/L-ACNC: 0.33 MU/L (ref 0.4–4)
WBC # BLD AUTO: 10.2 10E3/UL (ref 4–11)

## 2022-03-23 PROCEDURE — U0005 INFEC AGEN DETEC AMPLI PROBE: HCPCS | Performed by: PHYSICIAN ASSISTANT

## 2022-03-23 PROCEDURE — 84443 ASSAY THYROID STIM HORMONE: CPT

## 2022-03-23 PROCEDURE — 80053 COMPREHEN METABOLIC PANEL: CPT

## 2022-03-23 PROCEDURE — 87804 INFLUENZA ASSAY W/OPTIC: CPT | Performed by: PHYSICIAN ASSISTANT

## 2022-03-23 PROCEDURE — 99214 OFFICE O/P EST MOD 30 MIN: CPT | Mod: 95 | Performed by: NURSE PRACTITIONER

## 2022-03-23 PROCEDURE — 99214 OFFICE O/P EST MOD 30 MIN: CPT | Performed by: PHYSICIAN ASSISTANT

## 2022-03-23 PROCEDURE — 36415 COLL VENOUS BLD VENIPUNCTURE: CPT

## 2022-03-23 PROCEDURE — 87651 STREP A DNA AMP PROBE: CPT | Performed by: PHYSICIAN ASSISTANT

## 2022-03-23 PROCEDURE — 99000 SPECIMEN HANDLING OFFICE-LAB: CPT

## 2022-03-23 PROCEDURE — 87798 DETECT AGENT NOS DNA AMP: CPT | Mod: 90

## 2022-03-23 PROCEDURE — 83690 ASSAY OF LIPASE: CPT

## 2022-03-23 PROCEDURE — U0003 INFECTIOUS AGENT DETECTION BY NUCLEIC ACID (DNA OR RNA); SEVERE ACUTE RESPIRATORY SYNDROME CORONAVIRUS 2 (SARS-COV-2) (CORONAVIRUS DISEASE [COVID-19]), AMPLIFIED PROBE TECHNIQUE, MAKING USE OF HIGH THROUGHPUT TECHNOLOGIES AS DESCRIBED BY CMS-2020-01-R: HCPCS | Performed by: PHYSICIAN ASSISTANT

## 2022-03-23 PROCEDURE — 86481 TB AG RESPONSE T-CELL SUSP: CPT

## 2022-03-23 PROCEDURE — 86364 TISS TRNSGLTMNASE EA IG CLAS: CPT

## 2022-03-23 PROCEDURE — 84439 ASSAY OF FREE THYROXINE: CPT

## 2022-03-23 PROCEDURE — 86308 HETEROPHILE ANTIBODY SCREEN: CPT

## 2022-03-23 PROCEDURE — 85027 COMPLETE CBC AUTOMATED: CPT

## 2022-03-23 PROCEDURE — 71046 X-RAY EXAM CHEST 2 VIEWS: CPT | Performed by: RADIOLOGY

## 2022-03-23 PROCEDURE — 82150 ASSAY OF AMYLASE: CPT

## 2022-03-23 PROCEDURE — 86618 LYME DISEASE ANTIBODY: CPT

## 2022-03-23 RX ORDER — CETIRIZINE HYDROCHLORIDE 10 MG/1
10 TABLET ORAL 2 TIMES DAILY
Qty: 60 TABLET | Refills: 0 | Status: SHIPPED | OUTPATIENT
Start: 2022-03-23 | End: 2022-04-05

## 2022-03-23 ASSESSMENT — PAIN SCALES - GENERAL
PAINLEVEL: MILD PAIN (3)
PAINLEVEL: MILD PAIN (3)

## 2022-03-23 NOTE — ED TRIAGE NOTES
Pt PCP sent pt to ED with elevated liver tests.  Pt was in ED on Sunday and reports the numbers are worse today.

## 2022-03-23 NOTE — PATIENT INSTRUCTIONS
- Labs today to include repeat of CBC, CMP, Lymes testing, Mono testing at our clinic with a chest xray, we will contact you to schedule this for today.   - Schedule Covid-19, influenza and strep testing online.   - Schedule neck ultrasound by calling [phone].   - Continue Prednisone 60mg daily   - Continue Hydroxyzine as needed for itching  - Start Zyrtec and Ranitidine as prescribed twice daily.   - Follow up with me in 1 week, however, I will be in touch with you in regards to results, econsult etc.       WARNER Pro CNP  Questions or concerns please feel free to send me a [employer] message or call me  Phone : [phone]

## 2022-03-23 NOTE — PATIENT INSTRUCTIONS
Vic Stokes,    Thank you for allowing [facility] to manage your care.    I am unsure of the cause of your symptoms, but we will see what our workup shows.     If you develop worsening/changing symptoms at any time, please call 911 or go to the emergency department for evaluation.    Drink 8-10 glasses of fluid daily to stay well-hydrated.    Please allow 1-2 business days for our office to contact you in regards to your laboratory/radiological studies.  If not done so, I encourage you to login into [employer] ([email or web address] to review your results as well.     If you have any questions or concerns, please feel free to call us at [phone]    Sincerely,    Alan Caal PA-C    Did you know?      You can schedule a video visit for follow-up appointments as well as future appointments for certain conditions.  Please see the below link.     [email or web address]    If you have not already done so,  I encourage you to sign up for [employer] ([email or web address]).  This will allow you to review your results, securely communicate with a provider, and schedule virtual visits as well.

## 2022-03-23 NOTE — PROGRESS NOTES
"  Assessment & Plan   Problem List Items Addressed This Visit     None      Visit Diagnoses     Throat pain    -  Primary    Relevant Orders    Streptococcus A Rapid Screen w/Reflex to PCR - Clinic Collect (Completed)    Symptomatic; Unknown COVID-19 Virus (Coronavirus) by PCR Nose (Completed)    Influenza A/B antigen (Completed)    Group A Streptococcus PCR Throat Swab (Completed)    Elevated liver enzymes        Relevant Orders    Ehrlichia Anaplasma Sp by PCR    Babesia Species by PCR    Night sweats        Fatigue, unspecified type             Patient is here to have swabs performed as part of a broader workup for months of illness and recent elevated LFTs. COVID, flu and strep negative. I also added on other tickborne illnesses, which are pending. She has follow up with her primary. Appears well and non-toxic and I have low suspicion for systemic illness, impending airway obstruction or respiratory distress. ER if worsening.    Complete history and physical exam as below. AF with normal VS except for elevated bp, which they will monitor at home and contact us if >140/90mmHg greater than 50% of the time.    DDx and Dx discussed with and explained to the pt to their satisfaction.  All questions were answered at this time. Pt expressed understanding of and agreement with this dx, tx, and plan. No further workup warranted and standard medication warnings given. I have given the patient a list of pertinent indications for re-evaluation. Will go to the Emergency Department if symptoms worsen or new concerning symptoms arise. Patient left in no apparent distress.      BMI:   Estimated body mass index is 28.62 kg/m  as calculated from the following:    Height as of 1/21/22: 1.702 m (5' 7.01\").    Weight as of this encounter: 82.9 kg (182 lb 12.8 oz).     See Patient Instructions    Return for a recheck as needed.    ERIC Aguirre  [facility] SESAR Stokes is a 46 year old who " presents for the following health issues     HPI     Patient says she has a lot going on and was told to get Covid swab, influenza and strep tests done    Acute Illness  Aches and fatigue intermittently for 7 months with sweating at night and itching lately. Seen in ER 3 days ago and had elevated LFTs and bilirubin. No hisotry of COVID infection, though daughter and spouse had COVID one month ago.  Acute illness concerns: Glands swollen in the back of the throat for months as well.   Onset/Duration: months  Symptoms:  Fever: no  Chills/Sweats: no  Headache (location?): no  Sinus Pressure: no  Conjunctivitis:  no  Ear Pain: no  Rhinorrhea: no  Congestion: no  Sore Throat: no  Cough: no  Wheeze: no  Decreased Appetite: no  Nausea: no  Vomiting: no  Diarrhea: no  Dysuria/Freq.: no  Dysuria or Hematuria: no  Fatigue/Achiness: no  Sick/Strep Exposure: no  Therapies tried and outcome: None    Review of Systems   Constitutional, HEENT, cardiovascular, pulmonary, gi and gu systems are negative, except as otherwise noted.      Objective    /67   Pulse 68   Temp 97.9  F (36.6  C) (Tympanic)   Resp 14   Wt 82.9 kg (182 lb 12.8 oz)   LMP 03/15/2022 (Exact Date)   SpO2 96%   BMI 28.62 kg/m    Body mass index is 28.62 kg/m .  Physical Exam  Vitals and nursing note reviewed.   Constitutional:       General: She is not in acute distress.     Appearance: She is not ill-appearing or diaphoretic.   HENT:      Head: Normocephalic and atraumatic.      Nose: Nose normal.      Mouth/Throat:      Mouth: Mucous membranes are moist.      Pharynx: Oropharynx is clear.   Eyes:      Conjunctiva/sclera: Conjunctivae normal.   Cardiovascular:      Rate and Rhythm: Normal rate and regular rhythm.      Heart sounds: Normal heart sounds. No murmur heard.    No friction rub. No gallop.   Pulmonary:      Effort: Pulmonary effort is normal. No respiratory distress.      Breath sounds: Normal breath sounds. No stridor. No wheezing, rhonchi  or rales.   Abdominal:      General: Bowel sounds are normal. There is no distension.      Palpations: Abdomen is soft. There is no mass.      Tenderness: There is no abdominal tenderness. There is no guarding or rebound.      Hernia: No hernia is present.   Musculoskeletal:      Cervical back: Normal range of motion and neck supple. No rigidity.   Lymphadenopathy:      Cervical: No cervical adenopathy.   Skin:     General: Skin is warm and dry.      Coloration: Skin is pale.   Neurological:      General: No focal deficit present.      Mental Status: She is alert. Mental status is at baseline.   Psychiatric:         Mood and Affect: Mood normal.         Behavior: Behavior normal.          Results for orders placed or performed in visit on 03/23/22   Symptomatic; Unknown COVID-19 Virus (Coronavirus) by PCR Nose     Status: Normal    Specimen: Nose; Swab   Result Value Ref Range    SARS CoV2 PCR Negative Negative, Testing sent to reference lab. Results will be returned via unsolicited result    Narrative    Testing was performed using the maria guadalupe SARS-CoV-2 assay on the maria guadalupe  [id number] System. This test should be ordered for the detection of  SARS-CoV-2 in individuals who meet SARS-CoV-2 clinical and/or  epidemiological criteria. Test performance is unknown in asymptomatic  patients. This test is for in vitro diagnostic use under the FDA EUA  for laboratories certified under CLIA to perform high and/or moderate  complexity testing. This test has not been FDA cleared or approved. A  negative result does not rule out the presence of PCR inhibitors in  the specimen or target RNA in concentration below the limit of  detection for the assay. The possibility of a false negative should  be considered if the patient's recent exposure or clinical  presentation suggests COVID-19. This test was validated by the [facility]. This  laboratory is certified under the Clinical Laboratory  Improvement  Amendments of 1988 (CLIA-88) as qualified to perform high and/or  moderate complexity laboratory testing.   Streptococcus A Rapid Screen w/Reflex to PCR - Clinic Collect     Status: Normal    Specimen: Throat; Swab   Result Value Ref Range    Group A Strep antigen Negative Negative   Influenza A/B antigen     Status: Normal    Specimen: Nose; Swab   Result Value Ref Range    Influenza A antigen Negative Negative    Influenza B antigen Negative Negative    Narrative    Test results must be correlated with clinical data. If necessary, results should be confirmed by a molecular assay or viral culture.   Group A Streptococcus PCR Throat Swab     Status: Normal    Specimen: Throat; Swab   Result Value Ref Range    Group A strep by PCR Not Detected Not Detected    Narrative    The Xpert Xpress Strep A test, performed on the [employer], is a rapid, qualitative in vitro diagnostic test for the detection of Streptococcus pyogenes (Group A ß-hemolytic Streptococcus, Strep A) in throat swab specimens from patients with signs and symptoms of pharyngitis. The Xpert Xpress Strep A test can be used as an aid in the diagnosis of Group A Streptococcal pharyngitis. The assay is not intended to monitor treatment for Group A Streptococcus infections. The Xpert Xpress Strep A test utilizes an automated real-time polymerase chain reaction (PCR) to detect Streptococcus pyogenes DNA.   Results for orders placed or performed in visit on 03/23/22   XR Chest 2 Views     Status: None    Narrative    CHEST TWO VIEWS  3/23/2022 10:14 AM     HISTORY: Night sweats. Elevated liver enzymes.    COMPARISON: 6/10/2016    FINDINGS: Heart size and pulmonary vascularity are within normal  limits. The lungs are clear. No pneumothorax or pleural effusion.       Impression    IMPRESSION: No radiographic evidence of acute chest abnormality.     VERNA SCRUGGS MD         SYSTEM ID:  [id number]   Results for orders placed or  performed in visit on 03/23/22   Comprehensive metabolic panel (BMP + Alb, Alk Phos, ALT, AST, Total. Bili, TP)     Status: Abnormal   Result Value Ref Range    Sodium 137 133 - 144 mmol/L    Potassium 3.4 3.4 - 5.3 mmol/L    Chloride 105 94 - 109 mmol/L    Carbon Dioxide (CO2) 28 20 - 32 mmol/L    Anion Gap 4 3 - 14 mmol/L    Urea Nitrogen 13 7 - 30 mg/dL    Creatinine 0.85 0.52 - 1.04 mg/dL    Calcium 8.3 (L) 8.5 - 10.1 mg/dL    Glucose 108 (H) 70 - 99 mg/dL    Alkaline Phosphatase 181 (H) 40 - 150 U/L     (H) 0 - 45 U/L     (H) 0 - 50 U/L    Protein Total 7.5 6.8 - 8.8 g/dL    Albumin 3.3 (L) 3.4 - 5.0 g/dL    Bilirubin Total 0.7 0.2 - 1.3 mg/dL    GFR Estimate 85 >60 mL/min/1.73m2   Mononucleosis screen     Status: Normal   Result Value Ref Range    Mononucleosis Screen Negative Negative   Lipase     Status: Normal   Result Value Ref Range    Lipase 222 73 - 393 U/L   Amylase     Status: Normal   Result Value Ref Range    Amylase 77 30 - 110 U/L   TSH with free T4 reflex     Status: Abnormal   Result Value Ref Range    TSH 0.33 (L) 0.40 - 4.00 mU/L   Tissue transglutaminase kaylah IgA and IgG     Status: Normal   Result Value Ref Range    Tissue Transglutaminase Antibody IgA 1.2 <7.0 U/mL    Tissue Transglutaminase Antibody IgG 1.0 <7.0 U/mL   Lyme Disease [place] with reflex to WB Serum     Status: Normal   Result Value Ref Range    Lyme Disease Antibodies Total 0.59 <0.90   CBC with platelets and differential     Status: Abnormal   Result Value Ref Range    WBC Count 10.2 4.0 - 11.0 10e3/uL    RBC Count 3.94 3.80 - 5.20 10e6/uL    Hemoglobin 14.0 11.7 - 15.7 g/dL    Hematocrit 37.7 35.0 - 47.0 %    MCV 96 78 - 100 fL    MCH 35.5 (H) 26.5 - 33.0 pg    MCHC 37.1 (H) 31.5 - 36.5 g/dL    RDW 15.0 10.0 - 15.0 %    Platelet Count 272 150 - 450 10e3/uL   Manual Differential     Status: None   Result Value Ref Range    % Neutrophils 49 %    % Lymphocytes 48 %    % Monocytes 3 %    % Eosinophils 0 %    %  Basophils 0 %    Absolute Neutrophils 5.0 1.6 - 8.3 10e3/uL    Absolute Lymphocytes 4.9 0.8 - 5.3 10e3/uL    Absolute Monocytes 0.3 0.0 - 1.3 10e3/uL    Absolute Eosinophils 0.0 0.0 - 0.7 10e3/uL    Absolute Basophils 0.0 0.0 - 0.2 10e3/uL    RBC Morphology Confirmed RBC Indices     Platelet Assessment  Automated Count Confirmed. Platelet morphology is normal.     Automated Count Confirmed. Platelet morphology is normal.   T4 free     Status: Normal   Result Value Ref Range    Free T4 1.39 0.76 - 1.46 ng/dL   CBC with platelets and differential     Status: Abnormal    Narrative    The following orders were created for panel order CBC with platelets and differential.  Procedure                               Abnormality         Status                     ---------                               -----------         ------                     CBC with platelets and d...[[id number]]  Abnormal            Final result               Manual Differential[[id number]]                              Final result                 Please view results for these tests on the individual orders.   Quantiferon TB Gold Plus     Status: None (In process)    Specimen: Arm, Right; Blood    Narrative    The following orders were created for panel order Quantiferon TB Gold Plus.  Procedure                               Abnormality         Status                     ---------                               -----------         ------                     Quantiferon TB Gold Plus...[[id number]]                      In process                 Quantiferon TB Gold Plus...[[id number]]                      In process                 Quantiferon TB Gold Plus...[[id number]]                      In process                 Quantiferon TB Gold Plus...[[id number]]                      In process                   Please view results for these tests on the individual orders.

## 2022-03-23 NOTE — PROGRESS NOTES
Divya is a 46 year old who is being evaluated via a billable video visit.      How would you like to obtain your AVS? [employer]  If the video visit is dropped, the invitation should be resent by: [phone], will connect through [employer].   Will anyone else be joining your video visit? No    Video Start Time: 8:07 AM    Assessment & Plan     Night sweats    - CBC with platelets and differential; Future  - Comprehensive metabolic panel (BMP + Alb, Alk Phos, ALT, AST, Total. Bili, TP); Future  - XR Chest 2 Views; Future  - Streptococcus A Rapid Scr w Reflx to PCR - Lab Collect; Future  - Symptomatic; Unknown COVID-19 Virus (Coronavirus) by PCR; Future  - Influenza A & B Antigen - Clinic Collect  - Quantiferon TB Gold Plus; Future  - TSH with free T4 reflex; Future  - Lyme Disease Nerissa with reflex to WB Serum; Future    Fatigue, unspecified type  - About the same.   - CBC with platelets and differential; Future  - Comprehensive metabolic panel (BMP + Alb, Alk Phos, ALT, AST, Total. Bili, TP); Future  - Streptococcus A Rapid Scr w Reflx to PCR - Lab Collect; Future  - Symptomatic; Unknown COVID-19 Virus (Coronavirus) by PCR; Future  - Influenza A & B Antigen - Clinic Collect    Itching  - Continues to have itching and worsening symptoms despite prednisone  - Recommend Ranitidine and BID zyrtec  - Continue Hyrdoxyzine  - Finish prednisone  - Recommend additional labs and pending labs for review.   - CBC with platelets and differential; Future  - Comprehensive metabolic panel (BMP + Alb, Alk Phos, ALT, AST, Total. Bili, TP); Future  - ranitidine (ZANTAC) 150 MG capsule; Take 1 capsule (150 mg) by mouth 2 times daily  - cetirizine (ZYRTEC) 10 MG tablet; Take 1 tablet (10 mg) by mouth 2 times daily    Cervical lymphadenopathy  - Recommend below testing and monitoring.   - CBC with platelets and differential; Future  - Comprehensive metabolic panel (BMP + Alb, Alk Phos, ALT, AST, Total. Bili, TP); Future  - US Head Neck Soft  Tissue; Future  - Lyme Disease Nerissa with reflex to WB Serum; Future    Multiple joint pain  - Has had autoimmune work up was negative back in January  - Recommend below labs as well.   - CBC with platelets and differential; Future  - Comprehensive metabolic panel (BMP + Alb, Alk Phos, ALT, AST, Total. Bili, TP); Future  - Lyme Disease Nerissa with reflex to WB Serum; Future    Elevated liver enzymes  Monitoring closely and may need to repeat ultrasound if continues to trend up  - Recommend below labs as well. Likely this is reactive to viral vs other hematological etiology.   - CBC with platelets and differential; Future  - Comprehensive metabolic panel (BMP + Alb, Alk Phos, ALT, AST, Total. Bili, TP); Future  - XR Chest 2 Views; Future  - Mononucleosis screen; Future  - Lipase; Future  - Amylase; Future  - Lyme Disease Nerissa with reflex to WB Serum; Future          Patient Instructions   - Labs today to include repeat of CBC, CMP, Lymes testing, Mono testing at our clinic with a chest xray, we will contact you to schedule this for today.   - Schedule Covid-19, influenza and strep testing online.   - Schedule neck ultrasound by calling [phone].   - Continue Prednisone 60mg daily   - Continue Hydroxyzine as needed for itching  - Start Zyrtec and Ranitidine as prescribed twice daily.   - Follow up with me in 1 week, however, I will be in touch with you in regards to results, econsult etc.       WARNER Pro CNP  Questions or concerns please feel free to send me a [employer] message or call me  Phone : [phone]          Return in about 1 week (around 3/30/2022).    WARNER Pro CNP  [facility] MELANI Stokes is a 46 year old who presents for the following health issues     HPI     ED/UC Followup:    Facility:  [facility]  Date of visit: 3/20/22  Reason for visit: Pruritic disorder, back pain   Current Status: feels a little better today but still having  symptoms. itching everywhere. Has swollen lymph nodes in neck. Fatigued, lower back pain      She has noticed that in her right side of her throat lymph node. Felt good cause she could sleep cause of the medication. Nothing has really changed in regards to symptoms. Continues to itch everywhere all the time constantly. Prednisone and hydroxyzine mask it. Feels it is there, but then comes through feeling spiky feeling. Glands on her right and left side swollen. Fatigued.   Feels hungry, does not have an appetite. Food does not sound appealing. She feels like she has to go to the bathroom a lot because the itching makes her feel like she has to go to the bathroom and drinking a lot of water.     In early 2021 she developed itching in her chest area. No rash in the area just itching. Mammogram was normal. This comes and goes. August 2021, chronic low back pain since she was 21. 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 Went on vacation August 2021 left vacation early because she was in so much pain, which is unusual for her. As she thinks back, that pain never really resolved. Coped with this. December she realized she has constant pain and lots of stress in her life with two teenagers with lots of concerns. Saw writer in January noticed glands swollen in her neck, assumed it was an allergy or something. Pain was very diffuse and increasing in March 13-14 she had some very severe night sweats. She has never had this before. March 15th the pain that she had was so severe (diffuse pain) could hardly move and took Naproxen. March 16th did not go to work. March 17th she had minimal pain. March 17th woke up with intense itching on her palms, used ice pack on her palms. March 18 she went to work like normal and the itching started to spread. Felt pretty good that day, it was as if the switch and the itching replaced the pain. March 19th the itching was really intense- tried hydroxyzine and  benadryl, baths, cbd pain oil, cbd gummy, lotion, epson salt bath. was going to wait and could not stand it she went into the ER on Sunday morning. During all this time she is having night sweats. Wake up wet/soaked. Yesterday she would try to lay down and rest and wake up wet.   During this time she has not noticed a fever.   Has had some GI stuff, but this is not unusual for her because she thinks she has some borderline IBS.    had stomach flu during this time week of the 13th.   Had test for lymes back in 2019 had some bug bites during this time.       Review of Systems   As noted above.       Objective    Vitals - Patient Reported  Pain Score: Mild Pain (3)  Pain Loc: Low Back      Vitals:  No vitals were obtained today due to virtual visit.    Physical Exam   GENERAL: Healthy, alert and no distress  EYES: Eyes grossly normal to inspection.  No discharge or erythema, or obvious scleral/conjunctival abnormalities.  RESP: No audible wheeze, cough, or visible cyanosis.  No visible retractions or increased work of breathing.    SKIN: no suspicious lesions or rashes  NEURO: Cranial nerves grossly intact.  Mentation and speech appropriate for age.  PSYCH: Mentation appears normal, affect normal/bright, judgement and insight intact, normal speech and appearance well-groomed.    Results for orders placed or performed in visit on 03/23/22   Symptomatic; Unknown COVID-19 Virus (Coronavirus) by PCR Nose     Status: Normal    Specimen: Nose; Swab   Result Value Ref Range    SARS CoV2 PCR Negative Negative, Testing sent to reference lab. Results will be returned via unsolicited result    Narrative    Testing was performed using the maria guadalupe SARS-CoV-2 assay on the maria guadalupe  [employer] System. This test should be ordered for the detection of  SARS-CoV-2 in individuals who meet SARS-CoV-2 clinical and/or  epidemiological criteria. Test performance is unknown in asymptomatic  patients. This test is for in vitro diagnostic use under  the FDA EUA  for laboratories certified under CLIA to perform high and/or moderate  complexity testing. This test has not been FDA cleared or approved. A  negative result does not rule out the presence of PCR inhibitors in  the specimen or target RNA in concentration below the limit of  detection for the assay. The possibility of a false negative should  be considered if the patient's recent exposure or clinical  presentation suggests COVID-19. This test was validated by the [facility]. This  laboratory is certified under the Clinical Laboratory Improvement  Amendments of 1988 (CLIA-88) as qualified to perform high and/or  moderate complexity laboratory testing.   Streptococcus A Rapid Screen w/Reflex to PCR - Clinic Collect     Status: Normal    Specimen: Throat; Swab   Result Value Ref Range    Group A Strep antigen Negative Negative   Influenza A/B antigen     Status: Normal    Specimen: Nose; Swab   Result Value Ref Range    Influenza A antigen Negative Negative    Influenza B antigen Negative Negative    Narrative    Test results must be correlated with clinical data. If necessary, results should be confirmed by a molecular assay or viral culture.   Group A Streptococcus PCR Throat Swab     Status: Normal    Specimen: Throat; Swab   Result Value Ref Range    Group A strep by PCR Not Detected Not Detected    Narrative    The Xpert Xpress Strep A test, performed on the [employer]  Instrument Systems, is a rapid, qualitative in vitro diagnostic test for the detection of Streptococcus pyogenes (Group A ß-hemolytic Streptococcus, Strep A) in throat swab specimens from patients with signs and symptoms of pharyngitis. The Xpert Xpress Strep A test can be used as an aid in the diagnosis of Group A Streptococcal pharyngitis. The assay is not intended to monitor treatment for Group A Streptococcus infections. The Xpert Xpress Strep A test utilizes an automated real-time  polymerase chain reaction (PCR) to detect Streptococcus pyogenes DNA.   Results for orders placed or performed in visit on 03/23/22   XR Chest 2 Views     Status: None    Narrative    CHEST TWO VIEWS  3/23/2022 10:14 AM     HISTORY: Night sweats. Elevated liver enzymes.    COMPARISON: 6/10/2016    FINDINGS: Heart size and pulmonary vascularity are within normal  limits. The lungs are clear. No pneumothorax or pleural effusion.       Impression    IMPRESSION: No radiographic evidence of acute chest abnormality.     VERNA SCRUGGS MD         SYSTEM ID:  [id number]   Results for orders placed or performed in visit on 03/23/22   Comprehensive metabolic panel (BMP + Alb, Alk Phos, ALT, AST, Total. Bili, TP)     Status: Abnormal   Result Value Ref Range    Sodium 137 133 - 144 mmol/L    Potassium 3.4 3.4 - 5.3 mmol/L    Chloride 105 94 - 109 mmol/L    Carbon Dioxide (CO2) 28 20 - 32 mmol/L    Anion Gap 4 3 - 14 mmol/L    Urea Nitrogen 13 7 - 30 mg/dL    Creatinine 0.85 0.52 - 1.04 mg/dL    Calcium 8.3 (L) 8.5 - 10.1 mg/dL    Glucose 108 (H) 70 - 99 mg/dL    Alkaline Phosphatase 181 (H) 40 - 150 U/L     (H) 0 - 45 U/L     (H) 0 - 50 U/L    Protein Total 7.5 6.8 - 8.8 g/dL    Albumin 3.3 (L) 3.4 - 5.0 g/dL    Bilirubin Total 0.7 0.2 - 1.3 mg/dL    GFR Estimate 85 >60 mL/min/1.73m2   Mononucleosis screen     Status: Normal   Result Value Ref Range    Mononucleosis Screen Negative Negative   Lipase     Status: Normal   Result Value Ref Range    Lipase 222 73 - 393 U/L   Amylase     Status: Normal   Result Value Ref Range    Amylase 77 30 - 110 U/L   TSH with free T4 reflex     Status: Abnormal   Result Value Ref Range    TSH 0.33 (L) 0.40 - 4.00 mU/L   Tissue transglutaminase kaylah IgA and IgG     Status: Normal   Result Value Ref Range    Tissue Transglutaminase Antibody IgA 1.2 <7.0 U/mL    Tissue Transglutaminase Antibody IgG 1.0 <7.0 U/mL   Lyme Disease Kaylah with reflex to WB Serum     Status: Normal   Result  Value Ref Range    Lyme Disease Antibodies Total 0.59 <0.90   CBC with platelets and differential     Status: Abnormal   Result Value Ref Range    WBC Count 10.2 4.0 - 11.0 10e3/uL    RBC Count 3.94 3.80 - 5.20 10e6/uL    Hemoglobin 14.0 11.7 - 15.7 g/dL    Hematocrit 37.7 35.0 - 47.0 %    MCV 96 78 - 100 fL    MCH 35.5 (H) 26.5 - 33.0 pg    MCHC 37.1 (H) 31.5 - 36.5 g/dL    RDW 15.0 10.0 - 15.0 %    Platelet Count 272 150 - 450 10e3/uL   Manual Differential     Status: None   Result Value Ref Range    % Neutrophils 49 %    % Lymphocytes 48 %    % Monocytes 3 %    % Eosinophils 0 %    % Basophils 0 %    Absolute Neutrophils 5.0 1.6 - 8.3 10e3/uL    Absolute Lymphocytes 4.9 0.8 - 5.3 10e3/uL    Absolute Monocytes 0.3 0.0 - 1.3 10e3/uL    Absolute Eosinophils 0.0 0.0 - 0.7 10e3/uL    Absolute Basophils 0.0 0.0 - 0.2 10e3/uL    RBC Morphology Confirmed RBC Indices     Platelet Assessment  Automated Count Confirmed. Platelet morphology is normal.     Automated Count Confirmed. Platelet morphology is normal.   T4 free     Status: Normal   Result Value Ref Range    Free T4 1.39 0.76 - 1.46 ng/dL   Babesia Species by PCR     Status: None   Result Value Ref Range    Babesia Species by PCR Not Detected     Babesia Microti by PCR Not Detected    Quantiferon TB Gold Plus Grey Tube     Status: None    Specimen: Arm, Right; Blood   Result Value Ref Range    Quantiferon Nil Tube 0.11 IU/mL   Quantiferon TB Gold Plus Green Tube     Status: None    Specimen: Arm, Right; Blood   Result Value Ref Range    Quantiferon TB1 Tube 0.13 IU/mL   Quantiferon TB Gold Plus Yellow Tube     Status: None    Specimen: Arm, Right; Blood   Result Value Ref Range    Quantiferon TB2 Tube 0.12    Quantiferon TB Gold Plus Purple Tube     Status: None    Specimen: Arm, Right; Blood   Result Value Ref Range    Quantiferon Mitogen 10.00 IU/mL   Quantiferon TB Gold Plus     Status: None    Specimen: Arm, Right; Blood   Result Value Ref Range     Quantiferon-TB Gold Plus Negative Negative    TB1 Ag minus Nil Value 0.02 IU/mL    TB2 Ag minus Nil Value 0.01 IU/mL    Mitogen minus Nil Result 9.89 IU/mL    Nil Result 0.11 IU/mL   CBC with platelets and differential     Status: Abnormal    Narrative    The following orders were created for panel order CBC with platelets and differential.  Procedure                               Abnormality         Status                     ---------                               -----------         ------                     CBC with platelets and d...[[id number]]  Abnormal            Final result               Manual Differential[[id number]]                              Final result                 Please view results for these tests on the individual orders.   Quantiferon TB Gold Plus     Status: None    Specimen: Arm, Right; Blood    Narrative    The following orders were created for panel order Quantiferon TB Gold Plus.  Procedure                               Abnormality         Status                     ---------                               -----------         ------                     Quantiferon TB Gold Plus[[id number]]                         Final result               Quantiferon TB Gold Plus...[[id number]]                      Final result               Quantiferon TB Gold Plus...[[id number]]                      Final result               Quantiferon TB Gold Plus...[[id number]]                      Final result               Quantiferon TB Gold Plus...[[id number]]                      Final result                 Please view results for these tests on the individual orders.       Video-Visit Details    Type of service:  Video Visit    Video End Time:8:44    Originating Location (pt. Location): Home    Distant Location (provider location):  [facility]     Platform used for Video Visit: [employer]

## 2022-03-24 ENCOUNTER — TELEPHONE (OUTPATIENT)
Dept: FAMILY MEDICINE | Facility: OTHER | Age: 47
End: 2022-03-24

## 2022-03-24 ENCOUNTER — ANCILLARY PROCEDURE (OUTPATIENT)
Dept: ULTRASOUND IMAGING | Facility: OTHER | Age: 47
End: 2022-03-24
Attending: NURSE PRACTITIONER
Payer: COMMERCIAL

## 2022-03-24 ENCOUNTER — ALLIED HEALTH/NURSE VISIT (OUTPATIENT)
Dept: FAMILY MEDICINE | Facility: OTHER | Age: 47
End: 2022-03-24
Payer: COMMERCIAL

## 2022-03-24 ENCOUNTER — HOSPITAL ENCOUNTER (EMERGENCY)
Facility: CLINIC | Age: 47
Discharge: HOME | End: 2022-03-24
Attending: FAMILY MEDICINE | Admitting: FAMILY MEDICINE
Payer: COMMERCIAL

## 2022-03-24 ENCOUNTER — APPOINTMENT (OUTPATIENT)
Dept: CT IMAGING | Facility: CLINIC | Age: 47
End: 2022-03-24
Attending: FAMILY MEDICINE
Payer: COMMERCIAL

## 2022-03-24 VITALS
SYSTOLIC BLOOD PRESSURE: 153 MMHG | HEART RATE: 64 BPM | OXYGEN SATURATION: 97 % | RESPIRATION RATE: 16 BRPM | DIASTOLIC BLOOD PRESSURE: 91 MMHG

## 2022-03-24 VITALS
WEIGHT: 182 LBS | SYSTOLIC BLOOD PRESSURE: 140 MMHG | TEMPERATURE: 98.1 F | OXYGEN SATURATION: 98 % | HEART RATE: 63 BPM | RESPIRATION RATE: 16 BRPM | BODY MASS INDEX: 28.5 KG/M2 | DIASTOLIC BLOOD PRESSURE: 92 MMHG

## 2022-03-24 DIAGNOSIS — R59.0 CERVICAL LYMPHADENOPATHY: Primary | ICD-10-CM

## 2022-03-24 DIAGNOSIS — E05.90 HYPERTHYROIDISM: Primary | ICD-10-CM

## 2022-03-24 DIAGNOSIS — E05.90 HYPERTHYROIDISM: ICD-10-CM

## 2022-03-24 DIAGNOSIS — Z78.9 TRIAGE ASSESSMENT CLASS 3, NONURGENT: Primary | ICD-10-CM

## 2022-03-24 DIAGNOSIS — R07.0 THROAT PAIN: ICD-10-CM

## 2022-03-24 DIAGNOSIS — R59.9 SWELLING OF LYMPH NODES: ICD-10-CM

## 2022-03-24 DIAGNOSIS — Z80.7 FAMILY HISTORY OF MULTIPLE MYELOMA: ICD-10-CM

## 2022-03-24 DIAGNOSIS — R59.0 CERVICAL LYMPHADENOPATHY: ICD-10-CM

## 2022-03-24 DIAGNOSIS — R74.8 ELEVATED LIVER ENZYMES: ICD-10-CM

## 2022-03-24 DIAGNOSIS — R94.5 ABNORMAL RESULTS OF LIVER FUNCTION STUDIES: ICD-10-CM

## 2022-03-24 DIAGNOSIS — L29.9 PRURITIC DISORDER: ICD-10-CM

## 2022-03-24 LAB
PATH REPORT.COMMENTS IMP SPEC: NORMAL
PATH REPORT.FINAL DX SPEC: NORMAL
PATH REPORT.MICROSCOPIC SPEC OTHER STN: NORMAL
PATH REPORT.MICROSCOPIC SPEC OTHER STN: NORMAL
PATH REPORT.RELEVANT HX SPEC: NORMAL
SARS-COV-2 RNA RESP QL NAA+PROBE: NEGATIVE
TTG IGA SER-ACNC: 1.2 U/ML
TTG IGG SER-ACNC: 1 U/ML

## 2022-03-24 PROCEDURE — 99207 PR NO CHARGE NURSE ONLY: CPT

## 2022-03-24 PROCEDURE — 250N000011 HC RX IP 250 OP 636: Performed by: FAMILY MEDICINE

## 2022-03-24 PROCEDURE — 76536 US EXAM OF HEAD AND NECK: CPT | Performed by: RADIOLOGY

## 2022-03-24 PROCEDURE — 99284 EMERGENCY DEPT VISIT MOD MDM: CPT | Performed by: FAMILY MEDICINE

## 2022-03-24 PROCEDURE — 85060 BLOOD SMEAR INTERPRETATION: CPT | Performed by: PATHOLOGY

## 2022-03-24 PROCEDURE — 74177 CT ABD & PELVIS W/CONTRAST: CPT

## 2022-03-24 PROCEDURE — 99285 EMERGENCY DEPT VISIT HI MDM: CPT | Mod: 25 | Performed by: FAMILY MEDICINE

## 2022-03-24 PROCEDURE — 250N000009 HC RX 250: Performed by: FAMILY MEDICINE

## 2022-03-24 RX ORDER — IOPAMIDOL 755 MG/ML
500 INJECTION, SOLUTION INTRAVASCULAR ONCE
Status: COMPLETED | OUTPATIENT
Start: 2022-03-24 | End: 2022-03-24

## 2022-03-24 RX ADMIN — IOPAMIDOL 90 ML: 755 INJECTION, SOLUTION INTRAVENOUS at 12:07

## 2022-03-24 RX ADMIN — SODIUM CHLORIDE 60 ML: 9 INJECTION, SOLUTION INTRAVENOUS at 12:07

## 2022-03-24 NOTE — ED TRIAGE NOTES
Has had multiple tests and they can't find out what is wrong.  PMD requested that patient be seen in ED.

## 2022-03-24 NOTE — DISCHARGE INSTRUCTIONS
The CT scan of your chest abdomen and pelvis were reassuring.  There were no obvious abnormalities seen.  There are some scattered pulmonary nodules and prominent lymph nodes but nothing that is immediately concerning.  Your pending lab test should be back within the next several days, and may help guide us into what the next step should be.  Dede Marcelo will call you tomorrow to check up.

## 2022-03-24 NOTE — TELEPHONE ENCOUNTER
Will call patient to follow up on ER. Future orders placed.         WARNER Pro CNP  Questions or concerns please feel free to send me a [employer] message or call me  Phone : [phone]

## 2022-03-24 NOTE — ED PROVIDER NOTES
[facility] ED Provider Note   Patient: Nithya Tolentino  MRN #:  [id number]  Date of Visit: March 24, 2022    CC:     Chief Complaint   Patient presents with     multiple issues     HPI:  Nithya Tolentino is a 46 year old female with complex symptomatology who presented to the emergency department for the third ER visit in the last week.  Patient was seen several days ago, noted to have lymphocytosis, abnormal liver enzymes in the setting of diffuse unexplained pruritus.  Patient had a follow-up with her primary care provider and had some additional testing including tickborne diseases.  Patient has been tested for numerous things including mononucleosis, COVID-19, hepatitis A, B and C, etc.  She has a peripheral smear pending.  Patient adds that she has a maternal aunt who had multiple myeloma and amyloidosis.  She has a maternal grandmother that had brain tumor.  Today she has noticed some swelling on the left side of the neck and had an ultrasound of the neck and thyroid.  Patient is experiencing night sweats, with no unexplained weight loss.  She is still having some diffuse pruritus but the combination of the steroid and 2 tablets of hydroxyzine does help.  She did not have restful sleep last night however.  She had an episode of vertigo and palpitations earlier today.  She still having some musculoskeletal low back pain.  She had been experiencing some diffuse body pains for 2 years that went away when her pruritus started and now she starting developed the pain again.    Problem List:  Patient Active Problem List    Diagnosis Date Noted     Acquired hallux limitus of left foot 10/17/2019     Priority: Medium     Other chronic sinusitis 03/06/2017     Priority: Medium     Rhinosinusitis 03/06/2017     Priority: Medium     Family history of supraventricular tachycardia 06/14/2016     Priority: Medium     MATERNAL UNCLES       Restless legs  syndrome (RLS) 11/01/2013     Priority: Medium     CARDIOVASCULAR SCREENING; LDL GOAL LESS THAN 160 08/24/2012     Priority: Medium     Dysmenorrhea 07/11/2012     Priority: Medium     Seasonal allergic rhinitis 11/22/2011     Priority: Medium     Premenopausal menorrhagia 12/01/2009     Priority: Medium       Past Medical History:   Diagnosis Date     Dysmenorrhea      Environmental allergies        MEDS: acetaminophen (TYLENOL) 500 MG tablet  AUROVELA FE 1/20 1-20 MG-MCG tablet  azelastine (ASTELIN) 0.1 % nasal spray  cetirizine (ZYRTEC) 10 MG tablet  cyanocobalamin (VITAMIN  B-12) 1000 MCG tablet  fish oil-omega-3 fatty acids 1000 MG capsule  fluticasone (FLONASE) 50 MCG/ACT nasal spray  hydrOXYzine (VISTARIL) 25 MG capsule  mometasone (NASONEX) 50 MCG/ACT nasal spray  naproxen (NAPROSYN) 500 MG tablet  predniSONE (DELTASONE) 20 MG tablet  ranitidine (ZANTAC) 150 MG capsule  RESTASIS 0.05 % ophthalmic emulsion        ALLERGIES:    Allergies   Allergen Reactions     Augmentin Nausea and Vomiting       Past Surgical History:   Procedure Laterality Date     NO HISTORY OF SURGERY         Social History     Tobacco Use     Smoking status: Never Smoker     Smokeless tobacco: Never Used   Vaping Use     Vaping Use: Never used   Substance Use Topics     Alcohol use: Yes     Comment: very rarely 1-2 times per year     Drug use: No         Review of Systems   Except as noted in HPI, all other systems were reviewed and are negative    Physical Exam     Vitals were reviewed  Patient Vitals for the past 12 hrs:   BP Temp Temp src Pulse Resp SpO2 Weight   03/24/22 1032 (!) 140/92 98.1  F (36.7  C) Oral 63 16 98 % 82.6 kg (182 lb)     GENERAL APPEARANCE: Alert and oriented x3, no acute respiratory distress  FACE: normal facies  EYES: Pupils are equal  HENT: normal external exam  NECK: No definite adenopathy; minimal soft tissue swelling of the neck; no distinct thyroid masses  RESP: normal respiratory effort; clear breath  sounds bilaterally  CV: regular rate and rhythm; no significant murmurs, gallops or rubs  ABD: soft, no tenderness; no rebound or guarding; bowel sounds are normal  MS: no gross deformities noted; normal muscle tone.  EXT: No calf tenderness or pitting edema  SKIN: no worrisome rash  NEURO: no facial droop; no focal deficits, speech is normal  PSYCH: normal mood and affect      Available Lab/Imaging Results     Results for orders placed or performed during the hospital encounter of 03/24/22 (from the past 24 hour(s))   CT Chest/Abdomen/Pelvis w Contrast    Narrative    CT CHEST/ABDOMEN/PELVIS WITH CONTRAST  3/24/2022 12:19 PM    HISTORY:  Unexplained pruritis; liver enzyme abnormality.    TECHNIQUE: CT scan obtained of the chest, abdomen, and pelvis with IV  contrast. 90 cc of Isovue-370 IV injected. Radiation dose for this  scan was reduced using automated exposure control, adjustment of the  mA and/or kV according to patient size, or iterative reconstruction  technique.    COMPARISON: Chest x-ray on 3/23/2022    FINDINGS:  Chest/mediastinum: No cardiomegaly or significant pericardial  effusion. Multiple prominent mediastinal, hilar and axillary lymph  nodes, indeterminate, could be reactive. No significant  atherosclerotic vascular calcification of the coronary arteries.    Lung/pleura: No pleural effusion or pneumothorax. Scattered nodular  opacities along the lung fissures, for example 4 mm nodule along the  left minor fissure (series 5 image 133), likely represent benign  intrafissural lymph nodes. A few scattered pulmonary nodules, for  example 3 mm posterior left lower lobe subpleural nodule (series 5  image 139) and 4 mm right upper lobe nodule (series 5 image 89).     Abdomen/pelvis:  Hepatobiliary: No suspicious focal hepatic lesion. The gallbladder is  unremarkable.    Pancreas: No main pancreatic ductal dilatation without evidence of  intrahepatic mass.    Spleen: No splenomegaly.    Adrenal glands: No  adrenal nodules.    Kidneys: No radiodense kidney/ureteral stones or hydronephrosis in the  kidney.    Bowel: No abnormally dilated bowel loops. The appendix is visualized  and appears normal.    Peritoneum: No significant free fluid in the abdomen or pelvis. No  free peritoneal portal venous gas.    Pelvic organs: Unremarkable.    Vascular: Unremarkable.    Lymph nodes: Multiple prominent but not significantly enlarged  abdominal and pelvic lymph nodes, indeterminate, could be reactive.    Bones and soft tissue: No suspicious osseous lesion.      Impression    IMPRESSION:   1. No acute pathology in the chest, abdomen and pelvis.  2. Few scattered pulmonary nodules measuring up to 4 mm in the right  upper lobe, as per Fleischner's society criteria, for low risk patient  no routine follow-up is recommended, and for high-risk patient,  optional chest CT in 12 months can be considered.  3. Multiple prominent, but not significantly enlarged, lymph nodes of  the chest and abdomen, of indeterminate etiology, could be reactive.    AMILCAR HERNANDEZ MD         SYSTEM ID:  [id number]            Impression     Final diagnoses:   Pruritic disorder   Abnormal results of liver function studies   Swelling of lymph nodes         ED Course & Medical Decision Making   Nithya Tolentino is a 46 year old female who presented to the emergency department with complex symptoms which started late last week.  Patient was seen in the emergency department over the weekend, no returns with some additional new symptoms including vertigo, palpitations, loss of appetite, and ongoing pruritus.  Patient has elevated liver enzymes that are still unexplained in the setting of diffuse pruritus and abnormal lymphocytes on her blood count.  Some additional tests are pending including tickborne diseases and peripheral blood smear.  She had some relief of her severe pruritus with prednisone and Vistaril.  She had a neck thyroid ultrasound earlier today,  developed vertigo and palpitations and because of her symptoms was directed to come to the ED.  Patient was also at the [facility] emergency department last night for 4 hours but was not any close to being roomed and she left.  Patient was seen shortly after arrival.  Her work-up was reviewed and there are several tests that are still pending including the peripheral smear.  Patient wanted to add that there is a family history of amyloidosis and multiple myeloma, brain tumors, and possibly thyroid problems.  We decided not to repeat any of her blood work since it would not change our management.  We did proceed with CT scan of the chest, abdomen and pelvis to rule out any significant lymphadenopathy, tumors or masses.  There is no acute pathology in the chest, abdomen and pelvis.  There is some scattered pulmonary nodules, and multiple prominent but not significantly enlarged lymph nodes, possibly reactive.  I spoke with the patient's primary care provider, Dede Marcelo, and formulated a follow-up plan depending on her pending labs.  Patient was started on Zyrtec and ranitidine twice daily.  She will stop her prednisone and continue hydroxyzine at nighttime for severe itching.  See discharge instructions below.      Written after-visit summary and instructions were given at the time of discharge.    Follow up Plan:   Dede Marcelo APRN [facility]  [address]  [facility] [address]  [phone]    In 1 day        Discharge Instructions:   The CT scan of your chest abdomen and pelvis were reassuring.  There were no obvious abnormalities seen.  There are some scattered pulmonary nodules and prominent lymph nodes but nothing that is immediately concerning.  Your pending lab test should be back within the next several days, and may help guide us into what the next step should be.  Dede Marcelo will call you tomorrow to check up.       Disclaimer: This note consists of words  and symbols derived from keyboarding and dictation using voice recognition software.  As a result, there may be errors that have gone undetected.  Please consider this when interpreting information found in this note.       Micaela Christina MD  03/24/22 [id number]

## 2022-03-24 NOTE — TELEPHONE ENCOUNTER
Patient here for ultrasound, having nausea and dizziness. I had advised she go in to be evaluated given her elevated liver enzymes and now having symptoms she has not had in the past.         WARNER Pro CNP  Questions or concerns please feel free to send me a [employer] message or call me  Phone : [phone]

## 2022-03-24 NOTE — PROGRESS NOTES
RN called to US to assess patient. PCP also arrived to room and evaluated patient. Plan to go to the ER for further work up and possible CT.     Vitals obtained and patient called  to bring her to the ER.     Olamide Robbins RN on 3/24/2022 at 9:12 AM

## 2022-03-25 ENCOUNTER — LAB (OUTPATIENT)
Dept: LAB | Facility: OTHER | Age: 47
End: 2022-03-25
Payer: COMMERCIAL

## 2022-03-25 ENCOUNTER — MYC MEDICAL ADVICE (OUTPATIENT)
Dept: FAMILY MEDICINE | Facility: OTHER | Age: 47
End: 2022-03-25

## 2022-03-25 ENCOUNTER — PATIENT OUTREACH (OUTPATIENT)
Dept: ONCOLOGY | Facility: CLINIC | Age: 47
End: 2022-03-25

## 2022-03-25 DIAGNOSIS — R59.0 CERVICAL LYMPHADENOPATHY: ICD-10-CM

## 2022-03-25 DIAGNOSIS — R07.0 THROAT PAIN: ICD-10-CM

## 2022-03-25 DIAGNOSIS — B27.90 EBV INFECTION: Primary | ICD-10-CM

## 2022-03-25 DIAGNOSIS — R74.8 ELEVATED LIVER ENZYMES: ICD-10-CM

## 2022-03-25 LAB
GAMMA INTERFERON BACKGROUND BLD IA-ACNC: 0.11 IU/ML
M TB IFN-G BLD-IMP: NEGATIVE
M TB IFN-G CD4+ BCKGRND COR BLD-ACNC: 9.89 IU/ML
MITOGEN IGNF BCKGRD COR BLD-ACNC: 0.01 IU/ML
MITOGEN IGNF BCKGRD COR BLD-ACNC: 0.02 IU/ML
QUANTIFERON MITOGEN: 10 IU/ML
QUANTIFERON NIL TUBE: 0.11 IU/ML
QUANTIFERON TB1 TUBE: 0.13 IU/ML
QUANTIFERON TB2 TUBE: 0.12

## 2022-03-25 PROCEDURE — 87389 HIV-1 AG W/HIV-1&-2 AB AG IA: CPT

## 2022-03-25 PROCEDURE — 36415 COLL VENOUS BLD VENIPUNCTURE: CPT

## 2022-03-25 PROCEDURE — 86665 EPSTEIN-BARR CAPSID VCA: CPT

## 2022-03-25 NOTE — PROGRESS NOTES
"Referred By  Referred To   Dede Marcelo APRN CNP   [address] [address]   Phone: [phone]   Fax: [phone]    Diagnoses: Cervical lymphadenopathy   Elevated liver enzymes   Family history of multiple myeloma   Order: Adult Oncology/Hematology Referral   \" Strong family history of multiple myloma, patient has strong concerns given her recent symptoms and history.\"    [facility]   [id number] [place] [id number]   Phone: [phone]        Referral reviewed including recent office notes, CT and labs. Intake scheduling will contact pt to schedule inperson consult at [facility].    Alyce Ferreira, RN, BSN, OCN  Hematology/Oncology Nurse Navigator  [facility]  [phone]    "

## 2022-03-28 ENCOUNTER — OFFICE VISIT (OUTPATIENT)
Dept: FAMILY MEDICINE | Facility: OTHER | Age: 47
End: 2022-03-28
Payer: COMMERCIAL

## 2022-03-28 ENCOUNTER — TELEPHONE (OUTPATIENT)
Dept: FAMILY MEDICINE | Facility: OTHER | Age: 47
End: 2022-03-28

## 2022-03-28 VITALS
HEART RATE: 89 BPM | RESPIRATION RATE: 20 BRPM | DIASTOLIC BLOOD PRESSURE: 72 MMHG | BODY MASS INDEX: 28.09 KG/M2 | HEIGHT: 67 IN | OXYGEN SATURATION: 99 % | SYSTOLIC BLOOD PRESSURE: 128 MMHG | WEIGHT: 179 LBS | TEMPERATURE: 97.8 F

## 2022-03-28 DIAGNOSIS — E05.90 HYPERTHYROIDISM: ICD-10-CM

## 2022-03-28 DIAGNOSIS — B27.00 EBV (EPSTEIN-BARR VIRUS) VIREMIA: Primary | ICD-10-CM

## 2022-03-28 LAB
ALBUMIN SERPL-MCNC: 3.5 G/DL (ref 3.4–5)
ALP SERPL-CCNC: 119 U/L (ref 40–150)
ALT SERPL W P-5'-P-CCNC: 151 U/L (ref 0–50)
ANION GAP SERPL CALCULATED.3IONS-SCNC: 3 MMOL/L (ref 3–14)
AST SERPL W P-5'-P-CCNC: 33 U/L (ref 0–45)
BILIRUB SERPL-MCNC: 0.7 MG/DL (ref 0.2–1.3)
BUN SERPL-MCNC: 15 MG/DL (ref 7–30)
CALCIUM SERPL-MCNC: 8.8 MG/DL (ref 8.5–10.1)
CHLORIDE BLD-SCNC: 102 MMOL/L (ref 94–109)
CO2 SERPL-SCNC: 29 MMOL/L (ref 20–32)
CREAT SERPL-MCNC: 0.94 MG/DL (ref 0.52–1.04)
EBV VCA IGG SER IA-ACNC: 107 U/ML
EBV VCA IGG SER IA-ACNC: POSITIVE
EBV VCA IGM SER IA-ACNC: >160 U/ML
EBV VCA IGM SER IA-ACNC: ABNORMAL
GFR SERPL CREATININE-BSD FRML MDRD: 75 ML/MIN/1.73M2
GLUCOSE BLD-MCNC: 91 MG/DL (ref 70–99)
HIV 1+2 AB+HIV1 P24 AG SERPL QL IA: NONREACTIVE
POTASSIUM BLD-SCNC: 4.4 MMOL/L (ref 3.4–5.3)
PROT SERPL-MCNC: 7.8 G/DL (ref 6.8–8.8)
SODIUM SERPL-SCNC: 134 MMOL/L (ref 133–144)
TSH SERPL DL<=0.005 MIU/L-ACNC: 0.54 MU/L (ref 0.4–4)

## 2022-03-28 PROCEDURE — 99214 OFFICE O/P EST MOD 30 MIN: CPT | Performed by: NURSE PRACTITIONER

## 2022-03-28 PROCEDURE — 36415 COLL VENOUS BLD VENIPUNCTURE: CPT | Performed by: NURSE PRACTITIONER

## 2022-03-28 PROCEDURE — 80053 COMPREHEN METABOLIC PANEL: CPT | Performed by: NURSE PRACTITIONER

## 2022-03-28 PROCEDURE — 84443 ASSAY THYROID STIM HORMONE: CPT | Performed by: NURSE PRACTITIONER

## 2022-03-28 ASSESSMENT — PATIENT HEALTH QUESTIONNAIRE - PHQ9
10. IF YOU CHECKED OFF ANY PROBLEMS, HOW DIFFICULT HAVE THESE PROBLEMS MADE IT FOR YOU TO DO YOUR WORK, TAKE CARE OF THINGS AT HOME, OR GET ALONG WITH OTHER PEOPLE: VERY DIFFICULT
SUM OF ALL RESPONSES TO PHQ QUESTIONS 1-9: 21
SUM OF ALL RESPONSES TO PHQ QUESTIONS 1-9: 21

## 2022-03-28 ASSESSMENT — PAIN SCALES - GENERAL: PAINLEVEL: MILD PAIN (2)

## 2022-03-28 NOTE — TELEPHONE ENCOUNTER
RN's I am seeing patient this afternoon however, her PHQ did flag concerns for self harm please call and check on patient.       WARNER Pro CNP  Questions or concerns please feel free to send me a [employer] message or call me  Phone : [phone]

## 2022-03-28 NOTE — PROGRESS NOTES
Assessment & Plan     EBV (Benitez-Barr virus) viremia  - Positive EBV with symptoms  - Discussed treatment with fluids, rest and monitoring for severe symptoms. If any dehydration, severe abdominal pain or fevers to go in for evaluation  - Recheck with me in 4 weeks.   - Advised recheck her enzymes today.   - Discussed I am not certain the itching and that is likely an uncommon symptoms related to her viral illness. She is seeing improvement of this, uncertain if this is the zyrtec and Ranitdine or viral improvement. Her throat has improved. Continues to be very fatigued.   - Comprehensive metabolic panel (BMP + Alb, Alk Phos, ALT, AST, Total. Bili, TP); Future  - Comprehensive metabolic panel (BMP + Alb, Alk Phos, ALT, AST, Total. Bili, TP)    Hyperthyroidism  - Denies heart palpitations, has some emotional stress due to current illness. Will recheck labs and has appointment with endocrinology in 1 week. Will likely need repeat of thyroid ultrasound in the future.   - TSH with free T4 reflex; Future  - TSH with free T4 reflex      Depression Screening Follow Up    PHQ 3/28/2022   PHQ-9 Total Score 21   Q9: Thoughts of better off dead/self-harm past 2 weeks Several days   F/U: Thoughts of suicide or self-harm No   F/U: Safety concerns No     Last PHQ-9 3/28/2022   1.  Little interest or pleasure in doing things 3   2.  Feeling down, depressed, or hopeless 3   3.  Trouble falling or staying asleep, or sleeping too much 3   4.  Feeling tired or having little energy 3   5.  Poor appetite or overeating 3   6.  Feeling bad about yourself 0   7.  Trouble concentrating 3   8.  Moving slowly or restless 2   Q9: Thoughts of better off dead/self-harm past 2 weeks 1   PHQ-9 Total Score 21   In the past two weeks have you had thoughts of suicide or self harm? No   Do you have concerns about your personal safety or the safety of others? No         No flowsheet data found.      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 Declines assistance at this time.     Discussed the following ways the patient can remain in a safe environment:     There are no Patient Instructions on file for this visit.    Return in about 4 weeks (around 4/25/2022).    WARNER Pro CNP  [facility] MELANI Stokes is a 46 year old who presents for the following health issues     History of Present Illness       Mental Health Follow-up:                    Today's PHQ-9         PHQ-9 Total Score: 21  PHQ-9 Q9 Thoughts of better off dead/self-harm past 2 weeks :   (P) Several days  Thoughts of suicide or self harm: (P) No  Self-harm Plan:     Self-harm Action:       Safety concerns for self or others: (P) No    How difficult have these problems made it for you to do your work, take care of things at home, or get along with other people: Very difficult        Reason for visit:  Follow up  Symptom onset:  1-2 weeks ago  Symptoms include:  Diffuse severe itching, tingling in hands and feet, warm hand and feet, swollen glands, sore throat, night sweats, insomnia, irritability, stuffy nose/congestion, fatigue  Symptom intensity:  Mild  Symptom progression:  Improving  Had these symptoms before:  Yes  Has tried/received treatment for these symptoms:  Yes  Previous treatment was successful:  No  What makes it worse:  Heat  What makes it better:  Coolness, medication seems to have helped the itching, Naproxen/tylenol    She eats 2-3 servings of fruits and vegetables daily.She consumes 1 sweetened beverage(s) daily.She exercises with enough effort to increase her heart rate 9 or less minutes per day.  She exercises with enough effort to increase her heart rate 3 or less days per week.   She is taking medications regularly.       ED/UC Followup:    Facility:  [facility]  Date of visit: 3/24/2022  Reason for visit: Pruritic disorder  Current  "Status: improved           Review of Systems         Objective    /72   Pulse 89   Temp 97.8  F (36.6  C) (Temporal)   Resp 20   Ht 1.702 m (5' 7.01\")   Wt 81.2 kg (179 lb)   LMP 03/15/2022 (Exact Date)   SpO2 99%   BMI 28.03 kg/m    Body mass index is 28.03 kg/m .  Physical Exam   GENERAL: fatigued  EYES: Eyes grossly normal to inspection  HENT: oral mucous membranes moist  Abdomen- soft and non-tender.   NECK: cervical adenopathy bilateral  RESP: lungs clear to auscultation - no rales, rhonchi or wheezes  SKIN: no suspicious lesions or rashes  PSYCH: mentation appears normal, affect normal/bright    Lab on 03/25/2022   Component Date Value Ref Range Status     EBV Capsid Nerissa IgM Instrument Value 03/25/2022 >160.0 (A) <36.0 U/mL Final     EBV Capsid Antibody IgM 03/25/2022 Positive, suggests current or recent infection. (A) No detectable antibody. Final     EBV Capsid Nerissa IgG Instrument Value 03/25/2022 107.0 (A) <18.0 U/mL Final     EBV Capsid Antibody IgG 03/25/2022 Positive (A) No detectable antibody. Final    Suggests recent or past exposure.     HIV Antigen Antibody Combo 03/25/2022 Nonreactive  Nonreactive Final    HIV-1 p24 Ag & HIV-1/HIV-2 Ab Not Detected         "

## 2022-03-29 ENCOUNTER — OFFICE VISIT (OUTPATIENT)
Dept: OBGYN | Facility: OTHER | Age: 47
End: 2022-03-29
Payer: COMMERCIAL

## 2022-03-29 VITALS
WEIGHT: 179.75 LBS | BODY MASS INDEX: 28.15 KG/M2 | SYSTOLIC BLOOD PRESSURE: 132 MMHG | OXYGEN SATURATION: 100 % | DIASTOLIC BLOOD PRESSURE: 72 MMHG | HEART RATE: 78 BPM

## 2022-03-29 DIAGNOSIS — N94.6 DYSMENORRHEA: Primary | ICD-10-CM

## 2022-03-29 PROCEDURE — 99214 OFFICE O/P EST MOD 30 MIN: CPT | Performed by: OBSTETRICS & GYNECOLOGY

## 2022-03-29 ASSESSMENT — PATIENT HEALTH QUESTIONNAIRE - PHQ9: SUM OF ALL RESPONSES TO PHQ QUESTIONS 1-9: 21

## 2022-03-29 NOTE — PROGRESS NOTES
"  SUBJECTIVE:                                                   Nithya Tolentino is a 46 year old female who presents to clinic today for the following health issue(s):  Patient presents with:  Consult: Ablation    A total of 35 minutes was spent in care of Divya on the day of service including 25 minutes spent in face-to-face time and the remainder in chart review, care coordination, documentation on the day of service.    Divya is a 46-year-old P0 last menstrual period March 15 on birth control pills.  She comes in complaining of painful periods and periods getting heavier.    Her chief complaint starts with how periods 15 years ago were heavier and more painful going from average of 5 days to 7 days and 5 days heavy.  That seemed to be resolved 5 years ago when she started birth control pills and she noted that her periods were much lighter but she still spotted a few days.  Now she notes that periods are \"ramping up\" taking more pain meds than she has had in the past.  She talked to her nurse practitioner and the thought of having an endometrial ablation was considered and suggested.  Currently she states that her periods last approximately 3 to 5 days she typically uses a tampon.  She only needs to use tampons for a few days.  She has had no accidents.  Her other significant complaint is cramps for which she is taken 1000 to 1500 mg of Tylenol and she does take Naprosyn 1 or 2 at nighttime but notes that it makes her feel drowsy and so she tries not to take that.    She makes a statement of she is here to talk about ablation as a way to stop her periods completely.  We spent the bulk of the time discussing exactly how an ablation is done and what are the odds that she would be amenorrheic.  I drew diagrams of the uterus explained how it ablation is done with a NovaSure versus a ball ablation versus thermal ablation versus cryo.  In none of those cases can 1 say that ablation is going to guarantee that there " will be no more periods.  There is different amenorrheic rates ranging from 40% all the way up to 85 to 90% depending on the type of ablation.  And I described each of those in detail.    Past medical history:  She is relatively free of medical issues though she is currently being a evaluated for hyper versus hypothyroidism.    Social history:  She has had no children however she has adopted 2.  She is .    Past surgical history:  Is negative.    In her discussions I explained how these can fail and they fail up to potential 10% of cases where periods then are worse off as well as the pill pelvic pain and cramping is worse and there is that risk to contend with.  I do suggest that before any decisions are made that she have a pelvic ultrasound.  I did review her previous pelvic ultrasound which was done in .  Films were reviewed by myself and appeared consistent with the general takedown that is being a relatively normal uterus.    I do suggest that she have a follow-up ultrasound to see how things have changed in the last 11 to 12 years and the orders will be put in for that.   I did caution her though that her situation could certainly be worsened by having ablation and it does not seem that she would be a good candidate for hysterectomy at this point as she is taking minimal pain medication and having a relatively light periods.    Assessment;  Is a 46-year-old with a history of relatively light periods but but feels like it is ramping up.  She takes minimal medications for her dysmenorrhea.    Plan:  We discussed taking preemptive NSAIDs suggesting she start on the Naprosyn 3 to 4 days before her next cycle and see if that does not take care of the issue regarding dysmenorrhea.  Again her periods are pretty light in description, suggest that she have a pelvic ultrasound        Patient's last menstrual period was 03/15/2022 (exact date)..     Patient is sexually active, .  Using vasectomy for  contraception.    reports that she has never smoked. She has never used smokeless tobacco.    STD testing offered?  Declined    Health maintenance updated:  yes    Today's PHQ-2 Score:   PHQ-2 ( 1999 [employer]) 3/28/2022   Q1: Little interest or pleasure in doing things 3   Q2: Feeling down, depressed or hopeless 3   PHQ-2 Score 6   PHQ-2 Total Score (12-17 Years)- Positive if 3 or more points; Administer PHQ-A if positive -   Q1: Little interest or pleasure in doing things Nearly every day   Q2: Feeling down, depressed or hopeless Nearly every day   PHQ-2 Score 6     Today's PHQ-9 Score:   PHQ-9 SCORE 3/28/2022   PHQ-9 Total Score MyChart 21 (Severe depression)   PHQ-9 Total Score 21     Today's DARIAN-7 Score: No flowsheet data found.    Problem list and histories reviewed & adjusted, as indicated.  Additional history: as documented.    Patient Active Problem List   Diagnosis     Seasonal allergic rhinitis     Dysmenorrhea     CARDIOVASCULAR SCREENING; LDL GOAL LESS THAN 160     Restless legs syndrome (RLS)     Family history of supraventricular tachycardia     Other chronic sinusitis     Rhinosinusitis     Premenopausal menorrhagia     Acquired hallux limitus of left foot     Past Surgical History:   Procedure Laterality Date     NO HISTORY OF SURGERY        Social History     Tobacco Use     Smoking status: Never Smoker     Smokeless tobacco: Never Used   Substance Use Topics     Alcohol use: Yes     Comment: very rarely 1-2 times per year      Problem (# of Occurrences) Relation (Name,Age of Onset)    Anesthesia Reaction (1) Father (John Contreras): difficulty coming out    Arthritis (1) Maternal Grandmother: osteoarthritis     Cancer (1) Maternal Grandfather (Nicholas Begum): bone    Cerebrovascular Disease (1) Maternal Grandfather (Nicholas Begum): Passed in 1997    Dementia (1) Mother (Tatum Contreras)    Gastrointestinal Disease (1) Other: cousin on dads side has celiac    Hypertension (2) Mother (Tatum Contreras),  Paternal Grandmother (Karissa Contreras): Passed in 2007 after brain tumor surgery    Neurologic Disorder (1) Paternal Grandmother (Karissa Contreras): brain tumor    Other Cancer (3) Maternal Grandfather (Nicholas Begum): bone cancer, Cousin (Bethany Begum): Melanoma, Other (Maternal Aunt): Amyloid Cancer    Thyroid Disease (2) Mother (Tatum Contreras), Maternal Aunt            Current Outpatient Medications   Medication Sig     acetaminophen (TYLENOL) 500 MG tablet Take 1-2 tablets by mouth every 6 hours as needed. 2 tablets before bedtime     AUROVELA FE 1/20 1-20 MG-MCG tablet TAKE 1 TABLET BY MOUTH DAILY     azelastine (ASTELIN) 0.1 % nasal spray USE 2 SPRAYS IN EACH NOSTRIL TWICE DAILY     cetirizine (ZYRTEC) 10 MG tablet Take 1 tablet (10 mg) by mouth 2 times daily     fish oil-omega-3 fatty acids 1000 MG capsule Take 2 g by mouth daily      fluticasone (FLONASE) 50 MCG/ACT nasal spray Spray 1 spray into both nostrils daily as needed for rhinitis or allergies      hydrOXYzine (VISTARIL) 25 MG capsule Take 1-2 capsules (25-50 mg) by mouth 3 times daily as needed for itching (Patient not taking: Reported on 3/28/2022)     mometasone (NASONEX) 50 MCG/ACT nasal spray Spray 2 sprays into both nostrils daily     naproxen (NAPROSYN) 500 MG tablet Take 1 tablet (500 mg) by mouth 2 times daily as needed for moderate pain     ranitidine (ZANTAC) 150 MG capsule Take 1 capsule (150 mg) by mouth 2 times daily     RESTASIS 0.05 % ophthalmic emulsion INSTILL 1 DROP IN BOTH EYES TWICE DAILY     No current facility-administered medications for this visit.     Allergies   Allergen Reactions     Augmentin Nausea and Vomiting           OBJECTIVE:     LMP 03/15/2022 (Exact Date)   There is no height or weight on file to calculate BMI.    Exam:  deferred     In-Clinic Test Results:  Results for orders placed or performed in visit on 03/28/22 (from the past 24 hour(s))   Comprehensive metabolic panel (BMP + Alb, Alk Phos, ALT, AST,  Total. Bili, TP)   Result Value Ref Range    Sodium 134 133 - 144 mmol/L    Potassium 4.4 3.4 - 5.3 mmol/L    Chloride 102 94 - 109 mmol/L    Carbon Dioxide (CO2) 29 20 - 32 mmol/L    Anion Gap 3 3 - 14 mmol/L    Urea Nitrogen 15 7 - 30 mg/dL    Creatinine 0.94 0.52 - 1.04 mg/dL    Calcium 8.8 8.5 - 10.1 mg/dL    Glucose 91 70 - 99 mg/dL    Alkaline Phosphatase 119 40 - 150 U/L    AST 33 0 - 45 U/L     (H) 0 - 50 U/L    Protein Total 7.8 6.8 - 8.8 g/dL    Albumin 3.5 3.4 - 5.0 g/dL    Bilirubin Total 0.7 0.2 - 1.3 mg/dL    GFR Estimate 75 >60 mL/min/1.73m2   TSH with free T4 reflex   Result Value Ref Range    TSH 0.54 0.40 - 4.00 mU/L       ASSESSMENT/PLAN:                                                      See above    Erick Easley MD  [facility]

## 2022-03-30 ENCOUNTER — TELEPHONE (OUTPATIENT)
Dept: FAMILY MEDICINE | Facility: OTHER | Age: 47
End: 2022-03-30

## 2022-03-30 LAB
A PHAGOCYTOPH DNA BLD QL NAA+PROBE: NOT DETECTED
B MICROTI DNA BLD QL NAA+PROBE: NOT DETECTED
BABESIA DNA BLD QL NAA+PROBE: NOT DETECTED
E CHAFFEENSIS DNA BLD QL NAA+PROBE: NOT DETECTED
E EWINGII DNA SPEC QL NAA+PROBE: NOT DETECTED
EHRLICHIA DNA SPEC QL NAA+PROBE: NOT DETECTED

## 2022-03-30 NOTE — TELEPHONE ENCOUNTER
"Nurse Triage SBAR  Is this a 2nd Level Triage? NO    SITUATION:                                                      Nithya Tolentino is a 46 year old female calling back, provider message given and additional details gathered about left sided pain    BACKGROUND:                                                      Hx: Patient reports the intermittent pain she is experiencing is not new, it returned last evening around 8pm. This has been occurring for the last 18 months off and on. Patient recalls that it initially started out as itching, from the top of her left nipple, up her breast and upper chest, into her left armpit and left collar bone. Then the itching changed to pain. The pain comes and goes, but nothing seems to bring it on. Left chest wall, breast, and now wraps around her left shoulder blade are tender to the touch. Rates pain a 4/10, it is \"noticeable but not terrible\". Sometimes it feels like a sharp \"poke\" and other times it is just tender.      Denies SOB or chest pressure/pain. Taking deep breaths does not change the sensation. Reports she had restless legs and arms last evening so she took a Naproxen, which did seem to help the pain.     Nothing noted on her skin. No rash, no blisters, no redness or warmth to the affected area. No lumps or bumps noted in her armpits.     Itching was better today, so she did not take the Zyrtec     NURSE ASSESSMENT:                                                      Provider update    RECOMMENDATION(S) and PLAN:                                                      Patient just wants provider to be aware of her symptoms, as she asked to notify provider with new or changing symptoms. \"I don't expect her to do anything\".     RN advised patient to call back if symptoms worsen or change in any way. Advised to go to ER if chest pain or difficulty breathing develop.       "

## 2022-03-30 NOTE — TELEPHONE ENCOUNTER
RN's please call patient. Recommend  make a visit with his PCP to discuss getting tested.   As for her new symptoms I am not certain if this is related or what specifically is going on. Hard to know so please triage.     Yes I can place a infectious disease referral, they will contact her within 1-2 business days.       WARNER Pro CNP  Questions or concerns please feel free to send me a [employer] message or call me  Phone : [phone]

## 2022-03-30 NOTE — TELEPHONE ENCOUNTER
Left message for pt asking that she call the clinic back and speak to a triage nurse. Responded to Yinat. See telephone encounter.     Hilary Morrison, KITTYN, RN, PHN  Registered Nurse-Clinic Triage  [facility]  3/30/2022 at 9:43 AM

## 2022-03-31 ENCOUNTER — LAB (OUTPATIENT)
Dept: LAB | Facility: OTHER | Age: 47
End: 2022-03-31
Payer: COMMERCIAL

## 2022-03-31 DIAGNOSIS — B27.90 EBV INFECTION: ICD-10-CM

## 2022-03-31 LAB
EBV EA-D IGG SER-ACNC: 23 U/ML (ref 0–9)
EBV EA-D IGG SER-ACNC: POSITIVE
EBV NA IGG SER IA-ACNC: <3 U/ML
EBV NA IGG SER IA-ACNC: NORMAL [IU]/ML

## 2022-03-31 PROCEDURE — 36415 COLL VENOUS BLD VENIPUNCTURE: CPT

## 2022-03-31 PROCEDURE — 86663 EPSTEIN-BARR ANTIBODY: CPT

## 2022-03-31 PROCEDURE — 86664 EPSTEIN-BARR NUCLEAR ANTIGEN: CPT

## 2022-03-31 NOTE — TELEPHONE ENCOUNTER
Labs placed.       WARNER Pro CNP  Questions or concerns please feel free to send me a [employer] message or call me  Phone : [phone]

## 2022-04-04 ASSESSMENT — ENCOUNTER SYMPTOMS
NECK MASS: 0
HEMATURIA: 0
HALLUCINATIONS: 0
NERVOUS/ANXIOUS: 0
DISTURBANCES IN COORDINATION: 1
LOSS OF CONSCIOUSNESS: 0
TINGLING: 1
LEG PAIN: 0
NIGHT SWEATS: 1
POOR WOUND HEALING: 1
JOINT SWELLING: 0
FEVER: 0
SPEECH CHANGE: 0
DECREASED LIBIDO: 0
PARALYSIS: 0
NUMBNESS: 0
FLANK PAIN: 0
STIFFNESS: 0
HEADACHES: 1
CHILLS: 0
DECREASED CONCENTRATION: 1
DECREASED APPETITE: 1
ARTHRALGIAS: 0
MUSCLE WEAKNESS: 1
FATIGUE: 1
ABDOMINAL PAIN: 0
HOT FLASHES: 1
MEMORY LOSS: 1
TREMORS: 0
SINUS CONGESTION: 1
WEIGHT LOSS: 1
DIZZINESS: 1
BACK PAIN: 1
MYALGIAS: 1
DIFFICULTY URINATING: 0
PALPITATIONS: 1
NECK PAIN: 1
EYE REDNESS: 1
PANIC: 0
SWOLLEN GLANDS: 1
INCREASED ENERGY: 1
EYE PAIN: 1
SYNCOPE: 0
DIARRHEA: 0
HEARTBURN: 1
POLYDIPSIA: 0
BLOOD IN STOOL: 0
ALTERED TEMPERATURE REGULATION: 1
NAIL CHANGES: 1
INSOMNIA: 1
JAUNDICE: 0
RECTAL PAIN: 1
SLEEP DISTURBANCES DUE TO BREATHING: 0
HOARSE VOICE: 0
TROUBLE SWALLOWING: 0
CONSTIPATION: 0
SKIN CHANGES: 1
DEPRESSION: 0
SMELL DISTURBANCE: 0
POLYPHAGIA: 0
LIGHT-HEADEDNESS: 0
DOUBLE VISION: 0
EYE WATERING: 0
DYSURIA: 0
ORTHOPNEA: 0
MUSCLE CRAMPS: 1
EXERCISE INTOLERANCE: 0
BOWEL INCONTINENCE: 0
WEAKNESS: 0
EYE IRRITATION: 1
HYPOTENSION: 0
NAUSEA: 1
SEIZURES: 0
HYPERTENSION: 0
BRUISES/BLEEDS EASILY: 0
VOMITING: 0
TASTE DISTURBANCE: 1
BLOATING: 0
SINUS PAIN: 1

## 2022-04-05 ENCOUNTER — VIRTUAL VISIT (OUTPATIENT)
Dept: ENDOCRINOLOGY | Facility: CLINIC | Age: 47
End: 2022-04-05
Attending: NURSE PRACTITIONER
Payer: COMMERCIAL

## 2022-04-05 DIAGNOSIS — E04.2 MULTIPLE THYROID NODULES: ICD-10-CM

## 2022-04-05 DIAGNOSIS — R79.89 LOW TSH LEVEL: Primary | ICD-10-CM

## 2022-04-05 PROCEDURE — 99204 OFFICE O/P NEW MOD 45 MIN: CPT | Mod: GT | Performed by: INTERNAL MEDICINE

## 2022-04-05 NOTE — PROGRESS NOTES
Video-Visit Details    Type of service:  Video Visit    Video call duration:  Started: 8:14 am   Ended: 9:00 am     Originating Location (pt. Location): Home  Distant Location (provider location):  [facility]   Platform used for Video Visit: Hector    Due to the COVID 19 pandemic this visit was converted to a video visit in order to help prevent spread of infection in this patient and the general population.     The patient is seen in consultation at the request of Dr. Dede Marcelo.     Nithya Tolentino is a 46 year old female with a past medical history significant for restless leg syndrome, allergies, dysmenorrhea, referred for evaluation of abnormal TSH.    On prior lab work, the TSH has been either minimally suppressed or in the lower half of normal range since 2013.  There were transient episodes when the TSH was mildly low, in 2015, 2016, 2017 and, more recently, on 3/23/2022, when it was 0.33, associated with a normal free T4 of 1.39.  The TSH spontaneously normalized to 0.54, on 3/28/2022.  Prior free T4 levels have always been normal.  Thyroid peroxidase antibodies were detectable but below threshold at 35, on 1/25/2022.    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 As per patient, the neck lymph nodes have been enlarged since December 2021.    The neck ultrasound from 3/24/2022, done for evaluation of cervical lymphadenopathy, revealed the following findings: Right thyroid lobe measuring 5.1 x 1.8 x 2 cm, left thyroid lobe measuring 4.8 x 1.6 x 1.7 cm, with an isthmus of 3 mm.  The echotexture was fairly homogeneous.  2 thyroid nodules were described, one located in the right superior pole measuring 0.6 cm, hypoechoic and 1 located in the inferior right thyroid pole, measuring 1.1 cm, iso/hyperechoic.  I reviewed the ultrasound images.  The vascularity of the gland is not increased.  The right inferior thyroid nodule is hard to delineate.  There are no abnormal looking  lymph nodes.    Other pertinent labs:  3/20/22 CRP elevated at 8.4. it was normal in 1/2022 1/21/22 ferritin 103   Screening for RA negative 1/2022  Tissue transglutaminase antibodies negative in 2012, 2016 at 2022  Elevated B12 in January 2022  Normal vitamin D levels in 2013 in 2015  Elevated MCH on recent CBC from 3/23/2022    Past Medical History   Past Medical History:   Diagnosis Date     Dysmenorrhea      Environmental allergies    Sinusitis  Skin dermatofibroma  Ulnar tunnel syndrome   CTS   LBP - Degenerative changes of the lumbar spine  Restless leg syndrome     Past Surgical History   Past Surgical History:   Procedure Laterality Date     NO HISTORY OF SURGERY     [place] teeth surgery     Current Medications    Current Outpatient Medications:      acetaminophen (TYLENOL) 500 MG tablet, Take 1-2 tablets by mouth every 6 hours as needed. 2 tablets before bedtime, Disp: , Rfl:      AUROVELA FE 1/20 1-20 MG-MCG tablet, TAKE 1 TABLET BY MOUTH DAILY, Disp: 84 tablet, Rfl: 1     azelastine (ASTELIN) 0.1 % nasal spray, USE 2 SPRAYS IN EACH NOSTRIL TWICE DAILY, Disp: 30 mL, Rfl: 1     cetirizine (ZYRTEC) 10 MG tablet, Take 1 tablet (10 mg) by mouth 2 times daily, Disp: 60 tablet, Rfl: 0     fish oil-omega-3 fatty acids 1000 MG capsule, Take 2 g by mouth daily , Disp: , Rfl:      fluticasone (FLONASE) 50 MCG/ACT nasal spray, Spray 1 spray into both nostrils daily as needed for rhinitis or allergies , Disp: , Rfl:      hydrOXYzine (VISTARIL) 25 MG capsule, Take 1-2 capsules (25-50 mg) by mouth 3 times daily as needed for itching (Patient not taking: Reported on 3/28/2022), Disp: 45 capsule, Rfl: 0     mometasone (NASONEX) 50 MCG/ACT nasal spray, Spray 2 sprays into both nostrils daily, Disp: 17 g, Rfl: 11     naproxen (NAPROSYN) 500 MG tablet, Take 1 tablet (500 mg) by mouth 2 times daily as needed for moderate pain, Disp: 60 tablet, Rfl: 3     ranitidine (ZANTAC) 150 MG capsule, Take 1 capsule (150 mg) by mouth 2  "times daily, Disp: 90 capsule, Rfl: 0     RESTASIS 0.05 % ophthalmic emulsion, INSTILL 1 DROP IN BOTH EYES TWICE DAILY, Disp: , Rfl:     Family History   Problem Relation Age of Onset     Thyroid Disease - hypothyroidism  Mother      Dementia Mother      Hypertension Mother      Anesthesia Reaction Father         difficulty coming out     Arthritis Maternal Grandmother         osteoarthritis      Cancer Maternal Grandfather         bone     Cerebrovascular Disease Maternal Grandfather         Passed in 1997     Neurologic Disorder Paternal Grandmother         brain tumor - benign      Hypertension Paternal Grandmother         Passed in 2007 after brain tumor surgery     Gastrointestinal Disease Other         cousin on dads side has celiac   Maternal aunt who had thyroid disease, multiple myeloma and amyloidosis. Another maternal aunt had part of the thyroid surgically removed.     Social History  .  She has 2 adopted children. Both have mental issues.  She denies smoking, drinking alcohol or using illicit drugs. Occupation: mental health therapist.     Review of Systems   Systemic:             Fatigue present for many years; sleeps 7 hrs everyday in the 90s, and at least twice a night paraspinous areasly and wakes up every 19 minutes to 1 hr due to restlessness, uses the restroom twice a night; doesn't wake up rested in the morning; once a week she wakes up with a headache. Weight fairly stable on our records   Eye:                      Dry eyes for 5 years; re stasis helps; eyes are itchy and red; hard to focus, no double vision   Vishal-Laryngeal:     No dysphagia, some hoarseness since being dx with EB; no cough; \"globus pallidus\" present intermittently for the last month      Cardiovascular:    H/o heart palpitations 5 years ago; twice a week in the last 6 weeks she has had rapid rhythm palpitations, seconds duration; no CP    Pulmonary:           No pulmonary symptoms  Gastrointestinal:   Longstanding " constipation  Genitourinary:       increased urination; urinates twice a night for the last 5 years   Endocrine:            cold intolerance noted during wintertime; feeling warmer recently; MP regular on BCP; have been fairly regular prior to staring the BCP; no significantly heavy bleedings but painful menstrual periods   Neurological:        Diffuse musculoskeletal pain since August 2021 (feels like she has the flu) - got worse on 3/15 and improved when the pruritis got worse and is now significantly better.   longstanding headaches; at times she has a tremor - mainly when hungry; numbness and tingling sensation in her hands and feet - for the last month; episodes of vertigo present ~ once a week and short lived. She is scheduled to see neurology.   Musculoskeletal:  Low back joint pain; left big toe pain due to hallux    Skin:                    Chronic dry skin, no hair falling out   Psychological:     Anxiety - longstanding; saw a therapist 1 year ago and improved significantly      Vital Signs     Previous Weights:    Wt Readings from Last 10 Encounters:   03/29/22 81.5 kg (179 lb 12 oz)   03/28/22 81.2 kg (179 lb)   03/24/22 82.6 kg (182 lb)   03/23/22 82.9 kg (182 lb 12.8 oz)   01/21/22 84.4 kg (186 lb)   03/30/21 80.3 kg (177 lb)   11/03/20 80.5 kg (177 lb 8 oz)   12/05/19 80.5 kg (177 lb 8 oz)   10/17/19 79.8 kg (176 lb)   10/03/19 79.8 kg (176 lb)        LMP 03/15/2022 (Exact Date)     Physical Exam  General Appearance: alert, no distress noted   Eyes: grossly normal to inspection, conjunctivae and sclerae normal, no lid lag or stare   Respiratory: no audible wheeze, cough, or visible cyanosis.  No visible retractions or increased work of breathing.  Able to speak fully in complete sentences.  Neurological: Cranial nerves grossly intact, mentation intact and speech normal; no tremor of the hands   Skin: no lesions on exposed skin   Psychological: mentation appears normal, affect normal, judgement and  insight intact, normal speech and appearance well-groomed    Lab Results  I reviewed prior lab results documented in Epic.  TSH   Date Value Ref Range Status   03/28/2022 0.54 0.40 - 4.00 mU/L Final   03/23/2022 0.33 (L) 0.40 - 4.00 mU/L Final   01/21/2022 0.43 0.40 - 4.00 mU/L Final   07/27/2021 0.71 0.40 - 4.00 mU/L Final   12/05/2019 0.65 0.40 - 4.00 mU/L Final   12/13/2018 0.66 0.40 - 4.00 mU/L Final   12/21/2017 0.40 0.40 - 4.00 mU/L Final   03/06/2017 0.31 (L) 0.40 - 4.00 mU/L Final   01/05/2017 0.37 (L) 0.40 - 4.00 mU/L Final       Assessment     Nithya Tolentino is a 46 year old female with a past medical history significant for restless leg syndrome, allergies, dysmenorrhea, referred for evaluation of abnormal TSH.     1. Low normal TSH   The patient has been having low normal or minimally suppressed TSH values since 2013.  Prior free T4 levels have been normal.  Most recent TSH normalized at 0.54 on 3/28/2022, after being suppressed at 0.33, 5 days prior, in the context of recent infection w Benitez-Bar virus.     Differential diagnosis: Most likely subacute thyroiditis due to viral infection, less likely subclinical Graves' disease.  Of note that she does have a history of dry and itchy eyes, but no proptosis.  There is also a strong family history of hypothyroidism.    I have counseled the patient on signs and symptoms of hypo and hyperthyroidism, the spontaneous resolutions of mild hyperthyroidism following viral infections.  As long as the TSH remains in the normal range or at the lower end of normal range, no treatment is indicated.     Plan:   F/up TSI   Recheck TFTs in 6 months     2.  Thyroid nodules.  She has no risk factors for thyroid cancer.  Discussed about high prevalence of thyroid nodules in general population, their risk of cancer of 5-8% and the overall good prognosis (low mortality) of thyroid cancer.  Considering the ultrasound appearance of the thyroid nodules, I recommended a  follow-up ultrasound in 6 months.    The patient has many other symptoms and I suspect most of them are related to the recent viral infection.    Orders Placed This Encounter   Procedures     US Thyroid     Thyroid stimulating immunoglobulin     TSH     T4 free     Answers for HPI/ROS submitted by the patient on 4/4/2022  General Symptoms: Yes  Skin Symptoms: Yes  HENT Symptoms: Yes  EYE SYMPTOMS: Yes  HEART SYMPTOMS: Yes  LUNG SYMPTOMS: No  INTESTINAL SYMPTOMS: Yes  URINARY SYMPTOMS: Yes  GYNECOLOGIC SYMPTOMS: Yes  BREAST SYMPTOMS: No  SKELETAL SYMPTOMS: Yes  BLOOD SYMPTOMS: Yes  NERVOUS SYSTEM SYMPTOMS: Yes  MENTAL HEALTH SYMPTOMS: Yes  Ear pain: No  Ear discharge: No  Hearing loss: No  Tinnitus: Yes  Nosebleeds: No  Congestion: Yes  Sinus pain: Yes  Trouble swallowing: No   Voice hoarseness: No  Mouth sores: No  Tooth pain: No  Gum tenderness: Yes  Bleeding gums: No  Change in taste: Yes  Change in sense of smell: No  Dry mouth: Yes  Hearing aid used: No  Neck lump: No  Fever: No  Loss of appetite: Yes  Weight loss: Yes  Fatigue: Yes  Night sweats: Yes  Chills: No  Increased stress: No  Excessive hunger: No  Excessive thirst: No  Feeling hot or cold when others believe the temperature is normal: Yes  Loss of height: No  Post-operative complications: No  Surgical site pain: No  Hallucinations: No  Change in or Loss of Energy: Yes  Hyperactivity: No  Confusion: No  Changes in hair: No  Changes in moles/birth marks: Yes  Itching: Yes  Rashes: No  Changes in nails: Yes  Acne: Yes  Hair in places you don't want it: No  Change in facial hair: No  Warts: No  Non-healing sores: Yes  Scarring: Yes  Flaking of skin: No  Color changes of hands/feet in cold : No  Sun sensitivity: No  Skin thickening: No  Eye pain: Yes  Dry eyes: Yes  Watery eyes: No  Eye bulging: No  Double vision: No  Flashing of lights: No  Spots: No  Floaters: Yes  Redness: Yes  Crossed eyes: No  Tunnel Vision: No  Yellowing of eyes: No  Eye irritation:  Yes  Chest pain or pressure: No  Fast or irregular heartbeat: Yes  Pain in legs with walking: No  Trouble breathing while lying down: No  Fingers or toes appear blue: No  High blood pressure: No  Low blood pressure: No  Fainting: No  Murmurs: No  Pacemaker: No  Varicose veins: No  Wake up at night with shortness of breath: No  Light-headedness: No  Exercise intolerance: No  Heart burn or indigestion: Yes  Nausea: Yes  Vomiting: No  Abdominal pain: No  Bloating: No  Constipation: No  Diarrhea: No  Blood in stool: No  Black stools: No  Rectal or Anal pain: Yes  Fecal incontinence: No  Yellowing of skin or eyes: No  Vomit with blood: No  Change in stools: No  Trouble holding urine or incontinence: No  Pain or burning: No  Trouble starting or stopping: No  Increased frequency of urination: Yes  Blood in urine: No  Decreased frequency of urination: No  Frequent nighttime urination: Yes  Flank pain: No  Difficulty emptying bladder: No  Bleeding or spotting between periods: No  Heavy or painful periods: Yes  Irregular periods: No  Vaginal discharge: No  Hot flashes: Yes  Vaginal dryness: No  Genital ulcers: No  Reduced libido: No  Painful intercourse: No  Difficulty with sexual arousal: No  Post-menopausal bleeding: No  Back pain: Yes  Muscle aches: Yes  Neck pain: Yes  Swollen joints: No  Joint pain: No  Bone pain: No  Muscle cramps: Yes  Muscle weakness: Yes  Joint stiffness: No  Bone fracture: No  Edema or swelling: No  Anemia: No  Swollen glands: Yes  Easy bleeding or bruising: No  Trouble with coordination: Yes  Dizziness or trouble with balance: Yes  Fainting or black-out spells: No  Memory loss: Yes  Headache: Yes  Seizures: No  Speech problems: No  Tingling: Yes  Tremor: No  Weakness: No  Difficulty walking: No  Paralysis: No  Numbness: No  Nervous or Anxious: No  Depression: No  Trouble sleeping: Yes  Trouble thinking or concentrating: Yes  Mood changes: Yes  Panic attacks: No    49 minutes spent on the date of  the encounter doing chart review, history and exam, documentation and further activities as noted above.

## 2022-04-05 NOTE — PROGRESS NOTES
Nithya Tolentino  is being evaluated via a billable video visit.      How would you like to obtain your AVS? MyChart  For the video visit, send the invitation by: Send to e-mail at: [email or web address]  Will anyone else be joining your video visit? No

## 2022-04-05 NOTE — LETTER
4/5/2022         RE: Nithya Tolentino  [address] [facility] [id number]        Dear Colleague,    Thank you for referring your patient, Nithya Tolentino, to the [facility]. Please see a copy of my visit note below.    Video-Visit Details    Type of service:  Video Visit    Video call duration:  Started: 8:14 am   Ended: 9:00 am     Originating Location (pt. Location): Home  Distant Location (provider location):  [facility]   Platform used for Video Visit: AmWell    Due to the COVID 19 pandemic this visit was converted to a video visit in order to help prevent spread of infection in this patient and the general population.     The patient is seen in consultation at the request of Dr. Dede Marcelo.     Nithya Tolentino is a 46 year old female with a past medical history significant for restless leg syndrome, allergies, dysmenorrhea, referred for evaluation of abnormal TSH.    On prior lab work, the TSH has been either minimally suppressed or in the lower half of normal range since 2013.  There were transient episodes when the TSH was mildly low, in 2015, 2016, 2017 and, more recently, on 3/23/2022, when it was 0.33, associated with a normal free T4 of 1.39.  The TSH spontaneously normalized to 0.54, on 3/28/2022.  Prior free T4 levels have always been normal.  Thyroid peroxidase antibodies were detectable but below threshold at 35, on 1/25/2022.    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 As per patient, the neck lymph nodes have been enlarged since December 2021.    The neck ultrasound from 3/24/2022, done for evaluation of cervical lymphadenopathy, revealed the following findings: Right thyroid lobe measuring 5.1 x 1.8 x 2 cm, left thyroid lobe measuring 4.8 x 1.6 x 1.7 cm, with an isthmus of 3 mm.  The echotexture was fairly homogeneous.  2 thyroid nodules were described, one located in the right superior pole measuring 0.6 cm, hypoechoic  and 1 located in the inferior right thyroid pole, measuring 1.1 cm, iso/hyperechoic.  I reviewed the ultrasound images.  The vascularity of the gland is not increased.  The right inferior thyroid nodule is hard to delineate.  There are no abnormal looking lymph nodes.    Other pertinent labs:  3/20/22 CRP elevated at 8.4. it was normal in 1/2022 1/21/22 ferritin 103   Screening for RA negative 1/2022  Tissue transglutaminase antibodies negative in 2012, 2016 at 2022  Elevated B12 in January 2022  Normal vitamin D levels in 2013 in 2015  Elevated MCH on recent CBC from 3/23/2022    Past Medical History   Past Medical History:   Diagnosis Date     Dysmenorrhea      Environmental allergies    Sinusitis  Skin dermatofibroma  Ulnar tunnel syndrome   CTS   LBP - Degenerative changes of the lumbar spine  Restless leg syndrome     Past Surgical History   Past Surgical History:   Procedure Laterality Date     NO HISTORY OF SURGERY     [place] teeth surgery     Current Medications    Current Outpatient Medications:      acetaminophen (TYLENOL) 500 MG tablet, Take 1-2 tablets by mouth every 6 hours as needed. 2 tablets before bedtime, Disp: , Rfl:      AUROVELA FE 1/20 1-20 MG-MCG tablet, TAKE 1 TABLET BY MOUTH DAILY, Disp: 84 tablet, Rfl: 1     azelastine (ASTELIN) 0.1 % nasal spray, USE 2 SPRAYS IN EACH NOSTRIL TWICE DAILY, Disp: 30 mL, Rfl: 1     cetirizine (ZYRTEC) 10 MG tablet, Take 1 tablet (10 mg) by mouth 2 times daily, Disp: 60 tablet, Rfl: 0     fish oil-omega-3 fatty acids 1000 MG capsule, Take 2 g by mouth daily , Disp: , Rfl:      fluticasone (FLONASE) 50 MCG/ACT nasal spray, Spray 1 spray into both nostrils daily as needed for rhinitis or allergies , Disp: , Rfl:      hydrOXYzine (VISTARIL) 25 MG capsule, Take 1-2 capsules (25-50 mg) by mouth 3 times daily as needed for itching (Patient not taking: Reported on 3/28/2022), Disp: 45 capsule, Rfl: 0     mometasone (NASONEX) 50 MCG/ACT nasal spray, Spray 2 sprays  "into both nostrils daily, Disp: 17 g, Rfl: 11     naproxen (NAPROSYN) 500 MG tablet, Take 1 tablet (500 mg) by mouth 2 times daily as needed for moderate pain, Disp: 60 tablet, Rfl: 3     ranitidine (ZANTAC) 150 MG capsule, Take 1 capsule (150 mg) by mouth 2 times daily, Disp: 90 capsule, Rfl: 0     RESTASIS 0.05 % ophthalmic emulsion, INSTILL 1 DROP IN BOTH EYES TWICE DAILY, Disp: , Rfl:     Family History   Problem Relation Age of Onset     Thyroid Disease - hypothyroidism  Mother      Dementia Mother      Hypertension Mother      Anesthesia Reaction Father         difficulty coming out     Arthritis Maternal Grandmother         osteoarthritis      Cancer Maternal Grandfather         bone     Cerebrovascular Disease Maternal Grandfather         Passed in 1997     Neurologic Disorder Paternal Grandmother         brain tumor - benign      Hypertension Paternal Grandmother         Passed in 2007 after brain tumor surgery     Gastrointestinal Disease Other         cousin on dads side has celiac   Maternal aunt who had thyroid disease, multiple myeloma and amyloidosis. Another maternal aunt had part of the thyroid surgically removed.     Social History  .  She has 2 adopted children. Both have mental issues.  She denies smoking, drinking alcohol or using illicit drugs. Occupation: mental health therapist.     Review of Systems   Systemic:             Fatigue present for many years; sleeps 7 hrs everyday in the 90s, and at least twice a night paraspinous areasly and wakes up every 19 minutes to 1 hr due to restlessness, uses the restroom twice a night; doesn't wake up rested in the morning; once a week she wakes up with a headache. Weight fairly stable on our records   Eye:                      Dry eyes for 5 years; re stasis helps; eyes are itchy and red; hard to focus, no double vision   Vishal-Laryngeal:     No dysphagia, some hoarseness since being dx with EB; no cough; \"globus pallidus\" present intermittently " for the last month      Cardiovascular:    H/o heart palpitations 5 years ago; twice a week in the last 6 weeks she has had rapid rhythm palpitations, seconds duration; no CP    Pulmonary:           No pulmonary symptoms  Gastrointestinal:   Longstanding constipation  Genitourinary:       increased urination; urinates twice a night for the last 5 years   Endocrine:            cold intolerance noted during wintertime; feeling warmer recently; MP regular on BCP; have been fairly regular prior to staring the BCP; no significantly heavy bleedings but painful menstrual periods   Neurological:        Diffuse musculoskeletal pain since August 2021 (feels like she has the flu) - got worse on 3/15 and improved when the pruritis got worse and is now significantly better.   longstanding headaches; at times she has a tremor - mainly when hungry; numbness and tingling sensation in her hands and feet - for the last month; episodes of vertigo present ~ once a week and short lived. She is scheduled to see neurology.   Musculoskeletal:  Low back joint pain; left big toe pain due to hallux    Skin:                    Chronic dry skin, no hair falling out   Psychological:     Anxiety - longstanding; saw a therapist 1 year ago and improved significantly      Vital Signs     Previous Weights:    Wt Readings from Last 10 Encounters:   03/29/22 81.5 kg (179 lb 12 oz)   03/28/22 81.2 kg (179 lb)   03/24/22 82.6 kg (182 lb)   03/23/22 82.9 kg (182 lb 12.8 oz)   01/21/22 84.4 kg (186 lb)   03/30/21 80.3 kg (177 lb)   11/03/20 80.5 kg (177 lb 8 oz)   12/05/19 80.5 kg (177 lb 8 oz)   10/17/19 79.8 kg (176 lb)   10/03/19 79.8 kg (176 lb)        LMP 03/15/2022 (Exact Date)     Physical Exam  General Appearance: alert, no distress noted   Eyes: grossly normal to inspection, conjunctivae and sclerae normal, no lid lag or stare   Respiratory: no audible wheeze, cough, or visible cyanosis.  No visible retractions or increased work of breathing.   Able to speak fully in complete sentences.  Neurological: Cranial nerves grossly intact, mentation intact and speech normal; no tremor of the hands   Skin: no lesions on exposed skin   Psychological: mentation appears normal, affect normal, judgement and insight intact, normal speech and appearance well-groomed    Lab Results  I reviewed prior lab results documented in Epic.  TSH   Date Value Ref Range Status   03/28/2022 0.54 0.40 - 4.00 mU/L Final   03/23/2022 0.33 (L) 0.40 - 4.00 mU/L Final   01/21/2022 0.43 0.40 - 4.00 mU/L Final   07/27/2021 0.71 0.40 - 4.00 mU/L Final   12/05/2019 0.65 0.40 - 4.00 mU/L Final   12/13/2018 0.66 0.40 - 4.00 mU/L Final   12/21/2017 0.40 0.40 - 4.00 mU/L Final   03/06/2017 0.31 (L) 0.40 - 4.00 mU/L Final   01/05/2017 0.37 (L) 0.40 - 4.00 mU/L Final       Assessment     Nithya Tolentino is a 46 year old female with a past medical history significant for restless leg syndrome, allergies, dysmenorrhea, referred for evaluation of abnormal TSH.     1. Low normal TSH   The patient has been having low normal or minimally suppressed TSH values since 2013.  Prior free T4 levels have been normal.  Most recent TSH normalized at 0.54 on 3/28/2022, after being suppressed at 0.33, 5 days prior, in the context of recent infection w Benitez-Bar virus.     Differential diagnosis: Most likely subacute thyroiditis due to viral infection, less likely subclinical Graves' disease.  Of note that she does have a history of dry and itchy eyes, but no proptosis.  There is also a strong family history of hypothyroidism.    I have counseled the patient on signs and symptoms of hypo and hyperthyroidism, the spontaneous resolutions of mild hyperthyroidism following viral infections.  As long as the TSH remains in the normal range or at the lower end of normal range, no treatment is indicated.     Plan:   F/up TSI   Recheck TFTs in 6 months     2.  Thyroid nodules.  She has no risk factors for thyroid cancer.   Discussed about high prevalence of thyroid nodules in general population, their risk of cancer of 5-8% and the overall good prognosis (low mortality) of thyroid cancer.  Considering the ultrasound appearance of the thyroid nodules, I recommended a follow-up ultrasound in 6 months.    The patient has many other symptoms and I suspect most of them are related to the recent viral infection.    Orders Placed This Encounter   Procedures     US Thyroid     Thyroid stimulating immunoglobulin     TSH     T4 free     Answers for HPI/ROS submitted by the patient on 4/4/2022  General Symptoms: Yes  Skin Symptoms: Yes  HENT Symptoms: Yes  EYE SYMPTOMS: Yes  HEART SYMPTOMS: Yes  LUNG SYMPTOMS: No  INTESTINAL SYMPTOMS: Yes  URINARY SYMPTOMS: Yes  GYNECOLOGIC SYMPTOMS: Yes  BREAST SYMPTOMS: No  SKELETAL SYMPTOMS: Yes  BLOOD SYMPTOMS: Yes  NERVOUS SYSTEM SYMPTOMS: Yes  MENTAL HEALTH SYMPTOMS: Yes  Ear pain: No  Ear discharge: No  Hearing loss: No  Tinnitus: Yes  Nosebleeds: No  Congestion: Yes  Sinus pain: Yes  Trouble swallowing: No   Voice hoarseness: No  Mouth sores: No  Tooth pain: No  Gum tenderness: Yes  Bleeding gums: No  Change in taste: Yes  Change in sense of smell: No  Dry mouth: Yes  Hearing aid used: No  Neck lump: No  Fever: No  Loss of appetite: Yes  Weight loss: Yes  Fatigue: Yes  Night sweats: Yes  Chills: No  Increased stress: No  Excessive hunger: No  Excessive thirst: No  Feeling hot or cold when others believe the temperature is normal: Yes  Loss of height: No  Post-operative complications: No  Surgical site pain: No  Hallucinations: No  Change in or Loss of Energy: Yes  Hyperactivity: No  Confusion: No  Changes in hair: No  Changes in moles/birth marks: Yes  Itching: Yes  Rashes: No  Changes in nails: Yes  Acne: Yes  Hair in places you don't want it: No  Change in facial hair: No  Warts: No  Non-healing sores: Yes  Scarring: Yes  Flaking of skin: No  Color changes of hands/feet in cold : No  Sun sensitivity:  No  Skin thickening: No  Eye pain: Yes  Dry eyes: Yes  Watery eyes: No  Eye bulging: No  Double vision: No  Flashing of lights: No  Spots: No  Floaters: Yes  Redness: Yes  Crossed eyes: No  Tunnel Vision: No  Yellowing of eyes: No  Eye irritation: Yes  Chest pain or pressure: No  Fast or irregular heartbeat: Yes  Pain in legs with walking: No  Trouble breathing while lying down: No  Fingers or toes appear blue: No  High blood pressure: No  Low blood pressure: No  Fainting: No  Murmurs: No  Pacemaker: No  Varicose veins: No  Wake up at night with shortness of breath: No  Light-headedness: No  Exercise intolerance: No  Heart burn or indigestion: Yes  Nausea: Yes  Vomiting: No  Abdominal pain: No  Bloating: No  Constipation: No  Diarrhea: No  Blood in stool: No  Black stools: No  Rectal or Anal pain: Yes  Fecal incontinence: No  Yellowing of skin or eyes: No  Vomit with blood: No  Change in stools: No  Trouble holding urine or incontinence: No  Pain or burning: No  Trouble starting or stopping: No  Increased frequency of urination: Yes  Blood in urine: No  Decreased frequency of urination: No  Frequent nighttime urination: Yes  Flank pain: No  Difficulty emptying bladder: No  Bleeding or spotting between periods: No  Heavy or painful periods: Yes  Irregular periods: No  Vaginal discharge: No  Hot flashes: Yes  Vaginal dryness: No  Genital ulcers: No  Reduced libido: No  Painful intercourse: No  Difficulty with sexual arousal: No  Post-menopausal bleeding: No  Back pain: Yes  Muscle aches: Yes  Neck pain: Yes  Swollen joints: No  Joint pain: No  Bone pain: No  Muscle cramps: Yes  Muscle weakness: Yes  Joint stiffness: No  Bone fracture: No  Edema or swelling: No  Anemia: No  Swollen glands: Yes  Easy bleeding or bruising: No  Trouble with coordination: Yes  Dizziness or trouble with balance: Yes  Fainting or black-out spells: No  Memory loss: Yes  Headache: Yes  Seizures: No  Speech problems: No  Tingling: Yes  Tremor:  No  Weakness: No  Difficulty walking: No  Paralysis: No  Numbness: No  Nervous or Anxious: No  Depression: No  Trouble sleeping: Yes  Trouble thinking or concentrating: Yes  Mood changes: Yes  Panic attacks: No    49 minutes spent on the date of the encounter doing chart review, history and exam, documentation and further activities as noted above.      Nithya Tolentino  is being evaluated via a billable video visit.      How would you like to obtain your AVS? MyChart  For the video visit, send the invitation by: Send to e-mail at: [email or web address]  Will anyone else be joining your video visit? No            Again, thank you for allowing me to participate in the care of your patient.        Sincerely,        Myra Amador MD

## 2022-04-05 NOTE — NURSING NOTE
Pt state she is also taking magnesium 200mg, Silenium 200mg, Vitamin D3 4,000iu, Zinc 50mg, and Vitamin B12 3,000mcg. Marie Chappell on 4/5/2022 at 7:54 AM

## 2022-04-06 ENCOUNTER — TELEPHONE (OUTPATIENT)
Dept: ENDOCRINOLOGY | Facility: CLINIC | Age: 47
End: 2022-04-06
Payer: COMMERCIAL

## 2022-04-06 NOTE — TELEPHONE ENCOUNTER
Per Dr. Amador   - Return in about 6 months (around 10/5/2022) for labs prior to f/up apt, ultrasound in 6 months, nonfasting labs.    Left VM and endocrine number for pt to return call.     Taylor Myhre,VF

## 2022-04-08 NOTE — PROGRESS NOTES
RECORDS STATUS - ALL OTHER DIAGNOSIS      RECORDS RECEIVED FROM: [facility]   DATE RECEIVED: 6/7/2022   NOTES STATUS DETAILS   OFFICE NOTE from referring provider Complete Epic  Dede Marcelo APRN CNP   DISCHARGE SUMMARY from hospital     DISCHARGE REPORT from the ER     MEDICATION LIST Complete [facility]   CLINICAL TRIAL TREATMENTS TO DATE     LABS     PATHOLOGY REPORTS     ANYTHING RELATED TO DIAGNOSIS Complete Labs last updated on 4/5/2022   GENONOMIC TESTING     TYPE:     IMAGING (NEED IMAGES & REPORT)     CT SCANS Complete CT Chest Abdomen 3/24/2022 more in PACS   MRI     MAMMO     ULTRASOUND Complete US Thyroid 3/24/2022    US Head Neck 3/24/2022    US abdomen Limited 3/20/2022   PET

## 2022-04-12 NOTE — TELEPHONE ENCOUNTER
RECORDS RECEIVED FROM: Internal   DATE RECEIVED: 05.10.2022   NOTES (Gather within 2 years) STATUS DETAILS   OFFICE NOTE from referring provider   Internal 03.25.2022 Dede Marcelo APRN CNP   LABS (any labs) Internal    MEDICATION LIST Internal

## 2022-04-13 ENCOUNTER — MYC MEDICAL ADVICE (OUTPATIENT)
Dept: FAMILY MEDICINE | Facility: OTHER | Age: 47
End: 2022-04-13
Payer: COMMERCIAL

## 2022-04-14 NOTE — TELEPHONE ENCOUNTER
Responded via Vontu.       WARNER Pro CNP  Questions or concerns please feel free to send me a Vontu message or call me  Phone : [phone]

## 2022-04-20 NOTE — PROGRESS NOTES
[facility] Dermatology Note  Encounter Date: Apr 26, 2022  Office Visit     Dermatology Problem List:  1. 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 Family history of melanoma in a cousin  3. DF, left lower leg s/p shave removal 2/19/2021  ____________________________________________    Assessment & Plan:    # Folliculitis, buttocks  -BP wash once daily  -Clindamycin once daily for 3 weeks  -call if not resolving    #DF, has returned, recommend excision appt if she wants to completely remove    Procedures Performed:   NA    Follow-up: 1 year(s) in-person, or earlier for new or changing lesions    Staff and Scribe:     Scribe Disclosure:   IGustavo, am serving as a scribe to document services personally performed by this physician, Dr. Lexie Hernandez, based on data collection and the provider's statements to me.     Lexie Hernandez MD    Department of Dermatology  [facility]: Phone: [phone], Fax:[phone]  [facility]: Phone: [phone], Fax: [phone]      ____________________________________________    CC: Skin Check (FBS: recheck left lateral lower leg. Check non-healing lesion on right buttock. No personal Hx of skin cancer. )    HPI:  Ms. Nithya Tolentino is a(n) 46 year old female who presents today as a return patient for a skin check.     Last seen on 2/19/21 for a skin check. At that time, a bx was performed on the left lower leg.     Today, patient presents for a recheck on the left lateral thigh. Notes a nonhealing lesion on the right buttock.     Patient is otherwise feeling well, without additional skin concerns.    Labs Reviewed:  N/A    Physical Exam:  Vitals: LMP 03/15/2022 (Exact Date)   SKIN: Total skin excluding the gentital areas was performed. The exam included the head/face, neck, both arms, chest, back, abdomen, both  legs, digits and/or nails. Declines genital exam. Yuliya Roach LPN prsent for buttocks exam  - Well healed scar at site of prior DF  - Follicularly based papule on the buttocks less than 10 lesions  - No other lesions of concern on areas examined.     Medications:  Current Outpatient Medications   Medication     acetaminophen (TYLENOL) 500 MG tablet     AUROVELA FE 1/20 1-20 MG-MCG tablet     azelastine (ASTELIN) 0.1 % nasal spray     fish oil-omega-3 fatty acids 1000 MG capsule     fluticasone (FLONASE) 50 MCG/ACT nasal spray     mometasone (NASONEX) 50 MCG/ACT nasal spray     ranitidine (ZANTAC) 150 MG capsule     RESTASIS 0.05 % ophthalmic emulsion     No current facility-administered medications for this visit.      Past Medical History:   Patient Active Problem List   Diagnosis     Seasonal allergic rhinitis     Dysmenorrhea     CARDIOVASCULAR SCREENING; LDL GOAL LESS THAN 160     Restless legs syndrome (RLS)     Family history of supraventricular tachycardia     Other chronic sinusitis     Rhinosinusitis     Premenopausal menorrhagia     Acquired hallux limitus of left foot     Past Medical History:   Diagnosis Date     Dysmenorrhea      Environmental allergies         CC No referring provider defined for this encounter. on close of this encounter.

## 2022-04-22 NOTE — PROGRESS NOTES
Telephone visit.     Assessment & Plan     Cough  - Started on Saturday, intermittent. No SOB  - Recommend covid-19 testing given symptoms and others around her are also ill. Recommend PCR today.   - Symptomatic; Yes; 4/23/2022 COVID-19 Virus (Coronavirus) by PCR Nose  - CBC with platelets and differential; Future  - CBC with platelets and differential    Nasal congestion  As noted above   - Symptomatic; Yes; 4/23/2022 COVID-19 Virus (Coronavirus) by PCR Nose  - CBC with platelets and differential; Future  - CBC with platelets and differential    EBV (Benitez-Barr virus) viremia  Follow up with infectious disease given her ongoing symptoms and concerns. Now having intermittent rashes. Had some bleeding in her gums, cbc is normal today. She has good dental hygiene. She notes excessive fatigue so we will also recheck her CMP today as well. Continue with plan for infectious disease. She wanted her antibodies recheck today.   - EBV Capsid Antibody IgG; Future  - EBV Capsid Antibody IgM; Future  - EBV Nuclear Antigen EBNA Antibody IgG; Future  - EBV Antibody to Early Antigen IgG; Future  - Comprehensive metabolic panel (BMP + Alb, Alk Phos, ALT, AST, Total. Bili, TP); Future  - Comprehensive metabolic panel (BMP + Alb, Alk Phos, ALT, AST, Total. Bili, TP)  - EBV Antibody to Early Antigen IgG  - EBV Nuclear Antigen EBNA Antibody IgG  - EBV Capsid Antibody IgM  - EBV Capsid Antibody IgG          There are no Patient Instructions on file for this visit.    No follow-ups on file.    WARNER Pro CNP  [facility] MELANI Stokes is a 46 year old who presents for the following health issues {    History of Present Illness       Mental Health Follow-up:                    Today's PHQ-9         PHQ-9 Total Score: 14  PHQ-9 Q9 Thoughts of better off dead/self-harm past 2 weeks :   (P) Not at all    How difficult have these problems made it for you to do your work, take care of things at home,  or get along with other people: Very difficult        Reason for visit:  EBV infection follow up  Symptom onset:  More than a month  Symptoms include:  Fatigue, constipation, bleeding gums, headache  Symptom intensity:  Moderate  Symptom progression:  Staying the same  Had these symptoms before:  Yes  Has tried/received treatment for these symptoms:  Yes  Previous treatment was successful:  No  What makes it worse:  Stress, lack of sleep/rest.  What makes it better:  Rest/sleep    She eats 2-3 servings of fruits and vegetables daily.She consumes 1 sweetened beverage(s) daily.She exercises with enough effort to increase her heart rate 9 or less minutes per day.  She exercises with enough effort to increase her heart rate 3 or less days per week.   She is taking medications regularly.        Cough started Saturday  Sore throat, Sunday worse yesterday and better today   has head cold and vomiting.   No fever.       Has EBV- May 10th for infectious disease.     Most of her acute EBV symptoms have been gone. The Fatigue and the brain fog is still present and not getting any better. Two weeks ago this past Sunday she went to her [facility] celebration which is an entire day, had to leave early because she was so exhausted from being there. The next day she woke up she had swollen glands, sore throat, headache and took a day off of work just to rest. She reduced her work hours to half.   Good night she will get 7 hours and this is not her typical.     She did not have covid that she is aware of. She tested for covid three times during this time as her family had it in November. She is curious if she did have covid and EBV was activated and causing long term symptoms.     Intermittent gum bleeding.   Hand rash, went away   Constipated     Review of Systems         Objective    BP (!) 141/81 (BP Location: Right arm)   Pulse 79   Temp 98.1  F (36.7  C) (Oral)   Wt 82.8 kg (182 lb 9.6 oz)   LMP 03/15/2022 (Exact  Date)   SpO2 97%   BMI 28.59 kg/m    Body mass index is 28.59 kg/m .  Physical Exam   GENERAL: fatigued  PSYCH: mentation appears normal, affect normal/bright    Results for orders placed or performed in visit on 04/25/22 (from the past 24 hour(s))   CBC with platelets and differential    Narrative    The following orders were created for panel order CBC with platelets and differential.  Procedure                               Abnormality         Status                     ---------                               -----------         ------                     CBC with platelets and d...[[id number]]                      Final result                 Please view results for these tests on the individual orders.   CBC with platelets and differential   Result Value Ref Range    WBC Count 6.9 4.0 - 11.0 10e3/uL    RBC Count 4.98 3.80 - 5.20 10e6/uL    Hemoglobin 14.9 11.7 - 15.7 g/dL    Hematocrit 45.0 35.0 - 47.0 %    MCV 90 78 - 100 fL    MCH 29.9 26.5 - 33.0 pg    MCHC 33.1 31.5 - 36.5 g/dL    RDW 13.0 10.0 - 15.0 %    Platelet Count 232 150 - 450 10e3/uL    % Neutrophils 53 %    % Lymphocytes 38 %    % Monocytes 8 %    % Eosinophils 1 %    % Basophils 0 %    Absolute Neutrophils 3.6 1.6 - 8.3 10e3/uL    Absolute Lymphocytes 2.6 0.8 - 5.3 10e3/uL    Absolute Monocytes 0.5 0.0 - 1.3 10e3/uL    Absolute Eosinophils 0.1 0.0 - 0.7 10e3/uL    Absolute Basophils 0.0 0.0 - 0.2 10e3/uL         Telephone visit 15 minutes with PCR swab in clinic.

## 2022-04-25 ENCOUNTER — VIRTUAL VISIT (OUTPATIENT)
Dept: FAMILY MEDICINE | Facility: OTHER | Age: 47
End: 2022-04-25
Payer: COMMERCIAL

## 2022-04-25 VITALS
OXYGEN SATURATION: 97 % | TEMPERATURE: 98.1 F | BODY MASS INDEX: 28.59 KG/M2 | WEIGHT: 182.6 LBS | SYSTOLIC BLOOD PRESSURE: 141 MMHG | HEART RATE: 79 BPM | DIASTOLIC BLOOD PRESSURE: 81 MMHG

## 2022-04-25 DIAGNOSIS — B27.00 EBV (EPSTEIN-BARR VIRUS) VIREMIA: ICD-10-CM

## 2022-04-25 DIAGNOSIS — R09.81 NASAL CONGESTION: ICD-10-CM

## 2022-04-25 DIAGNOSIS — R05.9 COUGH: Primary | ICD-10-CM

## 2022-04-25 PROBLEM — B27.90 EPSTEIN-BARR VIRUS INFECTION: Status: ACTIVE | Noted: 2022-03-17

## 2022-04-25 PROBLEM — G56.23 ULNAR NEUROPATHY OF BOTH UPPER EXTREMITIES: Status: ACTIVE | Noted: 2019-01-31

## 2022-04-25 LAB
ALBUMIN SERPL-MCNC: 3.7 G/DL (ref 3.4–5)
ALP SERPL-CCNC: 60 U/L (ref 40–150)
ALT SERPL W P-5'-P-CCNC: 26 U/L (ref 0–50)
ANION GAP SERPL CALCULATED.3IONS-SCNC: 3 MMOL/L (ref 3–14)
AST SERPL W P-5'-P-CCNC: 22 U/L (ref 0–45)
BASOPHILS # BLD AUTO: 0 10E3/UL (ref 0–0.2)
BASOPHILS NFR BLD AUTO: 0 %
BILIRUB SERPL-MCNC: 0.6 MG/DL (ref 0.2–1.3)
BUN SERPL-MCNC: 10 MG/DL (ref 7–30)
CALCIUM SERPL-MCNC: 9.5 MG/DL (ref 8.5–10.1)
CHLORIDE BLD-SCNC: 107 MMOL/L (ref 94–109)
CO2 SERPL-SCNC: 29 MMOL/L (ref 20–32)
CREAT SERPL-MCNC: 0.87 MG/DL (ref 0.52–1.04)
EOSINOPHIL # BLD AUTO: 0.1 10E3/UL (ref 0–0.7)
EOSINOPHIL NFR BLD AUTO: 1 %
ERYTHROCYTE [DISTWIDTH] IN BLOOD BY AUTOMATED COUNT: 13 % (ref 10–15)
GFR SERPL CREATININE-BSD FRML MDRD: 83 ML/MIN/1.73M2
GLUCOSE BLD-MCNC: 146 MG/DL (ref 70–99)
HCT VFR BLD AUTO: 45 % (ref 35–47)
HGB BLD-MCNC: 14.9 G/DL (ref 11.7–15.7)
LYMPHOCYTES # BLD AUTO: 2.6 10E3/UL (ref 0.8–5.3)
LYMPHOCYTES NFR BLD AUTO: 38 %
MCH RBC QN AUTO: 29.9 PG (ref 26.5–33)
MCHC RBC AUTO-ENTMCNC: 33.1 G/DL (ref 31.5–36.5)
MCV RBC AUTO: 90 FL (ref 78–100)
MONOCYTES # BLD AUTO: 0.5 10E3/UL (ref 0–1.3)
MONOCYTES NFR BLD AUTO: 8 %
NEUTROPHILS # BLD AUTO: 3.6 10E3/UL (ref 1.6–8.3)
NEUTROPHILS NFR BLD AUTO: 53 %
PLATELET # BLD AUTO: 232 10E3/UL (ref 150–450)
POTASSIUM BLD-SCNC: 4.2 MMOL/L (ref 3.4–5.3)
PROT SERPL-MCNC: 7.5 G/DL (ref 6.8–8.8)
RBC # BLD AUTO: 4.98 10E6/UL (ref 3.8–5.2)
SODIUM SERPL-SCNC: 139 MMOL/L (ref 133–144)
WBC # BLD AUTO: 6.9 10E3/UL (ref 4–11)

## 2022-04-25 PROCEDURE — 99214 OFFICE O/P EST MOD 30 MIN: CPT | Performed by: NURSE PRACTITIONER

## 2022-04-25 PROCEDURE — 36415 COLL VENOUS BLD VENIPUNCTURE: CPT | Performed by: NURSE PRACTITIONER

## 2022-04-25 PROCEDURE — 86663 EPSTEIN-BARR ANTIBODY: CPT | Performed by: NURSE PRACTITIONER

## 2022-04-25 PROCEDURE — 80053 COMPREHEN METABOLIC PANEL: CPT | Performed by: NURSE PRACTITIONER

## 2022-04-25 PROCEDURE — 85025 COMPLETE CBC W/AUTO DIFF WBC: CPT | Performed by: NURSE PRACTITIONER

## 2022-04-25 PROCEDURE — 96127 BRIEF EMOTIONAL/BEHAV ASSMT: CPT | Performed by: NURSE PRACTITIONER

## 2022-04-25 PROCEDURE — 86664 EPSTEIN-BARR NUCLEAR ANTIGEN: CPT | Performed by: NURSE PRACTITIONER

## 2022-04-25 PROCEDURE — U0005 INFEC AGEN DETEC AMPLI PROBE: HCPCS | Performed by: NURSE PRACTITIONER

## 2022-04-25 PROCEDURE — 86665 EPSTEIN-BARR CAPSID VCA: CPT | Mod: 59 | Performed by: NURSE PRACTITIONER

## 2022-04-25 PROCEDURE — 86665 EPSTEIN-BARR CAPSID VCA: CPT | Performed by: NURSE PRACTITIONER

## 2022-04-25 PROCEDURE — U0003 INFECTIOUS AGENT DETECTION BY NUCLEIC ACID (DNA OR RNA); SEVERE ACUTE RESPIRATORY SYNDROME CORONAVIRUS 2 (SARS-COV-2) (CORONAVIRUS DISEASE [COVID-19]), AMPLIFIED PROBE TECHNIQUE, MAKING USE OF HIGH THROUGHPUT TECHNOLOGIES AS DESCRIBED BY CMS-2020-01-R: HCPCS | Performed by: NURSE PRACTITIONER

## 2022-04-25 ASSESSMENT — PAIN SCALES - GENERAL: PAINLEVEL: MILD PAIN (2)

## 2022-04-25 ASSESSMENT — PATIENT HEALTH QUESTIONNAIRE - PHQ9
SUM OF ALL RESPONSES TO PHQ QUESTIONS 1-9: 14
10. IF YOU CHECKED OFF ANY PROBLEMS, HOW DIFFICULT HAVE THESE PROBLEMS MADE IT FOR YOU TO DO YOUR WORK, TAKE CARE OF THINGS AT HOME, OR GET ALONG WITH OTHER PEOPLE: VERY DIFFICULT
SUM OF ALL RESPONSES TO PHQ QUESTIONS 1-9: 14

## 2022-04-26 ENCOUNTER — OFFICE VISIT (OUTPATIENT)
Dept: DERMATOLOGY | Facility: CLINIC | Age: 47
End: 2022-04-26
Payer: COMMERCIAL

## 2022-04-26 DIAGNOSIS — D23.9 DERMATOFIBROMA: ICD-10-CM

## 2022-04-26 DIAGNOSIS — L73.9 FOLLICULITIS: Primary | ICD-10-CM

## 2022-04-26 LAB
EBV EA-D IGG SER-ACNC: 8.3 U/ML (ref 0–9)
EBV EA-D IGG SER-ACNC: NORMAL
EBV NA IGG SER IA-ACNC: <3 U/ML
EBV NA IGG SER IA-ACNC: NORMAL [IU]/ML
EBV VCA IGG SER IA-ACNC: 130 U/ML
EBV VCA IGG SER IA-ACNC: POSITIVE
EBV VCA IGM SER IA-ACNC: 113 U/ML
EBV VCA IGM SER IA-ACNC: ABNORMAL
SARS-COV-2 RNA RESP QL NAA+PROBE: NEGATIVE

## 2022-04-26 PROCEDURE — 99213 OFFICE O/P EST LOW 20 MIN: CPT | Performed by: DERMATOLOGY

## 2022-04-26 RX ORDER — CLINDAMYCIN PHOSPHATE 10 UG/ML
LOTION TOPICAL 2 TIMES DAILY
Qty: 60 ML | Refills: 0 | Status: SHIPPED | OUTPATIENT
Start: 2022-04-26 | End: 2022-08-08

## 2022-04-26 RX ORDER — NAPROXEN 250 MG/1
250 TABLET ORAL 2 TIMES DAILY WITH MEALS
COMMUNITY

## 2022-04-26 ASSESSMENT — PATIENT HEALTH QUESTIONNAIRE - PHQ9: SUM OF ALL RESPONSES TO PHQ QUESTIONS 1-9: 14

## 2022-04-26 ASSESSMENT — PAIN SCALES - GENERAL: PAINLEVEL: NO PAIN (1)

## 2022-04-26 NOTE — LETTER
4/26/2022         RE: Nithya Tolentino  [address] [facility] [address]        Dear Colleague,    Thank you for referring your patient, Nithya Tolentino, to the [facility]. Please see a copy of my visit note below.    [facility] Dermatology Note  Encounter Date: Apr 26, 2022  Office Visit     Dermatology Problem List:  1. 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 Family history of melanoma in a cousin  3. DF, left lower leg s/p shave removal 2/19/2021  ____________________________________________    Assessment & Plan:    # Folliculitis, buttocks  -BP wash once daily  -Clindamycin once daily for 3 weeks  -call if not resolving    #DF, has returned, recommend excision appt if she wants to completely remove    Procedures Performed:   NA    Follow-up: 1 year(s) in-person, or earlier for new or changing lesions    Staff and Scribe:     Scribe Disclosure:   I, Gustavo Travis, am serving as a scribe to document services personally performed by this physician, Dr. Lexie Hernandez, based on data collection and the provider's statements to me.     Lexie Hernandez MD    Department of Dermatology  [facility]: Phone: [phone], Fax:[phone]  [facility]: Phone: [phone], Fax: [phone]      ____________________________________________    CC: Skin Check (FBS: recheck left lateral lower leg. Check non-healing lesion on right buttock. No personal Hx of skin cancer. )    HPI:  Ms. Nithya Tolentino is a(n) 46 year old female who presents today as a return patient for a skin check.     Last seen on 2/19/21 for a skin check. At that time, a bx was performed on the left lower leg.     Today, patient presents for a recheck on the left lateral thigh. Notes a nonhealing lesion on the right buttock.     Patient is otherwise feeling well,  without additional skin concerns.    Labs Reviewed:  N/A    Physical Exam:  Vitals: LMP 03/15/2022 (Exact Date)   SKIN: Total skin excluding the gentital areas was performed. The exam included the head/face, neck, both arms, chest, back, abdomen, both legs, digits and/or nails. Declines genital exam. Yuliyanarciso Roach LPN prsent for buttocks exam  - Well healed scar at site of prior DF  - Follicularly based papule on the buttocks less than 10 lesions  - No other lesions of concern on areas examined.     Medications:  Current Outpatient Medications   Medication     acetaminophen (TYLENOL) 500 MG tablet     AUROVELA FE 1/20 1-20 MG-MCG tablet     azelastine (ASTELIN) 0.1 % nasal spray     fish oil-omega-3 fatty acids 1000 MG capsule     fluticasone (FLONASE) 50 MCG/ACT nasal spray     mometasone (NASONEX) 50 MCG/ACT nasal spray     ranitidine (ZANTAC) 150 MG capsule     RESTASIS 0.05 % ophthalmic emulsion     No current facility-administered medications for this visit.      Past Medical History:   Patient Active Problem List   Diagnosis     Seasonal allergic rhinitis     Dysmenorrhea     CARDIOVASCULAR SCREENING; LDL GOAL LESS THAN 160     Restless legs syndrome (RLS)     Family history of supraventricular tachycardia     Other chronic sinusitis     Rhinosinusitis     Premenopausal menorrhagia     Acquired hallux limitus of left foot     Past Medical History:   Diagnosis Date     Dysmenorrhea      Environmental allergies         CC No referring provider defined for this encounter. on close of this encounter.      Again, thank you for allowing me to participate in the care of your patient.        Sincerely,        Lexie Hernandez MD

## 2022-04-26 NOTE — NURSING NOTE
Nithya Tolentino's chief complaint for this visit includes:  Chief Complaint   Patient presents with     Skin Check     FBS: recheck left lateral lower leg. Check non-healing lesion on right buttock. No personal Hx of skin cancer.      PCP: Dede Marcelo    Referring Provider:  No referring provider defined for this encounter.    LMP 03/15/2022 (Exact Date)   No Pain (1)        Allergies   Allergen Reactions     Augmentin Nausea and Vomiting         Do you need any medication refills at today's visit? No    Yuliya Roach CMA

## 2022-04-26 NOTE — PATIENT INSTRUCTIONS
Start over-the-counter benzoyl peroxide 10% wash on the buttocks.  If 10% is too irritating you can use the 5%. ([employer] makes this product. It is available here at the pharmacy or at [employer]). This medication can bleach your towels and clothing.     It is found in a purple tube in the acne aisle.            Can bleach clothing and towels. Please, hold this if irritated.

## 2022-05-10 ENCOUNTER — PRE VISIT (OUTPATIENT)
Dept: INFECTIOUS DISEASES | Facility: CLINIC | Age: 47
End: 2022-05-10
Payer: COMMERCIAL

## 2022-05-10 ENCOUNTER — VIRTUAL VISIT (OUTPATIENT)
Dept: INFECTIOUS DISEASES | Facility: CLINIC | Age: 47
End: 2022-05-10
Attending: NURSE PRACTITIONER
Payer: COMMERCIAL

## 2022-05-10 DIAGNOSIS — B27.90 EBV INFECTION: ICD-10-CM

## 2022-05-10 PROCEDURE — 99205 OFFICE O/P NEW HI 60 MIN: CPT | Mod: GT | Performed by: INTERNAL MEDICINE

## 2022-05-10 NOTE — LETTER
5/10/2022       RE: Nithya Tolentino  [address] [facility] [address]     Dear Colleague,    Thank you for referring your patient, Nithya Tolentino, to the [facility] [place] [facility]. Please see a copy of my visit note below.    .    [facility]  New Patient Visit  5/10/2022     Chief Complaint:  EBV    Visit started at 1:30 pm  Ended at 2:14 pm.  Visit done on [facility]well    HPI:  Nithya Tolentino is a 46 year old female with a hx of fatigue and found to have EBV.    September she had diffuse pain in body. She has low back pain chronically. However this did not help. The pain spread.     In November daughter and  got covid. However she repeatedly tested negative.     In December her adult daughter got strep and mono at the same time. She lives with her  but not her daughter.     In mid march she had pain and then it stopped. However that evening she had hands itching. No rash but severe itching. Ice pack helped her sleep. The itching went whole body. Very severe. She had excoriations. At the ER they found she had elevated LFTs. T bili was 1.9. Hydroxyzine and ranitidine helped.     She had swollen glands on R in mid March. US showed reactive glands. They checked EBV which was positive. She did get a sore throat afterwards. She also got sore glands. Her itching went away with normalization of her LFTs. She says she has not felt well for an extended time. However just in the last few days she had felt slightly improved.     Her aunt had malignant myeloma that became amyloidosis. The patient's mother also had early onset alzheimer's.     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 She is down to 4-5 clients a day.    She has brain fog. She says she had this before but it worsened after march. She has also had irritability.     She has had chills. She did have night sweats which started in early  march.     ROS: Complete 12-point ROS is negative except as noted above.    Past Medical History:  Past Medical History:   Diagnosis Date     Dysmenorrhea      Environmental allergies        Past Surgical History:  Past Surgical History:   Procedure Laterality Date     NO HISTORY OF SURGERY         Social History:  Social History     Socioeconomic History     Marital status:      Spouse name: Not on file     Number of children: Not on file     Years of education: Not on file     Highest education level: Not on file   Occupational History     Not on file   Tobacco Use     Smoking status: Never Smoker     Smokeless tobacco: Never Used   Vaping Use     Vaping Use: Never used   Substance and Sexual Activity     Alcohol use: Yes     Comment: very rarely 1-2 times per year     Drug use: No     Sexual activity: Yes     Partners: Male     Birth control/protection: Pill, Male Surgical     Comment: Testicular cancer twice   Other Topics Concern     Parent/sibling w/ CABG, MI or angioplasty before 65F 55M? No   Social History Narrative    Dairy/d 1 servings/d.     Caffeine 1 servings/d    Exercise 1-2 x week    Sunscreen used - Yes    Seatbelts used - Yes    Working smoke/CO detectors in the home - No    Guns stored in the home - Yes    Self Breast Exams - Sometimes    Self Testicular Exam - NOT APPLICABLE    Eye Exam up to date - No    Dental Exam up to date - Yes    Pap Smear up to date - Yes    Mammogram up to date - NOT APPLICABLE    PSA up to date - NOT APPLICABLE    Dexa Scan up to date - NOT APPLICABLE    Flex Sig / Colonoscopy up to date - NOT APPLICABLE    Immunizations up to date - Yes    Abuse: Current or Past(Physical, Sexual or Emotional)- No    Do you feel safe in your environment - Yes     Social Determinants of Health     Financial Resource Strain: Not on file   Food Insecurity: Not on file   Transportation Needs: Not on file   Physical Activity: Not on file   Stress: Not on file   Social Connections:  Not on file   Intimate Partner Violence: Not on file   Housing Stability: Not on file       Family Medical History:  Family History   Problem Relation Age of Onset     Thyroid Disease Mother      Dementia Mother      Hypertension Mother      Anesthesia Reaction Father         difficulty coming out     Arthritis Maternal Grandmother         osteoarthritis      Cancer Maternal Grandfather         bone     Cerebrovascular Disease Maternal Grandfather         Passed in 1997     Other Cancer Maternal Grandfather         bone cancer     Neurologic Disorder Paternal Grandmother         brain tumor     Hypertension Paternal Grandmother         Passed in 2007 after brain tumor surgery     Gastrointestinal Disease Other         cousin on dads side has celiac     Thyroid Disease Maternal Aunt      Other Cancer Cousin         Melanoma     Other Cancer Other         Amyloid Cancer       Allergies:     Allergies   Allergen Reactions     Augmentin Nausea and Vomiting       Medications:  Current Outpatient Medications   Medication Sig Dispense Refill     AUROVELA FE 1/20 1-20 MG-MCG tablet TAKE 1 TABLET BY MOUTH DAILY 84 tablet 1     azelastine (ASTELIN) 0.1 % nasal spray USE 2 SPRAYS IN EACH NOSTRIL TWICE DAILY 30 mL 1     clindamycin (CLEOCIN T) 1 % external lotion Apply topically 2 times daily For 3 weeks 60 mL 0     fish oil-omega-3 fatty acids 1000 MG capsule Take 2 g by mouth daily        mometasone (NASONEX) 50 MCG/ACT nasal spray Spray 2 sprays into both nostrils daily 17 g 11     naproxen (NAPROSYN) 250 MG tablet Take 250 mg by mouth 2 times daily (with meals)       ranitidine (ZANTAC) 150 MG capsule Take 1 capsule (150 mg) by mouth 2 times daily 90 capsule 0     RESTASIS 0.05 % ophthalmic emulsion INSTILL 1 DROP IN BOTH EYES TWICE DAILY       acetaminophen (TYLENOL) 500 MG tablet Take 1-2 tablets by mouth every 6 hours as needed. 2 tablets before bedtime (Patient not taking: No sig reported)          Immunizations:  Immunization History   Administered Date(s) Administered     COVID-19,PF,Pfizer (12+ Yrs) 09/17/2021, 10/08/2021     Influenza (intradermal) 01/11/2013     Influenza Vaccine IM > 6 months Valent IIV4 (Alfuria,Fluzone) 10/03/2019     TDAP Vaccine (Adacel) 08/24/2012       Exam:  B/P: Data Unavailable, T: Data Unavailable, P: Data Unavailable, R: Data Unavailable, Weight: 0 lbs 0 oz  Gen: Alert and in no distress.   Psych: Normal affect. Alert and oriented.   HEENT: PERRL. No icterus. Oropharynx pink and moist without lesions.   Neck: No lymphadenopathy.   CV: Regular rate and rhythm without m/r/g.   Chest: Clear to auscultation bilaterally without wheezes or crackles.   Abdomen: Soft, non-distended. Non-tender. Normal bowel sounds.   Extremities: Warm and well perfused.   Skin: No rashes or lesions noted.     Labs:  WBC   Date Value Ref Range Status   10/03/2019 6.1 4.0 - 11.0 10e9/L Final     WBC Count   Date Value Ref Range Status   04/25/2022 6.9 4.0 - 11.0 10e3/uL Final       CRP Inflammation   Date Value Ref Range Status   03/20/2022 8.4 (H) 0.0 - 8.0 mg/L Final   01/21/2022 2.9 0.0 - 8.0 mg/L Final       Creatinine   Date Value Ref Range Status   04/25/2022 0.87 0.52 - 1.04 mg/dL Final   03/28/2022 0.94 0.52 - 1.04 mg/dL Final   03/23/2022 0.85 0.52 - 1.04 mg/dL Final   11/29/2016 0.82 0.52 - 1.04 mg/dL Final   02/01/2016 0.79 0.52 - 1.04 mg/dL Final   06/24/2015 0.80 0.52 - 1.04 mg/dL Final       Assessment and Plan:  Nithya Tolentino is a 46 year old female with EBV who presents for evaluation.     Recent EBV. Per her labs it appears she had acute EBV in March. This can be quite severe in adults and last for weeks to months. She is having some improvement which is promising. The treatment is supportive care. There are no helpful antivirals or therapies. I encouraged her that she is getting support she needs.     She was wondering if she could have had Covid last November when her   had it. This is certainly possible. It would be hard to discern by labs and I do not know how it would . Again the treatment would be supportive care.     Overall I think she will have an extended course but will recover with time and rest.     Return to clinic as needed.     Total visit including reviewing of records on day of visit was 60 minutes.     Megan York MD

## 2022-05-10 NOTE — PROGRESS NOTES
[facility]  New Patient Visit  5/10/2022     Chief Complaint:  EBV    Visit started at 1:30 pm  Ended at 2:14 pm.  Visit done on [facility]    HPI:  Nithya Tolentino is a 46 year old female with a hx of fatigue and found to have EBV.    September she had diffuse pain in body. She has low back pain chronically. However this did not help. The pain spread.     In November daughter and  got covid. However she repeatedly tested negative.     In December her adult daughter got strep and mono at the same time. She lives with her  but not her daughter.     In mid march she had pain and then it stopped. However that evening she had hands itching. No rash but severe itching. Ice pack helped her sleep. The itching went whole body. Very severe. She had excoriations. At the ER they found she had elevated LFTs. T bili was 1.9. Hydroxyzine and ranitidine helped.     She had swollen glands on R in mid March. US showed reactive glands. They checked EBV which was positive. She did get a sore throat afterwards. She also got sore glands. Her itching went away with normalization of her LFTs. She says she has not felt well for an extended time. However just in the last few days she had felt slightly improved.     Her aunt had malignant myeloma that became amyloidosis. The patient's mother also had early onset alzheimer's.     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 She is down to 4-5 clients a day.    She has brain fog. She says she had this before but it worsened after march. She has also had irritability.     She has had chills. She did have night sweats which started in early march.     ROS: Complete 12-point ROS is negative except as noted above.    Past Medical History:  Past Medical History:   Diagnosis Date     Dysmenorrhea      Environmental allergies        Past Surgical History:  Past Surgical History:   Procedure Laterality Date     NO HISTORY OF SURGERY         Social History:  Social  History     Socioeconomic History     Marital status:      Spouse name: Not on file     Number of children: Not on file     Years of education: Not on file     Highest education level: Not on file   Occupational History     Not on file   Tobacco Use     Smoking status: Never Smoker     Smokeless tobacco: Never Used   Vaping Use     Vaping Use: Never used   Substance and Sexual Activity     Alcohol use: Yes     Comment: very rarely 1-2 times per year     Drug use: No     Sexual activity: Yes     Partners: Male     Birth control/protection: Pill, Male Surgical     Comment: Testicular cancer twice   Other Topics Concern     Parent/sibling w/ CABG, MI or angioplasty before 65F 55M? No   Social History Narrative    Dairy/d 1 servings/d.     Caffeine 1 servings/d    Exercise 1-2 x week    Sunscreen used - Yes    Seatbelts used - Yes    Working smoke/CO detectors in the home - No    Guns stored in the home - Yes    Self Breast Exams - Sometimes    Self Testicular Exam - NOT APPLICABLE    Eye Exam up to date - No    Dental Exam up to date - Yes    Pap Smear up to date - Yes    Mammogram up to date - NOT APPLICABLE    PSA up to date - NOT APPLICABLE    Dexa Scan up to date - NOT APPLICABLE    Flex Sig / Colonoscopy up to date - NOT APPLICABLE    Immunizations up to date - Yes    Abuse: Current or Past(Physical, Sexual or Emotional)- No    Do you feel safe in your environment - Yes     Social Determinants of Health     Financial Resource Strain: Not on file   Food Insecurity: Not on file   Transportation Needs: Not on file   Physical Activity: Not on file   Stress: Not on file   Social Connections: Not on file   Intimate Partner Violence: Not on file   Housing Stability: Not on file       Family Medical History:  Family History   Problem Relation Age of Onset     Thyroid Disease Mother      Dementia Mother      Hypertension Mother      Anesthesia Reaction Father         difficulty coming out     Arthritis Maternal  Grandmother         osteoarthritis      Cancer Maternal Grandfather         bone     Cerebrovascular Disease Maternal Grandfather         Passed in 1997     Other Cancer Maternal Grandfather         bone cancer     Neurologic Disorder Paternal Grandmother         brain tumor     Hypertension Paternal Grandmother         Passed in 2007 after brain tumor surgery     Gastrointestinal Disease Other         cousin on dads side has celiac     Thyroid Disease Maternal Aunt      Other Cancer Cousin         Melanoma     Other Cancer Other         Amyloid Cancer       Allergies:     Allergies   Allergen Reactions     Augmentin Nausea and Vomiting       Medications:  Current Outpatient Medications   Medication Sig Dispense Refill     AUROVELA FE 1/20 1-20 MG-MCG tablet TAKE 1 TABLET BY MOUTH DAILY 84 tablet 1     azelastine (ASTELIN) 0.1 % nasal spray USE 2 SPRAYS IN EACH NOSTRIL TWICE DAILY 30 mL 1     clindamycin (CLEOCIN T) 1 % external lotion Apply topically 2 times daily For 3 weeks 60 mL 0     fish oil-omega-3 fatty acids 1000 MG capsule Take 2 g by mouth daily        mometasone (NASONEX) 50 MCG/ACT nasal spray Spray 2 sprays into both nostrils daily 17 g 11     naproxen (NAPROSYN) 250 MG tablet Take 250 mg by mouth 2 times daily (with meals)       ranitidine (ZANTAC) 150 MG capsule Take 1 capsule (150 mg) by mouth 2 times daily 90 capsule 0     RESTASIS 0.05 % ophthalmic emulsion INSTILL 1 DROP IN BOTH EYES TWICE DAILY       acetaminophen (TYLENOL) 500 MG tablet Take 1-2 tablets by mouth every 6 hours as needed. 2 tablets before bedtime (Patient not taking: No sig reported)         Immunizations:  Immunization History   Administered Date(s) Administered     COVID-19,,Pfizer (12+ Yrs) 09/17/2021, 10/08/2021     Influenza (intradermal) 01/11/2013     Influenza Vaccine IM > 6 months Valent IIV4 (Alfuria,Fluzone) 10/03/2019     TDAP Vaccine (Adacel) 08/24/2012       Exam:  B/P: Data Unavailable, T: Data Unavailable, P:  Data Unavailable, R: Data Unavailable, Weight: 0 lbs 0 oz  Gen: Alert and in no distress.   Psych: Normal affect. Alert and oriented.   HEENT: PERRL. No icterus. Oropharynx pink and moist without lesions.   Neck: No lymphadenopathy.   CV: Regular rate and rhythm without m/r/g.   Chest: Clear to auscultation bilaterally without wheezes or crackles.   Abdomen: Soft, non-distended. Non-tender. Normal bowel sounds.   Extremities: Warm and well perfused.   Skin: No rashes or lesions noted.     Labs:  WBC   Date Value Ref Range Status   10/03/2019 6.1 4.0 - 11.0 10e9/L Final     WBC Count   Date Value Ref Range Status   04/25/2022 6.9 4.0 - 11.0 10e3/uL Final       CRP Inflammation   Date Value Ref Range Status   03/20/2022 8.4 (H) 0.0 - 8.0 mg/L Final   01/21/2022 2.9 0.0 - 8.0 mg/L Final       Creatinine   Date Value Ref Range Status   04/25/2022 0.87 0.52 - 1.04 mg/dL Final   03/28/2022 0.94 0.52 - 1.04 mg/dL Final   03/23/2022 0.85 0.52 - 1.04 mg/dL Final   11/29/2016 0.82 0.52 - 1.04 mg/dL Final   02/01/2016 0.79 0.52 - 1.04 mg/dL Final   06/24/2015 0.80 0.52 - 1.04 mg/dL Final       Assessment and Plan:  Nithya Tolentino is a 46 year old female with EBV who presents for evaluation.     Recent EBV. Per her labs it appears she had acute EBV in March. This can be quite severe in adults and last for weeks to months. She is having some improvement which is promising. The treatment is supportive care. There are no helpful antivirals or therapies. I encouraged her that she is getting support she needs.     She was wondering if she could have had Covid last November when her  had it. This is certainly possible. It would be hard to discern by labs and I do not know how it would . Again the treatment would be supportive care.     Overall I think she will have an extended course but will recover with time and rest.     Return to clinic as needed.     Total visit including reviewing of records on day of  visit was 60 minutes.     Megan York MD

## 2022-05-14 ENCOUNTER — HEALTH MAINTENANCE LETTER (OUTPATIENT)
Age: 47
End: 2022-05-14

## 2022-05-23 ENCOUNTER — MYC MEDICAL ADVICE (OUTPATIENT)
Dept: FAMILY MEDICINE | Facility: OTHER | Age: 47
End: 2022-05-23
Payer: COMMERCIAL

## 2022-05-23 DIAGNOSIS — J30.89 SEASONAL ALLERGIC RHINITIS DUE TO OTHER ALLERGIC TRIGGER: ICD-10-CM

## 2022-05-24 NOTE — TELEPHONE ENCOUNTER
I am out the rest of this week. I could try and do this via doximetry. Could she also send me a message with what she has tried and failed and I will get this completed.       WARNER Pro CNP  Questions or concerns please feel free to send me a [employer] message or call me  Phone : [phone]

## 2022-05-24 NOTE — TELEPHONE ENCOUNTER
Chart review:   Patient has tried Nasonex and flonase.   Patient was prescribed Astelin nasal spray on 3/20/21

## 2022-05-24 NOTE — TELEPHONE ENCOUNTER
Patient dropped off form at .  Placed in Team C form bin [place].  Patient would like this faxed to number on form and then would like to .  Thank you.

## 2022-05-31 NOTE — TELEPHONE ENCOUNTER
Patient calling in regards to this form, she is wondering if it has been taken care of ?  Please respond to patient via My Chart.    Patient is almost out of medication and would like this take care of as soon as possible.    Sophia Cali XRO/

## 2022-06-01 RX ORDER — AZELASTINE 1 MG/ML
SPRAY, METERED NASAL
Qty: 30 ML | Refills: 1 | Status: SHIPPED | OUTPATIENT
Start: 2022-06-01 | End: 2022-08-08

## 2022-06-01 NOTE — TELEPHONE ENCOUNTER
Please send this to me via [employer]WARNER Alex CNP  Questions or concerns please feel free to send me a [employer] message or call me  Phone : [phone]

## 2022-06-01 NOTE — TELEPHONE ENCOUNTER
Printed document provided by patient. Patient wants it fill out. In ma bin in our pod.      Siria Oh MA

## 2022-06-01 NOTE — TELEPHONE ENCOUNTER
Please check for this form, I did not see it yesterday.       WARNER Pro CNP  Questions or concerns please feel free to send me a [employer] message or call me  Phone : [phone]

## 2022-06-01 NOTE — TELEPHONE ENCOUNTER
Form completed and faxed to [employer] [phone]. Form placed at front for patient to  per patient request. Copy sent to scanning.    Karrie Roblero

## 2022-06-01 NOTE — TELEPHONE ENCOUNTER
Form faxed, patient needs to fill out top section. Please also put in our fax number in provider section.   I sent to [phone] Fax number.       WARNER Pro CNP  Questions or concerns please feel free to send me a [employer] message or call me  Phone : [phone]

## 2022-06-07 ENCOUNTER — PRE VISIT (OUTPATIENT)
Dept: ONCOLOGY | Facility: CLINIC | Age: 47
End: 2022-06-07

## 2022-06-07 ENCOUNTER — VIRTUAL VISIT (OUTPATIENT)
Dept: ONCOLOGY | Facility: CLINIC | Age: 47
End: 2022-06-07
Attending: NURSE PRACTITIONER
Payer: COMMERCIAL

## 2022-06-07 DIAGNOSIS — R74.8 ELEVATED LIVER ENZYMES: ICD-10-CM

## 2022-06-07 DIAGNOSIS — R59.0 CERVICAL LYMPHADENOPATHY: ICD-10-CM

## 2022-06-07 DIAGNOSIS — Z80.7 FAMILY HISTORY OF MULTIPLE MYELOMA: ICD-10-CM

## 2022-06-07 PROCEDURE — 99204 OFFICE O/P NEW MOD 45 MIN: CPT | Mod: GT | Performed by: INTERNAL MEDICINE

## 2022-06-07 NOTE — PROGRESS NOTES
[facility]  Section of General Neurology  New Patient Visit      Nithya Tolentino MRN# [id number]   Age: 46 year old YOB: 1975     Requesting physician: Dede Marin     Reason for Consultation: body aches, difficulty concentration/memory changes.        History of Presenting Symptoms:   Nithya Tolentino is a 46 year old female who presents today for evaluation of difficulty concentrating.  Last year (March 2021)she noticed some clumsniess, brain fog, trouble findings words.   Has a h/o low back pain, diffuse pain all over body worsened previously too. Will shut door on head e.g., very uncoordinated.  March 2022 she had an episode of pain, fatigue, later profound itching.  Still has no energy.    This morning she triple booked, e.g. showcasing her brain fog.  Has required less clients and a long nap previously.    She feels weak at a cellular level  She has a h/o RLS-magnesium help  Elevated LFTs/bilifubin caused the itching most likely .     Has seen endocrinology, ID, oncology recently as well.      EBV was found at this time.    Still has pain, fatigue, bran fog.      --Sleep:  Needs 7 hours of sleep usually.  Does OK overall.  --Mood: Notes extreme stress, 2 kids that are adopted with FASD, 17 and 18.  They have issues with law, etc.    Had therapy for anxiety in 2020 . Brain spotting therapy.    --Drinking/drug use: rare etoh  --Family history: Mom has dementia, dx in early 60s.      She lives in [place]  She is a therapist, [employer] specialist.        Past Medical History:     Patient Active Problem List   Diagnosis     Seasonal allergic rhinitis     Dysmenorrhea     CARDIOVASCULAR SCREENING; LDL GOAL LESS THAN 160     Restless legs syndrome (RLS)     Family history of supraventricular tachycardia     Other chronic sinusitis     Rhinosinusitis     Premenopausal menorrhagia     Acquired hallux limitus of left foot     Benitez-Barr virus infection      Ulnar neuropathy of both upper extremities     Past Medical History:   Diagnosis Date     Dysmenorrhea      Environmental allergies         Past Surgical History:     Past Surgical History:   Procedure Laterality Date     NO HISTORY OF SURGERY          Social History:     Social History     Tobacco Use     Smoking status: Never Smoker     Smokeless tobacco: Never Used   Vaping Use     Vaping Use: Never used   Substance Use Topics     Alcohol use: Yes     Comment: very rarely 1-2 times per year     Drug use: No        Family History:     Family History   Problem Relation Age of Onset     Thyroid Disease Mother      Dementia Mother      Hypertension Mother      Anesthesia Reaction Father         difficulty coming out     Arthritis Maternal Grandmother         osteoarthritis      Cancer Maternal Grandfather         bone     Cerebrovascular Disease Maternal Grandfather         Passed in 1997     Other Cancer Maternal Grandfather         bone cancer     Neurologic Disorder Paternal Grandmother         brain tumor     Hypertension Paternal Grandmother         Passed in 2007 after brain tumor surgery     Gastrointestinal Disease Other         cousin on dads side has celiac     Thyroid Disease Maternal Aunt      Other Cancer Cousin         Melanoma     Other Cancer Other         Amyloid Cancer        Medications:     Current Outpatient Medications   Medication Sig     acetaminophen (TYLENOL) 500 MG tablet Take 1-2 tablets by mouth every 6 hours as needed. 2 tablets before bedtime (Patient not taking: No sig reported)     AUROVELA FE 1/20 1-20 MG-MCG tablet TAKE 1 TABLET BY MOUTH DAILY     azelastine (ASTELIN) 0.1 % nasal spray USE 2 SPRAYS IN EACH NOSTRIL TWICE DAILY     clindamycin (CLEOCIN T) 1 % external lotion Apply topically 2 times daily For 3 weeks     fish oil-omega-3 fatty acids 1000 MG capsule Take 2 g by mouth daily      mometasone (NASONEX) 50 MCG/ACT nasal spray Spray 2 sprays into both nostrils daily      "naproxen (NAPROSYN) 250 MG tablet Take 250 mg by mouth 2 times daily (with meals)     ranitidine (ZANTAC) 150 MG capsule Take 1 capsule (150 mg) by mouth 2 times daily     RESTASIS 0.05 % ophthalmic emulsion INSTILL 1 DROP IN BOTH EYES TWICE DAILY     No current facility-administered medications for this visit.        Allergies:     Allergies   Allergen Reactions     Augmentin Nausea and Vomiting        Review of Systems:   As noted above     Physical Exam:   Vitals: /71 (BP Location: Right arm, Patient Position: Sitting, Cuff Size: Adult Regular)   Ht 1.702 m (5' 7\")   Wt 82.6 kg (182 lb)   BMI 28.51 kg/m         Neuro:   General Appearance: No apparent distress, well-nourished, well-groomed, pleasant     Mental Status: Alert and oriented  Speech fluent and comprehension intact. No dysarthria. Thought process linear.        Cranial Nerves:   II: Visual fields: normal  III: Pupils: 3 mm, equal, round, reactive to light   III,IV,VI: Extraocular Movements: intact   V: Facial sensation: intact to light touch  VII: Facial strength: intact without asymmetry    Motor Exam:   5/5 Diffusely    Sensory: intact to light touch, vibration    Coordination: no dysmetria with finger-to-nose bilaterally    Reflexes: biceps, triceps, brachioradialis, patellar, and ankle jerks 2+ and symmetric.           Data: Pertinent prior to visit       Laboratory:    B12>1000  TSH   Date Value Ref Range Status   03/28/2022 0.54 0.40 - 4.00 mU/L Final   12/05/2019 0.65 0.40 - 4.00 mU/L Final          Assessment and Plan:   Assessment:  Divya Tolentino is a pleasant 46 year old female who presents to discuss memory changes, fatigue/malaise and muscle aches.  Her worsening was most abrupt after what appears be an EBV infection which led to transaminitis/other manifestations of liver dysfunction in fact including itching/hyperbilirubinemia.  This has set her back a fair amount in my estimation.  I think this does correlate with what she is " experiencing.  She does seem to have recovered to a degree, albeit slowly.   Discussed that optimizing sleep and mood can be important for recovery in this regard.  Gradual return to previous normalcy including exercise is recommended as well but this can be quite tiring initially.   Discussed that I think she will continue to slowly improve but will see what else we can optimize as below     Plan:  --MRI brain w/w/o  --CK level, other lab work reviewed as above, non contributory  --Neuropsychological testing regarding cognitive complaints to further parse out her current cognitive strengths/weaknessess.  She notes a family history of dementia.  I do not suspect this testing will reveal any concerns with regards to dementia.    --Follow up in 4-5 months to review testing and see how she is doing.                Moses Manzanares MD   of Neurology   [facility]      The total time of this encounter today amounted to 60 minutes. This time included time spent with the patient, prep work, ordering tests, and performing post visit documentation.

## 2022-06-07 NOTE — LETTER
2022         RE: Nithya Tolentino   [address] [facility] [address]        Dear Colleague,    Thank you for referring your patient, Nithya Tolentino, to the [facility]. Please see a copy of my visit note below.    [facility] Hematology / Oncology  Initial Visit / Consultation Note 2022  Name: Nithya Tolentino  :  1975  MRN:  [id number]    --------------------    Assessment / Plan:  Over the course of the whole visit, Divya and I reviewed the usual for her adenopathy and symptoms.  At the end of the day, it feels like there is very much 2 processes going on.  1 was the Benitez-Barr virus that was likely contributing to her now resolved transaminitis and cervical adenopathy seen on imaging.  The other is a more meandering body pain of unclear etiology.  To this end, with her family history of myeloma, we will plan to screen for occult malignancy and grab a CT scan of the chest abdomen pelvis.    Thank you kindly for this consultation.  Kev Urban MD    --------------------    Interval History:  Nithya present for evaluation of adenopathy.  Divya is quite clear about over a year ago involving lots of body pains.  Pain feels deep inside.  She does have a family history of multiple myeloma.  No unexplained fevers, chills or sweats although is prone to feeling warm at times always but is stabilized.  Appetite is otherwise been okay.  Oddly enough, she did develop Benitez-Barr virus in the winter and immediately her bone pains went away and resolved.  This is associated with adenopathy and transaminitis.  Fortunately she did seem to clear the infection.  Again oddly enough, as the Benitez-Barr virus seems to dissipate, her bony pain seems to return.    --------------------    Review of Systems:  10 point ROS negative except for that above.    Past Medical / Surgical History:  Past Medical History:   Diagnosis Date     Dysmenorrhea       Environmental allergies      Past Surgical History:   Procedure Laterality Date     NO HISTORY OF SURGERY         Family History:  Family History   Problem Relation Age of Onset     Thyroid Disease Mother      Dementia Mother      Hypertension Mother      Anesthesia Reaction Father         difficulty coming out     Arthritis Maternal Grandmother         osteoarthritis      Cancer Maternal Grandfather         bone     Cerebrovascular Disease Maternal Grandfather         Passed in 1997     Other Cancer Maternal Grandfather         bone cancer     Neurologic Disorder Paternal Grandmother         brain tumor     Hypertension Paternal Grandmother         Passed in 2007 after brain tumor surgery     Gastrointestinal Disease Other         cousin on dads side has celiac     Thyroid Disease Maternal Aunt      Other Cancer Cousin         Melanoma     Other Cancer Other         Amyloid Cancer       Social History:  Social History     Socioeconomic History     Marital status:      Spouse name: None     Number of children: None     Years of education: None     Highest education level: None   Tobacco Use     Smoking status: Never Smoker     Smokeless tobacco: Never Used   Vaping Use     Vaping Use: Never used   Substance and Sexual Activity     Alcohol use: Yes     Comment: very rarely 1-2 times per year     Drug use: No     Sexual activity: Yes     Partners: Male     Birth control/protection: Pill, Male Surgical     Comment: Testicular cancer twice   Other Topics Concern     Parent/sibling w/ CABG, MI or angioplasty before 65F 55M? No   Social History Narrative    Dairy/d 1 servings/d.     Caffeine 1 servings/d    Exercise 1-2 x week    Sunscreen used - Yes    Seatbelts used - Yes    Working smoke/CO detectors in the home - No    Guns stored in the home - Yes    Self Breast Exams - Sometimes    Self Testicular Exam - NOT APPLICABLE    Eye Exam up to date - No    Dental Exam up to date - Yes    Pap Smear up to date - Yes     Mammogram up to date - NOT APPLICABLE    PSA up to date - NOT APPLICABLE    Dexa Scan up to date - NOT APPLICABLE    Flex Sig / Colonoscopy up to date - NOT APPLICABLE    Immunizations up to date - Yes    Abuse: Current or Past(Physical, Sexual or Emotional)- No    Do you feel safe in your environment - Yes       Medications / Allergies:  Reviewed in EMR.    --------------------    Physical Exam:  Video visit.    Labs / Imaging / Path:  We reviewed CBC, CMP, TSH.     Video Visit:  Nithya is a 46 year old female who is being evaluated via a billable video visit.  }    Video start time: 2:02 PM  Video end time: 2:32 PM    Provider location: Off-site  Patient location: Home    Mode of transmission: [employer] / [employer].        Again, thank you for allowing me to participate in the care of your patient.        Sincerely,        Kev Urban MD

## 2022-06-07 NOTE — PROGRESS NOTES
[facility] Hematology / Oncology  Initial Visit / Consultation Note 2022  Name: Nithya Tolentino  :  1975  MRN:  [id number]    --------------------    Assessment / Plan:  Over the course of the whole visit, Divya and I reviewed the usual for her adenopathy and symptoms.  At the end of the day, it feels like there is very much 2 processes going on.  1 was the Benitez-Barr virus that was likely contributing to her now resolved transaminitis and cervical adenopathy seen on imaging.  The other is a more meandering body pain of unclear etiology.  To this end, with her family history of myeloma, we will plan to screen for occult malignancy and grab a CT scan of the chest abdomen pelvis.    Thank you kindly for this consultation.  Kev Urban MD    --------------------    Interval History:  Nithya present for evaluation of adenopathy.  Divya is quite clear about over a year ago involving lots of body pains.  Pain feels deep inside.  She does have a family history of multiple myeloma.  No unexplained fevers, chills or sweats although is prone to feeling warm at times always but is stabilized.  Appetite is otherwise been okay.  Oddly enough, she did develop Benitez-Barr virus in the winter and immediately her bone pains went away and resolved.  This is associated with adenopathy and transaminitis.  Fortunately she did seem to clear the infection.  Again oddly enough, as the Benitez-Barr virus seems to dissipate, her bony pain seems to return.    --------------------    Review of Systems:  10 point ROS negative except for that above.    Past Medical / Surgical History:  Past Medical History:   Diagnosis Date     Dysmenorrhea      Environmental allergies      Past Surgical History:   Procedure Laterality Date     NO HISTORY OF SURGERY         Family History:  Family History   Problem Relation Age of Onset     Thyroid Disease Mother      Dementia Mother      Hypertension Mother      Anesthesia  Reaction Father         difficulty coming out     Arthritis Maternal Grandmother         osteoarthritis      Cancer Maternal Grandfather         bone     Cerebrovascular Disease Maternal Grandfather         Passed in 1997     Other Cancer Maternal Grandfather         bone cancer     Neurologic Disorder Paternal Grandmother         brain tumor     Hypertension Paternal Grandmother         Passed in 2007 after brain tumor surgery     Gastrointestinal Disease Other         cousin on dads side has celiac     Thyroid Disease Maternal Aunt      Other Cancer Cousin         Melanoma     Other Cancer Other         Amyloid Cancer       Social History:  Social History     Socioeconomic History     Marital status:      Spouse name: None     Number of children: None     Years of education: None     Highest education level: None   Tobacco Use     Smoking status: Never Smoker     Smokeless tobacco: Never Used   Vaping Use     Vaping Use: Never used   Substance and Sexual Activity     Alcohol use: Yes     Comment: very rarely 1-2 times per year     Drug use: No     Sexual activity: Yes     Partners: Male     Birth control/protection: Pill, Male Surgical     Comment: Testicular cancer twice   Other Topics Concern     Parent/sibling w/ CABG, MI or angioplasty before 65F 55M? No   Social History Narrative    Dairy/d 1 servings/d.     Caffeine 1 servings/d    Exercise 1-2 x week    Sunscreen used - Yes    Seatbelts used - Yes    Working smoke/CO detectors in the home - No    Guns stored in the home - Yes    Self Breast Exams - Sometimes    Self Testicular Exam - NOT APPLICABLE    Eye Exam up to date - No    Dental Exam up to date - Yes    Pap Smear up to date - Yes    Mammogram up to date - NOT APPLICABLE    PSA up to date - NOT APPLICABLE    Dexa Scan up to date - NOT APPLICABLE    Flex Sig / Colonoscopy up to date - NOT APPLICABLE    Immunizations up to date - Yes    Abuse: Current or Past(Physical, Sexual or Emotional)- No     Do you feel safe in your environment - Yes       Medications / Allergies:  Reviewed in EMR.    --------------------    Physical Exam:  Video visit.    Labs / Imaging / Path:  We reviewed CBC, CMP, TSH.     Video Visit:  Nithya is a 46 year old female who is being evaluated via a billable video visit.  }    Video start time: 2:02 PM  Video end time: 2:32 PM    Provider location: Off-site  Patient location: Home    Mode of transmission: Quickcue / SYLOB.

## 2022-06-08 ENCOUNTER — OFFICE VISIT (OUTPATIENT)
Dept: NEUROLOGY | Facility: CLINIC | Age: 47
End: 2022-06-08
Attending: NURSE PRACTITIONER
Payer: COMMERCIAL

## 2022-06-08 ENCOUNTER — PRE VISIT (OUTPATIENT)
Dept: NEUROLOGY | Facility: CLINIC | Age: 47
End: 2022-06-08

## 2022-06-08 VITALS
SYSTOLIC BLOOD PRESSURE: 115 MMHG | HEIGHT: 67 IN | DIASTOLIC BLOOD PRESSURE: 71 MMHG | WEIGHT: 182 LBS | BODY MASS INDEX: 28.56 KG/M2

## 2022-06-08 DIAGNOSIS — R41.840 CONCENTRATION DEFICIT: ICD-10-CM

## 2022-06-08 DIAGNOSIS — Z81.8 FAMILY HISTORY OF DEMENTIA: ICD-10-CM

## 2022-06-08 DIAGNOSIS — R53.81 MALAISE: Primary | ICD-10-CM

## 2022-06-08 DIAGNOSIS — R41.3 MEMORY DEFICIT: ICD-10-CM

## 2022-06-08 DIAGNOSIS — R52 GENERALIZED BODY ACHES: ICD-10-CM

## 2022-06-08 PROCEDURE — 99215 OFFICE O/P EST HI 40 MIN: CPT | Performed by: STUDENT IN AN ORGANIZED HEALTH CARE EDUCATION/TRAINING PROGRAM

## 2022-06-08 RX ORDER — ZINC GLUCONATE 50 MG
50 TABLET ORAL DAILY
COMMUNITY
End: 2022-10-03

## 2022-06-08 ASSESSMENT — PAIN SCALES - GENERAL: PAINLEVEL: MILD PAIN (2)

## 2022-06-08 NOTE — LETTER
6/8/2022         RE: Nithya Tolentino  [address]  [facility] [address]        Dear Colleague,    Thank you for referring your patient, Nithya Tolentino, to the [facility]UR[facility] [place]. Please see a copy of my visit note below.    [facility]  Section of General Neurology  New Patient Visit      iNthya Tolentino MRN# [id number]   Age: 46 year old YOB: 1975     Requesting physician: Dede Marin     Reason for Consultation: body aches, difficulty concentration/memory changes.        History of Presenting Symptoms:   Nithya Tolentino is a 46 year old female who presents today for evaluation of difficulty concentrating.  Last year (March 2021)she noticed some clumsniess, brain fog, trouble findings words.   Has a h/o low back pain, diffuse pain all over body worsened previously too. Will shut door on head e.g., very uncoordinated.  March 2022 she had an episode of pain, fatigue, later profound itching.  Still has no energy.    This morning she triple booked, e.g. showcasing her brain fog.  Has required less clients and a long nap previously.    She feels weak at a cellular level  She has a h/o RLS-magnesium help  Elevated LFTs/bilifubin caused the itching most likely .     Has seen endocrinology, ID, oncology recently as well.      EBV was found at this time.    Still has pain, fatigue, bran fog.      --Sleep:  Needs 7 hours of sleep usually.  Does OK overall.  --Mood: Notes extreme stress, 2 kids that are adopted with FASD, 17 and 18.  They have issues with law, etc.    Had therapy for anxiety in 2020 . Brain spotting therapy.    --Drinking/drug use: rare etoh  --Family history: Mom has dementia, dx in early 60s.      She lives in [place]  She is a therapist, [employer] specialist.        Past Medical History:     Patient Active Problem List   Diagnosis     Seasonal allergic rhinitis     Dysmenorrhea     CARDIOVASCULAR SCREENING;  LDL GOAL LESS THAN 160     Restless legs syndrome (RLS)     Family history of supraventricular tachycardia     Other chronic sinusitis     Rhinosinusitis     Premenopausal menorrhagia     Acquired hallux limitus of left foot     Benitez-Barr virus infection     Ulnar neuropathy of both upper extremities     Past Medical History:   Diagnosis Date     Dysmenorrhea      Environmental allergies         Past Surgical History:     Past Surgical History:   Procedure Laterality Date     NO HISTORY OF SURGERY          Social History:     Social History     Tobacco Use     Smoking status: Never Smoker     Smokeless tobacco: Never Used   Vaping Use     Vaping Use: Never used   Substance Use Topics     Alcohol use: Yes     Comment: very rarely 1-2 times per year     Drug use: No        Family History:     Family History   Problem Relation Age of Onset     Thyroid Disease Mother      Dementia Mother      Hypertension Mother      Anesthesia Reaction Father         difficulty coming out     Arthritis Maternal Grandmother         osteoarthritis      Cancer Maternal Grandfather         bone     Cerebrovascular Disease Maternal Grandfather         Passed in 1997     Other Cancer Maternal Grandfather         bone cancer     Neurologic Disorder Paternal Grandmother         brain tumor     Hypertension Paternal Grandmother         Passed in 2007 after brain tumor surgery     Gastrointestinal Disease Other         cousin on dads side has celiac     Thyroid Disease Maternal Aunt      Other Cancer Cousin         Melanoma     Other Cancer Other         Amyloid Cancer        Medications:     Current Outpatient Medications   Medication Sig     acetaminophen (TYLENOL) 500 MG tablet Take 1-2 tablets by mouth every 6 hours as needed. 2 tablets before bedtime (Patient not taking: No sig reported)     AUROVELA FE 1/20 1-20 MG-MCG tablet TAKE 1 TABLET BY MOUTH DAILY     azelastine (ASTELIN) 0.1 % nasal spray USE 2 SPRAYS IN EACH NOSTRIL TWICE DAILY  "    clindamycin (CLEOCIN T) 1 % external lotion Apply topically 2 times daily For 3 weeks     fish oil-omega-3 fatty acids 1000 MG capsule Take 2 g by mouth daily      mometasone (NASONEX) 50 MCG/ACT nasal spray Spray 2 sprays into both nostrils daily     naproxen (NAPROSYN) 250 MG tablet Take 250 mg by mouth 2 times daily (with meals)     ranitidine (ZANTAC) 150 MG capsule Take 1 capsule (150 mg) by mouth 2 times daily     RESTASIS 0.05 % ophthalmic emulsion INSTILL 1 DROP IN BOTH EYES TWICE DAILY     No current facility-administered medications for this visit.        Allergies:     Allergies   Allergen Reactions     Augmentin Nausea and Vomiting        Review of Systems:   As noted above     Physical Exam:   Vitals: /71 (BP Location: Right arm, Patient Position: Sitting, Cuff Size: Adult Regular)   Ht 1.702 m (5' 7\")   Wt 82.6 kg (182 lb)   BMI 28.51 kg/m         Neuro:   General Appearance: No apparent distress, well-nourished, well-groomed, pleasant     Mental Status: Alert and oriented  Speech fluent and comprehension intact. No dysarthria. Thought process linear.        Cranial Nerves:   II: Visual fields: normal  III: Pupils: 3 mm, equal, round, reactive to light   III,IV,VI: Extraocular Movements: intact   V: Facial sensation: intact to light touch  VII: Facial strength: intact without asymmetry    Motor Exam:   5/5 Diffusely    Sensory: intact to light touch, vibration    Coordination: no dysmetria with finger-to-nose bilaterally    Reflexes: biceps, triceps, brachioradialis, patellar, and ankle jerks 2+ and symmetric.           Data: Pertinent prior to visit       Laboratory:    B12>1000  TSH   Date Value Ref Range Status   03/28/2022 0.54 0.40 - 4.00 mU/L Final   12/05/2019 0.65 0.40 - 4.00 mU/L Final          Assessment and Plan:   Assessment:  Divya Tolentino is a pleasant 46 year old female who presents to discuss memory changes, fatigue/malaise and muscle aches.  Her worsening was most abrupt " after what appears be an EBV infection which led to transaminitis/other manifestations of liver dysfunction in fact including itching/hyperbilirubinemia.  This has set her back a fair amount in my estimation.  I think this does correlate with what she is experiencing.  She does seem to have recovered to a degree, albeit slowly.   Discussed that optimizing sleep and mood can be important for recovery in this regard.  Gradual return to previous normalcy including exercise is recommended as well but this can be quite tiring initially.   Discussed that I think she will continue to slowly improve but will see what else we can optimize as below     Plan:  --MRI brain w/w/o  --CK level, other lab work reviewed as above, non contributory  --Neuropsychological testing regarding cognitive complaints to further parse out her current cognitive strengths/weaknessess.  She notes a family history of dementia.  I do not suspect this testing will reveal any concerns with regards to dementia.    --Follow up in 4-5 months to review testing and see how she is doing.                Moses Manzanares MD   of Neurology   [facility]      The total time of this encounter today amounted to 60 minutes. This time included time spent with the patient, prep work, ordering tests, and performing post visit documentation.      Nithya Tolentino's goals for this visit include:   Chief Complaint   Patient presents with     New Patient     Generalized body aches, Concentration deficit, Family history of dementia   Memory deficit Ref Dede Marcelo APRN CNP Scheduled by patient, records   in [facility]       She requests these members of her care team be copied on today's visit information: yes    PCP: Dede Marcelo    Referring Provider:  WARNER Farmer CNP  [address]  [place] [address]    /71 (BP Location: Right arm, Patient Position: Sitting, Cuff  "Size: Adult Regular)   Ht 1.702 m (5' 7\")   Wt 82.6 kg (182 lb)   BMI 28.51 kg/m      Do you need any medication refills at today's visit? No  DEENA Barrow, [facility] ([facility])          Again, thank you for allowing me to participate in the care of your patient.        Sincerely,        Osmel Manzanares MD    "

## 2022-06-08 NOTE — PATIENT INSTRUCTIONS
Add one blood test for me (CK, muscles)  MRI brain  Neuropsych testing.    Know OT is an option to help with clumsiness/cognitive recovery.    Lets check in in the fall to review everything formally.

## 2022-06-08 NOTE — PROGRESS NOTES
"Nithya Tolentino's goals for this visit include:   Chief Complaint   Patient presents with     New Patient     Generalized body aches, Concentration deficit, Family history of dementia   Memory deficit Ref Dede Marcelo APRN CNP Scheduled by patient, records   in [facility]       She requests these members of her care team be copied on today's visit information: yes    PCP: Dede Marcelo    Referring Provider:  WARNER Farmer CNP  [address] [address]    /71 (BP Location: Right arm, Patient Position: Sitting, Cuff Size: Adult Regular)   Ht 1.702 m (5' 7\")   Wt 82.6 kg (182 lb)   BMI 28.51 kg/m      Do you need any medication refills at today's visit? No  IRINA Barrow., CMA ([facility])      "

## 2022-06-09 ENCOUNTER — LAB (OUTPATIENT)
Dept: LAB | Facility: OTHER | Age: 47
End: 2022-06-09
Payer: COMMERCIAL

## 2022-06-09 DIAGNOSIS — R52 GENERALIZED BODY ACHES: ICD-10-CM

## 2022-06-09 DIAGNOSIS — R79.89 LOW TSH LEVEL: ICD-10-CM

## 2022-06-09 LAB — CK SERPL-CCNC: 56 U/L (ref 30–225)

## 2022-06-09 PROCEDURE — 99000 SPECIMEN HANDLING OFFICE-LAB: CPT

## 2022-06-09 PROCEDURE — 84445 ASSAY OF TSI GLOBULIN: CPT | Mod: 90

## 2022-06-09 PROCEDURE — 36415 COLL VENOUS BLD VENIPUNCTURE: CPT

## 2022-06-09 PROCEDURE — 82550 ASSAY OF CK (CPK): CPT

## 2022-06-13 NOTE — PATIENT INSTRUCTIONS
1) Labs and CT CAP per Divya's schedule.  2) BJT 2-3 weeks after completing above to review.    Kev Urban MD.

## 2022-06-14 ENCOUNTER — HOSPITAL ENCOUNTER (OUTPATIENT)
Dept: MRI IMAGING | Facility: CLINIC | Age: 47
Discharge: HOME | End: 2022-06-14
Attending: STUDENT IN AN ORGANIZED HEALTH CARE EDUCATION/TRAINING PROGRAM | Admitting: STUDENT IN AN ORGANIZED HEALTH CARE EDUCATION/TRAINING PROGRAM
Payer: COMMERCIAL

## 2022-06-14 DIAGNOSIS — Z81.8 FAMILY HISTORY OF DEMENTIA: ICD-10-CM

## 2022-06-14 DIAGNOSIS — R41.3 MEMORY DEFICIT: ICD-10-CM

## 2022-06-14 LAB — TSI SER-ACNC: <1 TSI INDEX

## 2022-06-14 PROCEDURE — 255N000002 HC RX 255 OP 636: Performed by: STUDENT IN AN ORGANIZED HEALTH CARE EDUCATION/TRAINING PROGRAM

## 2022-06-14 PROCEDURE — A9585 GADOBUTROL INJECTION: HCPCS | Performed by: STUDENT IN AN ORGANIZED HEALTH CARE EDUCATION/TRAINING PROGRAM

## 2022-06-14 PROCEDURE — 70553 MRI BRAIN STEM W/O & W/DYE: CPT

## 2022-06-14 RX ORDER — GADOBUTROL 604.72 MG/ML
10 INJECTION INTRAVENOUS ONCE
Status: COMPLETED | OUTPATIENT
Start: 2022-06-14 | End: 2022-06-14

## 2022-06-14 RX ADMIN — GADOBUTROL 8.5 ML: 604.72 INJECTION INTRAVENOUS at 19:18

## 2022-06-16 ENCOUNTER — TELEPHONE (OUTPATIENT)
Dept: NEUROLOGY | Facility: CLINIC | Age: 47
End: 2022-06-16
Payer: COMMERCIAL

## 2022-06-16 NOTE — TELEPHONE ENCOUNTER
Called to inform of unrevealing MRI.  I agree with report to my review.  No change to the plan at this time.

## 2022-07-13 ENCOUNTER — E-VISIT (OUTPATIENT)
Dept: PEDIATRICS | Facility: CLINIC | Age: 47
End: 2022-07-13
Payer: COMMERCIAL

## 2022-07-13 DIAGNOSIS — J01.90 ACUTE SINUSITIS WITH SYMPTOMS > 10 DAYS: Primary | ICD-10-CM

## 2022-07-13 PROCEDURE — 99421 OL DIG E/M SVC 5-10 MIN: CPT | Performed by: PHYSICIAN ASSISTANT

## 2022-07-13 RX ORDER — DOXYCYCLINE HYCLATE 100 MG
100 TABLET ORAL 2 TIMES DAILY
Qty: 14 TABLET | Refills: 0 | Status: SHIPPED | OUTPATIENT
Start: 2022-07-13 | End: 2022-07-20

## 2022-07-13 NOTE — PATIENT INSTRUCTIONS
Dear Nithya Tolentino    After reviewing your responses, I've been able to diagnose you with?a sinus infection caused by bacteria.?     Based on your responses and diagnosis, I have prescribed doxycycline to treat your symptoms. I have sent this to your pharmacy.?     It is also important to stay well hydrated, get lots of rest and take over-the-counter decongestants,?tylenol?or ibuprofen if you?are able to?take those medications per your primary care provider to help relieve discomfort.?     It is important that you take?all of?your prescribed medication even if your symptoms are improving after a few doses.? Taking?all of?your medicine helps prevent the symptoms from returning.?     If your symptoms worsen, you develop severe headache, vomiting, high fever (>102), or are not improving in 7 days, please contact your primary care provider for an appointment or visit any of our convenient Walk-in Care or Urgent Care Centers to be seen which can be found on our website?here.?     Thanks again for choosing?us?as your health care partner,?   ?  Sydnee Hobson PA-C?

## 2022-07-14 ENCOUNTER — MYC MEDICAL ADVICE (OUTPATIENT)
Dept: ONCOLOGY | Facility: CLINIC | Age: 47
End: 2022-07-14

## 2022-07-14 NOTE — TELEPHONE ENCOUNTER
Patient called to clarify timing of CT scan.    Patient thought CT scan/labs should be in 6 months from Dr Urban office visit.    Explained to patient that notes say we will screen for occult malignancy with a CT scan.    Patient verbalized understanding.  Transferred to  to schedule CT scan, labs and follow up Dr Urban appointment.    Daniella Waterman RN on 7/14/2022 at 1:52 PM

## 2022-08-06 DIAGNOSIS — J30.89 SEASONAL ALLERGIC RHINITIS DUE TO OTHER ALLERGIC TRIGGER: ICD-10-CM

## 2022-08-08 RX ORDER — AZELASTINE 1 MG/ML
SPRAY, METERED NASAL
Qty: 30 ML | Refills: 1 | Status: SHIPPED | OUTPATIENT
Start: 2022-08-08 | End: 2022-10-03

## 2022-08-08 NOTE — TELEPHONE ENCOUNTER
Refill given.       WARNER Pro CNP  Questions or concerns please feel free to send me a [employer] message or call me  Phone : [phone]

## 2022-08-09 ENCOUNTER — ANCILLARY PROCEDURE (OUTPATIENT)
Dept: CT IMAGING | Facility: CLINIC | Age: 47
End: 2022-08-09
Attending: INTERNAL MEDICINE
Payer: MEDICAID

## 2022-08-09 ENCOUNTER — LAB (OUTPATIENT)
Dept: LAB | Facility: CLINIC | Age: 47
End: 2022-08-09
Payer: MEDICAID

## 2022-08-09 DIAGNOSIS — R74.8 ELEVATED LIVER ENZYMES: ICD-10-CM

## 2022-08-09 DIAGNOSIS — R59.0 CERVICAL LYMPHADENOPATHY: ICD-10-CM

## 2022-08-09 DIAGNOSIS — Z80.7 FAMILY HISTORY OF MULTIPLE MYELOMA: ICD-10-CM

## 2022-08-09 LAB
ERYTHROCYTE [SEDIMENTATION RATE] IN BLOOD BY WESTERGREN METHOD: 10 MM/HR (ref 0–20)
FERRITIN SERPL-MCNC: 107 NG/ML (ref 8–252)
INTERPRETATION: NORMAL
IRON SATN MFR SERPL: 36 % (ref 15–46)
IRON SERPL-MCNC: 122 UG/DL (ref 35–180)
LDH SERPL L TO P-CCNC: 141 U/L (ref 81–234)
SIGNIFICANT RESULTS: NORMAL
SPECIMEN DESCRIPTION: NORMAL
TEST DETAILS, MDL: NORMAL
TIBC SERPL-MCNC: 342 UG/DL (ref 240–430)
TOTAL PROTEIN SERUM FOR ELP: 7.4 G/DL (ref 6.4–8.3)

## 2022-08-09 PROCEDURE — 71260 CT THORAX DX C+: CPT | Mod: GC | Performed by: RADIOLOGY

## 2022-08-09 PROCEDURE — 81270 JAK2 GENE: CPT | Performed by: INTERNAL MEDICINE

## 2022-08-09 PROCEDURE — 99000 SPECIMEN HANDLING OFFICE-LAB: CPT | Performed by: INTERNAL MEDICINE

## 2022-08-09 PROCEDURE — 86334 IMMUNOFIX E-PHORESIS SERUM: CPT

## 2022-08-09 PROCEDURE — 83615 LACTATE (LD) (LDH) ENZYME: CPT | Performed by: INTERNAL MEDICINE

## 2022-08-09 PROCEDURE — 36415 COLL VENOUS BLD VENIPUNCTURE: CPT | Performed by: INTERNAL MEDICINE

## 2022-08-09 PROCEDURE — 82784 ASSAY IGA/IGD/IGG/IGM EACH: CPT | Performed by: INTERNAL MEDICINE

## 2022-08-09 PROCEDURE — 74177 CT ABD & PELVIS W/CONTRAST: CPT | Mod: GC | Performed by: RADIOLOGY

## 2022-08-09 PROCEDURE — G0452 MOLECULAR PATHOLOGY INTERPR: HCPCS | Performed by: PATHOLOGY

## 2022-08-09 PROCEDURE — 82728 ASSAY OF FERRITIN: CPT | Performed by: INTERNAL MEDICINE

## 2022-08-09 PROCEDURE — 85652 RBC SED RATE AUTOMATED: CPT | Performed by: INTERNAL MEDICINE

## 2022-08-09 PROCEDURE — 83521 IG LIGHT CHAINS FREE EACH: CPT | Performed by: INTERNAL MEDICINE

## 2022-08-09 PROCEDURE — 84238 ASSAY NONENDOCRINE RECEPTOR: CPT | Performed by: INTERNAL MEDICINE

## 2022-08-09 PROCEDURE — 83550 IRON BINDING TEST: CPT | Performed by: INTERNAL MEDICINE

## 2022-08-09 PROCEDURE — 84165 PROTEIN E-PHORESIS SERUM: CPT | Performed by: STUDENT IN AN ORGANIZED HEALTH CARE EDUCATION/TRAINING PROGRAM

## 2022-08-09 PROCEDURE — 84155 ASSAY OF PROTEIN SERUM: CPT | Performed by: INTERNAL MEDICINE

## 2022-08-09 PROCEDURE — 81219 CALR GENE COM VARIANTS: CPT

## 2022-08-09 PROCEDURE — 81403 MOPATH PROCEDURE LEVEL 4: CPT | Performed by: INTERNAL MEDICINE

## 2022-08-09 RX ORDER — IOPAMIDOL 755 MG/ML
105 INJECTION, SOLUTION INTRAVASCULAR ONCE
Status: COMPLETED | OUTPATIENT
Start: 2022-08-09 | End: 2022-08-09

## 2022-08-09 RX ADMIN — IOPAMIDOL 105 ML: 755 INJECTION, SOLUTION INTRAVASCULAR at 10:05

## 2022-08-10 LAB
ALBUMIN SERPL ELPH-MCNC: 4.4 G/DL (ref 3.7–5.1)
ALPHA1 GLOB SERPL ELPH-MCNC: 0.3 G/DL (ref 0.2–0.4)
ALPHA2 GLOB SERPL ELPH-MCNC: 0.6 G/DL (ref 0.5–0.9)
B-GLOBULIN SERPL ELPH-MCNC: 0.8 G/DL (ref 0.6–1)
GAMMA GLOB SERPL ELPH-MCNC: 1.3 G/DL (ref 0.7–1.6)
IGG SERPL-MCNC: 1386 MG/DL (ref 610–1616)
KAPPA LC FREE SER-MCNC: 1.54 MG/DL (ref 0.33–1.94)
KAPPA LC FREE/LAMBDA FREE SER NEPH: 0.98 {RATIO} (ref 0.26–1.65)
LAMBDA LC FREE SERPL-MCNC: 1.57 MG/DL (ref 0.57–2.63)
M PROTEIN SERPL ELPH-MCNC: 0 G/DL
PROT PATTERN SERPL ELPH-IMP: NORMAL
PROT PATTERN SERPL IFE-IMP: NORMAL
STFR SERPL-MCNC: 2.4 MG/L

## 2022-08-15 LAB
INTERPRETATION: NORMAL
SIGNIFICANT RESULTS: NORMAL
SPECIMEN DESCRIPTION: NORMAL
TEST DETAILS, MDL: NORMAL

## 2022-08-22 DIAGNOSIS — N94.6 DYSMENORRHEA: ICD-10-CM

## 2022-08-22 RX ORDER — NORETHINDRONE ACETATE AND ETHINYL ESTRADIOL 1MG-20(21)
1 KIT ORAL DAILY
Qty: 84 TABLET | Refills: 1 | OUTPATIENT
Start: 2022-08-22

## 2022-08-22 NOTE — TELEPHONE ENCOUNTER
Patient requesting birth control refill, please find out if she does want this filled. If so we should have a conversation given her age and estrogen.       WARNER Pro CNP  Questions or concerns please feel free to send me a [employer] message or call me  Phone : [phone]

## 2022-08-23 RX ORDER — NORETHINDRONE ACETATE AND ETHINYL ESTRADIOL 1MG-20(21)
1 KIT ORAL DAILY
Qty: 84 TABLET | Refills: 1 | Status: SHIPPED | OUTPATIENT
Start: 2022-08-23 | End: 2022-10-03

## 2022-08-23 NOTE — TELEPHONE ENCOUNTER
Patient called back and stated she saw claudia few months ago and the Dr told her to remain on the bcp so she would like to.  Do you need to see her?

## 2022-08-23 NOTE — TELEPHONE ENCOUNTER
I will send a refill as this can help with the painful periods.I do not need to see her.   However, she is due for an annual so should schedule this as well when able.     WARNER Pro CNP  Questions or concerns please feel free to send me a [employer] message or call me  Phone : [phone]

## 2022-09-03 ENCOUNTER — HEALTH MAINTENANCE LETTER (OUTPATIENT)
Age: 47
End: 2022-09-03

## 2022-09-07 ENCOUNTER — MYC MEDICAL ADVICE (OUTPATIENT)
Dept: FAMILY MEDICINE | Facility: OTHER | Age: 47
End: 2022-09-07

## 2022-09-07 DIAGNOSIS — E78.5 HYPERLIPIDEMIA LDL GOAL <130: ICD-10-CM

## 2022-09-07 DIAGNOSIS — J30.89 SEASONAL ALLERGIC RHINITIS DUE TO OTHER ALLERGIC TRIGGER: ICD-10-CM

## 2022-09-07 DIAGNOSIS — R74.8 ELEVATED LIVER ENZYMES: Primary | ICD-10-CM

## 2022-09-07 NOTE — TELEPHONE ENCOUNTER
RN is unable to refill requested medication since it has been over a year since it was last prescribed.     Jordi Glass RN

## 2022-09-08 RX ORDER — MOMETASONE FUROATE MONOHYDRATE 50 UG/1
2 SPRAY, METERED NASAL DAILY
Qty: 17 G | Refills: 11 | Status: SHIPPED | OUTPATIENT
Start: 2022-09-08

## 2022-09-08 NOTE — TELEPHONE ENCOUNTER
JuliusBizzingo responded to.       WARNER Pro CNP  Questions or concerns please feel free to send me a [email or web address] message or call me  Phone : [phone]

## 2022-10-02 ASSESSMENT — ENCOUNTER SYMPTOMS
FEVER: 0
FREQUENCY: 1
HEMATOCHEZIA: 0
SHORTNESS OF BREATH: 0
BREAST MASS: 0
NAUSEA: 0
CONSTIPATION: 0
CHILLS: 0
JOINT SWELLING: 0
MYALGIAS: 1
ARTHRALGIAS: 1
DIZZINESS: 1
COUGH: 0
DYSURIA: 0
HEMATURIA: 0
PALPITATIONS: 0
ABDOMINAL PAIN: 0
EYE PAIN: 0
WEAKNESS: 0
SORE THROAT: 0
NERVOUS/ANXIOUS: 0
PARESTHESIAS: 1
HEADACHES: 1
HEARTBURN: 1
DIARRHEA: 0

## 2022-10-03 ENCOUNTER — LAB (OUTPATIENT)
Dept: LAB | Facility: OTHER | Age: 47
End: 2022-10-03
Payer: COMMERCIAL

## 2022-10-03 ENCOUNTER — OFFICE VISIT (OUTPATIENT)
Dept: FAMILY MEDICINE | Facility: OTHER | Age: 47
End: 2022-10-03
Payer: COMMERCIAL

## 2022-10-03 VITALS
HEIGHT: 67 IN | BODY MASS INDEX: 28.33 KG/M2 | TEMPERATURE: 98.2 F | HEART RATE: 70 BPM | SYSTOLIC BLOOD PRESSURE: 122 MMHG | OXYGEN SATURATION: 96 % | WEIGHT: 180.5 LBS | RESPIRATION RATE: 20 BRPM | DIASTOLIC BLOOD PRESSURE: 80 MMHG

## 2022-10-03 DIAGNOSIS — R74.8 ELEVATED LIVER ENZYMES: ICD-10-CM

## 2022-10-03 DIAGNOSIS — M77.8 TENDONITIS OF FINGER: ICD-10-CM

## 2022-10-03 DIAGNOSIS — N94.6 DYSMENORRHEA: ICD-10-CM

## 2022-10-03 DIAGNOSIS — Z00.00 ROUTINE GENERAL MEDICAL EXAMINATION AT A HEALTH CARE FACILITY: Primary | ICD-10-CM

## 2022-10-03 DIAGNOSIS — R73.01 IMPAIRED FASTING GLUCOSE: ICD-10-CM

## 2022-10-03 DIAGNOSIS — J30.89 SEASONAL ALLERGIC RHINITIS DUE TO OTHER ALLERGIC TRIGGER: ICD-10-CM

## 2022-10-03 DIAGNOSIS — Z12.31 ENCOUNTER FOR SCREENING MAMMOGRAM FOR BREAST CANCER: ICD-10-CM

## 2022-10-03 DIAGNOSIS — R79.89 LOW TSH LEVEL: ICD-10-CM

## 2022-10-03 DIAGNOSIS — Z80.7 FAMILY HISTORY OF MULTIPLE MYELOMA: ICD-10-CM

## 2022-10-03 DIAGNOSIS — E78.5 HYPERLIPIDEMIA LDL GOAL <130: ICD-10-CM

## 2022-10-03 DIAGNOSIS — R59.0 CERVICAL LYMPHADENOPATHY: ICD-10-CM

## 2022-10-03 LAB
ALBUMIN SERPL-MCNC: 3.7 G/DL (ref 3.4–5)
ALP SERPL-CCNC: 53 U/L (ref 40–150)
ALT SERPL W P-5'-P-CCNC: 27 U/L (ref 0–50)
ANION GAP SERPL CALCULATED.3IONS-SCNC: 6 MMOL/L (ref 3–14)
AST SERPL W P-5'-P-CCNC: 26 U/L (ref 0–45)
BASOPHILS # BLD AUTO: 0 10E3/UL (ref 0–0.2)
BASOPHILS NFR BLD AUTO: 0 %
BILIRUB SERPL-MCNC: 0.9 MG/DL (ref 0.2–1.3)
BUN SERPL-MCNC: 13 MG/DL (ref 7–30)
CALCIUM SERPL-MCNC: 8.8 MG/DL (ref 8.5–10.1)
CHLORIDE BLD-SCNC: 109 MMOL/L (ref 94–109)
CHOLEST SERPL-MCNC: 216 MG/DL
CO2 SERPL-SCNC: 25 MMOL/L (ref 20–32)
CREAT SERPL-MCNC: 0.79 MG/DL (ref 0.52–1.04)
EOSINOPHIL # BLD AUTO: 0.3 10E3/UL (ref 0–0.7)
EOSINOPHIL NFR BLD AUTO: 4 %
ERYTHROCYTE [DISTWIDTH] IN BLOOD BY AUTOMATED COUNT: 13.5 % (ref 10–15)
FASTING STATUS PATIENT QL REPORTED: YES
GFR SERPL CREATININE-BSD FRML MDRD: >90 ML/MIN/1.73M2
GLUCOSE BLD-MCNC: 101 MG/DL (ref 70–99)
HBA1C MFR BLD: 5 % (ref 0–5.6)
HCT VFR BLD AUTO: 45.8 % (ref 35–47)
HDLC SERPL-MCNC: 74 MG/DL
HGB BLD-MCNC: 15.3 G/DL (ref 11.7–15.7)
LDLC SERPL CALC-MCNC: 122 MG/DL
LYMPHOCYTES # BLD AUTO: 2.5 10E3/UL (ref 0.8–5.3)
LYMPHOCYTES NFR BLD AUTO: 43 %
MCH RBC QN AUTO: 30.7 PG (ref 26.5–33)
MCHC RBC AUTO-ENTMCNC: 33.4 G/DL (ref 31.5–36.5)
MCV RBC AUTO: 92 FL (ref 78–100)
MONOCYTES # BLD AUTO: 0.6 10E3/UL (ref 0–1.3)
MONOCYTES NFR BLD AUTO: 11 %
NEUTROPHILS # BLD AUTO: 2.3 10E3/UL (ref 1.6–8.3)
NEUTROPHILS NFR BLD AUTO: 41 %
NONHDLC SERPL-MCNC: 142 MG/DL
PLATELET # BLD AUTO: 166 10E3/UL (ref 150–450)
POTASSIUM BLD-SCNC: 4.1 MMOL/L (ref 3.4–5.3)
PROT SERPL-MCNC: 7.3 G/DL (ref 6.8–8.8)
RBC # BLD AUTO: 4.98 10E6/UL (ref 3.8–5.2)
SODIUM SERPL-SCNC: 140 MMOL/L (ref 133–144)
T4 FREE SERPL-MCNC: 1.26 NG/DL (ref 0.76–1.46)
TRIGL SERPL-MCNC: 101 MG/DL
TSH SERPL DL<=0.005 MIU/L-ACNC: 0.43 MU/L (ref 0.4–4)
WBC # BLD AUTO: 5.7 10E3/UL (ref 4–11)

## 2022-10-03 PROCEDURE — 80061 LIPID PANEL: CPT

## 2022-10-03 PROCEDURE — 36415 COLL VENOUS BLD VENIPUNCTURE: CPT

## 2022-10-03 PROCEDURE — 84439 ASSAY OF FREE THYROXINE: CPT

## 2022-10-03 PROCEDURE — 99213 OFFICE O/P EST LOW 20 MIN: CPT | Mod: 25 | Performed by: NURSE PRACTITIONER

## 2022-10-03 PROCEDURE — 99396 PREV VISIT EST AGE 40-64: CPT | Performed by: NURSE PRACTITIONER

## 2022-10-03 PROCEDURE — 83036 HEMOGLOBIN GLYCOSYLATED A1C: CPT | Performed by: NURSE PRACTITIONER

## 2022-10-03 PROCEDURE — 80050 GENERAL HEALTH PANEL: CPT

## 2022-10-03 RX ORDER — AZELASTINE 1 MG/ML
2 SPRAY, METERED NASAL 2 TIMES DAILY
Qty: 30 ML | Refills: 5 | Status: SHIPPED | OUTPATIENT
Start: 2022-10-03 | End: 2023-03-30

## 2022-10-03 RX ORDER — NORETHINDRONE ACETATE AND ETHINYL ESTRADIOL 1MG-20(21)
1 KIT ORAL DAILY
Qty: 84 TABLET | Refills: 2 | Status: SHIPPED | OUTPATIENT
Start: 2022-10-03

## 2022-10-03 ASSESSMENT — ENCOUNTER SYMPTOMS
NERVOUS/ANXIOUS: 0
SHORTNESS OF BREATH: 0
ARTHRALGIAS: 1
PALPITATIONS: 0
PARESTHESIAS: 1
SORE THROAT: 0
DIZZINESS: 1
FREQUENCY: 1
HEMATOCHEZIA: 0
NAUSEA: 0
HEMATURIA: 0
HEARTBURN: 1
CONSTIPATION: 0
CHILLS: 0
DYSURIA: 0
DIARRHEA: 0
HEADACHES: 1
JOINT SWELLING: 0
FEVER: 0
EYE PAIN: 0
MYALGIAS: 1
ABDOMINAL PAIN: 0
COUGH: 0
BREAST MASS: 0
WEAKNESS: 0

## 2022-10-03 ASSESSMENT — PAIN SCALES - GENERAL: PAINLEVEL: MILD PAIN (2)

## 2022-10-03 NOTE — PROGRESS NOTES
SUBJECTIVE:   CC: Divya is an 46 year old who presents for preventive health visit.       Patient has been advised of split billing requirements and indicates understanding: Yes  Healthy Habits:     Getting at least 3 servings of Calcium per day:  NO    Bi-annual eye exam:  Yes    Dental care twice a year:  NO    Sleep apnea or symptoms of sleep apnea:  None    Diet:  Regular (no restrictions)    Frequency of exercise:  1 day/week    Duration of exercise:  15-30 minutes    Taking medications regularly:  Yes    Medication side effects:  Not applicable    PHQ-2 Total Score: 1    Additional concerns today:  Yes    Pain in joints of pointer finger    Started to get better then got covid in July  Set her back quite a bit  The past 3-4 weeks she started to have energy again.   She is mowing the lawn and working in the yard without being fatigued.   She has not felt well, sickness in her house making her tired. Yesterday she slept 9 hours and took a nap.   Has her energy back for the most part.   She feels that she is gaining weight since last year (5lbs)    Chronic back pain continues.   Worse because she was so sedentary the last year.   Trying to be more active again to help this    Pain in the pointer finger of her left hand.   Couple of months  No trauma   Cracks at times. Sore and stiff.   Not swollen      The 10-year ASCVD risk score (Colo DC Jr., et al., 2013) is: 0.6%    Values used to calculate the score:      Age: 46 years      Sex: Female      Is Non- : No      Diabetic: No      Tobacco smoker: No      Systolic Blood Pressure: 122 mmHg      Is BP treated: No      HDL Cholesterol: 74 mg/dL      Total Cholesterol: 216 mg/dL    Today's PHQ-2 Score:   PHQ-2 ( 1999 Pfizer) 10/2/2022   Q1: Little interest or pleasure in doing things 0   Q2: Feeling down, depressed or hopeless 1   PHQ-2 Score 1   PHQ-2 Total Score (12-17 Years)- Positive if 3 or more points; Administer PHQ-A if positive -    Q1: Little interest or pleasure in doing things Not at all   Q2: Feeling down, depressed or hopeless Several days   PHQ-2 Score 1       Abuse: Current or Past (Physical, Sexual or Emotional) - No  Do you feel safe in your environment? Yes        Social History     Tobacco Use     Smoking status: Never Smoker     Smokeless tobacco: Never Used   Substance Use Topics     Alcohol use: Yes     Comment: very rarely 1-2 times per year         Alcohol Use 10/2/2022   Prescreen: >3 drinks/day or >7 drinks/week? No   Prescreen: >3 drinks/day or >7 drinks/week? -       Reviewed orders with patient.  Reviewed health maintenance and updated orders accordingly - Yes  Lab work is in process    Breast Cancer Screening:    FHS-7:   Breast CA Risk Assessment (FHS-7) 3/30/2021 1/7/2022   Did any of your first-degree relatives have breast or ovarian cancer? No No   Did any of your relatives have bilateral breast cancer? No No   Did any man in your family have breast cancer? No No   Did any woman in your family have breast and ovarian cancer? Yes No   Did any woman in your family have breast cancer before age 50 y? No No   Do you have 2 or more relatives with breast and/or ovarian cancer? No No   Do you have 2 or more relatives with breast and/or bowel cancer? No No     click delete button to remove this line now  Mammogram Screening - Offered annual screening and updated Health Maintenance for mutual plan based on risk factor consideration    Pertinent mammograms are reviewed under the imaging tab.    History of abnormal Pap smear: NO - age 30-65 PAP every 5 years with negative HPV co-testing recommended  PAP / HPV Latest Ref Rng & Units 3/30/2021 11/29/2016 10/2/2013   PAP (Historical) - NIL NIL NIL   HPV16 NEG:Negative Negative Negative -   HPV18 NEG:Negative Negative Negative -   HRHPV NEG:Negative Negative Negative -     Reviewed and updated as needed this visit by clinical staff   Tobacco  Allergies  Meds   Med Hx  Surg Hx   "Fam Hx  Soc Hx          Reviewed and updated as needed this visit by Provider                   Past Medical History:   Diagnosis Date     Dysmenorrhea      Environmental allergies       Past Surgical History:   Procedure Laterality Date     NO HISTORY OF SURGERY         Review of Systems   Constitutional: Negative for chills and fever.   HENT: Positive for congestion. Negative for ear pain, hearing loss and sore throat.    Eyes: Negative for pain and visual disturbance.   Respiratory: Negative for cough and shortness of breath.    Cardiovascular: Negative for chest pain, palpitations and peripheral edema.   Gastrointestinal: Positive for heartburn. Negative for abdominal pain, constipation, diarrhea, hematochezia and nausea.   Breasts:  Negative for tenderness, breast mass and discharge.   Genitourinary: Positive for frequency. Negative for dysuria, genital sores, hematuria, pelvic pain, urgency, vaginal bleeding and vaginal discharge.   Musculoskeletal: Positive for arthralgias and myalgias. Negative for joint swelling.   Skin: Negative for rash.   Neurological: Positive for dizziness, headaches and paresthesias. Negative for weakness.   Psychiatric/Behavioral: Negative for mood changes. The patient is not nervous/anxious.        OBJECTIVE:   /80   Pulse 70   Temp 98.2  F (36.8  C) (Temporal)   Resp 20   Ht 1.702 m (5' 7\")   Wt 81.9 kg (180 lb 8 oz)   LMP 09/29/2022 (Exact Date)   SpO2 96%   BMI 28.27 kg/m    Physical Exam  Musculoskeletal:      Left hand: Tenderness (left index finger with tenderness at the distal and proxmal interphalangeal joints  ) and bony tenderness present. No swelling, deformity or lacerations. Normal range of motion. Normal strength. Normal sensation. There is no disruption of two-point discrimination. Normal capillary refill. Normal pulse.       GENERAL APPEARANCE: healthy, alert and no distress  EYES: Eyes grossly normal to inspection, PERRL and conjunctivae and sclerae " normal  HENT: ear canals and TM's normal, nose and mouth without ulcers or lesions, oropharynx clear and oral mucous membranes moist  NECK: no adenopathy, no asymmetry, masses, or scars and thyroid normal to palpation  RESP: lungs clear to auscultation - no rales, rhonchi or wheezes  BREAST: normal without masses, tenderness or nipple discharge and no palpable axillary masses or adenopathy  CV: regular rate and rhythm, normal S1 S2, no S3 or S4, no murmur, click or rub, no peripheral edema and peripheral pulses strong  ABDOMEN: soft, nontender, no hepatosplenomegaly, no masses and bowel sounds normal  MS: left index finger   SKIN: no suspicious lesions or rashes  NEURO: Normal strength and tone, sensory exam grossly normal, mentation intact and speech normal  PSYCH: mentation appears normal and affect normal/bright    Diagnostic Test Results:  Labs reviewed in Epic  Results for orders placed or performed in visit on 10/03/22   Hemoglobin A1c     Status: Normal   Result Value Ref Range    Hemoglobin A1C 5.0 0.0 - 5.6 %   Results for orders placed or performed in visit on 10/03/22   TSH     Status: Normal   Result Value Ref Range    TSH 0.43 0.40 - 4.00 mU/L   T4 free     Status: Normal   Result Value Ref Range    Free T4 1.26 0.76 - 1.46 ng/dL   Comprehensive metabolic panel (BMP + Alb, Alk Phos, ALT, AST, Total. Bili, TP)     Status: Abnormal   Result Value Ref Range    Sodium 140 133 - 144 mmol/L    Potassium 4.1 3.4 - 5.3 mmol/L    Chloride 109 94 - 109 mmol/L    Carbon Dioxide (CO2) 25 20 - 32 mmol/L    Anion Gap 6 3 - 14 mmol/L    Urea Nitrogen 13 7 - 30 mg/dL    Creatinine 0.79 0.52 - 1.04 mg/dL    Calcium 8.8 8.5 - 10.1 mg/dL    Glucose 101 (H) 70 - 99 mg/dL    Alkaline Phosphatase 53 40 - 150 U/L    AST 26 0 - 45 U/L    ALT 27 0 - 50 U/L    Protein Total 7.3 6.8 - 8.8 g/dL    Albumin 3.7 3.4 - 5.0 g/dL    Bilirubin Total 0.9 0.2 - 1.3 mg/dL    GFR Estimate >90 >60 mL/min/1.73m2   Lipid panel reflex to direct  LDL Fasting     Status: Abnormal   Result Value Ref Range    Cholesterol 216 (H) <200 mg/dL    Triglycerides 101 <150 mg/dL    Direct Measure HDL 74 >=50 mg/dL    LDL Cholesterol Calculated 122 (H) <=100 mg/dL    Non HDL Cholesterol 142 (H) <130 mg/dL    Patient Fasting > 8hrs? Yes     Narrative    Cholesterol  Desirable:  <200 mg/dL    Triglycerides  Normal:  Less than 150 mg/dL  Borderline High:  150-199 mg/dL  High:  200-499 mg/dL  Very High:  Greater than or equal to 500 mg/dL    Direct Measure HDL  Female:  Greater than or equal to 50 mg/dL   Male:  Greater than or equal to 40 mg/dL    LDL Cholesterol  Desirable:  <100mg/dL  Above Desirable:  100-129 mg/dL   Borderline High:  130-159 mg/dL   High:  160-189 mg/dL   Very High:  >= 190 mg/dL    Non HDL Cholesterol  Desirable:  130 mg/dL  Above Desirable:  130-159 mg/dL  Borderline High:  160-189 mg/dL  High:  190-219 mg/dL  Very High:  Greater than or equal to 220 mg/dL   CBC with platelets and differential     Status: None   Result Value Ref Range    WBC Count 5.7 4.0 - 11.0 10e3/uL    RBC Count 4.98 3.80 - 5.20 10e6/uL    Hemoglobin 15.3 11.7 - 15.7 g/dL    Hematocrit 45.8 35.0 - 47.0 %    MCV 92 78 - 100 fL    MCH 30.7 26.5 - 33.0 pg    MCHC 33.4 31.5 - 36.5 g/dL    RDW 13.5 10.0 - 15.0 %    Platelet Count 166 150 - 450 10e3/uL    % Neutrophils 41 %    % Lymphocytes 43 %    % Monocytes 11 %    % Eosinophils 4 %    % Basophils 0 %    Absolute Neutrophils 2.3 1.6 - 8.3 10e3/uL    Absolute Lymphocytes 2.5 0.8 - 5.3 10e3/uL    Absolute Monocytes 0.6 0.0 - 1.3 10e3/uL    Absolute Eosinophils 0.3 0.0 - 0.7 10e3/uL    Absolute Basophils 0.0 0.0 - 0.2 10e3/uL   CBC with Platelets & Differential     Status: None    Narrative    The following orders were created for panel order CBC with Platelets & Differential.  Procedure                               Abnormality         Status                     ---------                               -----------         ------         "             CBC with platelets and d...[[id number]]                      Final result                 Please view results for these tests on the individual orders.       ASSESSMENT/PLAN:   (Z00.00) Routine general medical examination at a health care facility  (primary encounter diagnosis)  Comment:   Plan: Updated HM  Declined vaccines today   Discussed labs today     (R73.01) Impaired fasting glucose  Comment:   Plan: Hemoglobin A1c        Would like A1C checked with her history of impaired glucose     (M77.8) Tendonitis of finger  Comment:   Plan: XR Finger Left G/E 2 Views, OFFICE         CONSULTATION,LEVEL IV        Noticed about a couple months ago. Discussed it could be some arthritis vs some tendonitis vs trigger finger (starrt of this). Recommend Xray to check arthritis, she will schedule this if no improvements. Recommend NSAIDs and  Follow up with ortho if no improvements.     (N94.6) Dysmenorrhea  Comment:   Plan: norethindrone-ethinyl estradiol (AUROVELA FE         1/20) 1-20 MG-MCG tablet            (J30.89) Seasonal allergic rhinitis due to other allergic trigger  Comment:   Plan: azelastine (ASTELIN) 0.1 % nasal spray, OFFICE         CONSULTATION,LEVEL IV        Continue medications.     (Z12.31) Encounter for screening mammogram for breast cancer  Comment:   Plan: MA Screening Digital Bilateral        Due in January       Patient has been advised of split billing requirements and indicates understanding: Yes    COUNSELING:  Reviewed preventive health counseling, as reflected in patient instructions    Estimated body mass index is 28.27 kg/m  as calculated from the following:    Height as of this encounter: 1.702 m (5' 7\").    Weight as of this encounter: 81.9 kg (180 lb 8 oz).    Weight management plan: Discussed healthy diet and exercise guidelines    She reports that she has never smoked. She has never used smokeless tobacco.      Counseling Resources:  ATP IV Guidelines  Pooled Cohorts Equation " Calculator  Breast Cancer Risk Calculator  BRCA-Related Cancer Risk Assessment: FHS-7 Tool  FRAX Risk Assessment  ICSI Preventive Guidelines  Dietary Guidelines for Americans, 2010  USDA's MyPlate  ASA Prophylaxis  Lung CA Screening    WARNER Pro CNP  [facility]

## 2022-10-04 ENCOUNTER — ANCILLARY PROCEDURE (OUTPATIENT)
Dept: ULTRASOUND IMAGING | Facility: OTHER | Age: 47
End: 2022-10-04
Attending: INTERNAL MEDICINE
Payer: COMMERCIAL

## 2022-10-04 DIAGNOSIS — E04.2 MULTIPLE THYROID NODULES: ICD-10-CM

## 2022-10-04 PROCEDURE — 76536 US EXAM OF HEAD AND NECK: CPT | Mod: TC | Performed by: RADIOLOGY

## 2022-10-07 NOTE — PATIENT INSTRUCTIONS
[facility]-Department of Endocrinology  Clinic Nurse: THELMA Marcus  CMA's: Artemio   Scheduling/Clinic phone number : [phone]   Clinic Fax: [phone]  On-Call Endocrine at [facility] (after hours/weekends): [phone] option [phone]    Please call the number below to schedule your labs.    [facility] [phone]   [facility] [phone]   [place] [phone]   [facility]  [phone]   [facility] [phone]   [place] [phone]   [facility]) [phone]   [facility] Only   [place] [phone]   [place] [phone]   [place] [phone]   [place] [phone]

## 2022-10-10 ASSESSMENT — ENCOUNTER SYMPTOMS
BLOATING: 1
NIGHT SWEATS: 0
ABDOMINAL PAIN: 0
FLANK PAIN: 0
PANIC: 0
LEG PAIN: 0
SEIZURES: 0
DECREASED APPETITE: 0
MUSCLE CRAMPS: 1
SORE THROAT: 0
WEIGHT LOSS: 0
SYNCOPE: 0
HYPERTENSION: 0
FEVER: 0
DOUBLE VISION: 0
BLOOD IN STOOL: 0
EYE WATERING: 1
BACK PAIN: 1
DECREASED CONCENTRATION: 1
DIARRHEA: 1
STIFFNESS: 1
CHILLS: 0
DECREASED LIBIDO: 0
DIFFICULTY URINATING: 1
MUSCLE WEAKNESS: 0
DYSURIA: 0
HALLUCINATIONS: 0
FATIGUE: 1
DEPRESSION: 0
POLYDIPSIA: 0
DISTURBANCES IN COORDINATION: 0
ORTHOPNEA: 0
HEARTBURN: 1
WEAKNESS: 0
EYE PAIN: 0
CONSTIPATION: 1
NECK MASS: 0
NAIL CHANGES: 0
SLEEP DISTURBANCES DUE TO BREATHING: 0
SINUS PAIN: 1
SPEECH CHANGE: 0
SINUS CONGESTION: 1
HEMATURIA: 0
INSOMNIA: 1
PALPITATIONS: 1
EYE IRRITATION: 0
ARTHRALGIAS: 1
TINGLING: 0
NUMBNESS: 0
HYPOTENSION: 0
DIZZINESS: 1
PARALYSIS: 0
LOSS OF CONSCIOUSNESS: 0
WEIGHT GAIN: 1
SMELL DISTURBANCE: 0
POOR WOUND HEALING: 0
EXERCISE INTOLERANCE: 0
JAUNDICE: 0
TROUBLE SWALLOWING: 0
NERVOUS/ANXIOUS: 1
HEADACHES: 1
NAUSEA: 0
LIGHT-HEADEDNESS: 0
HOARSE VOICE: 0
SKIN CHANGES: 0
BOWEL INCONTINENCE: 0
VOMITING: 0
HOT FLASHES: 0
RECTAL PAIN: 0
JOINT SWELLING: 0
POLYPHAGIA: 0
TASTE DISTURBANCE: 0
ALTERED TEMPERATURE REGULATION: 0
NECK PAIN: 1
MEMORY LOSS: 0
EYE REDNESS: 0
INCREASED ENERGY: 0
MYALGIAS: 1
TREMORS: 0

## 2022-10-11 ENCOUNTER — VIRTUAL VISIT (OUTPATIENT)
Dept: ENDOCRINOLOGY | Facility: CLINIC | Age: 47
End: 2022-10-11
Payer: COMMERCIAL

## 2022-10-11 ENCOUNTER — TELEPHONE (OUTPATIENT)
Dept: ENDOCRINOLOGY | Facility: CLINIC | Age: 47
End: 2022-10-11

## 2022-10-11 DIAGNOSIS — E04.2 MULTIPLE THYROID NODULES: ICD-10-CM

## 2022-10-11 DIAGNOSIS — R79.89 LOW TSH LEVEL: Primary | ICD-10-CM

## 2022-10-11 PROCEDURE — 99214 OFFICE O/P EST MOD 30 MIN: CPT | Performed by: INTERNAL MEDICINE

## 2022-10-11 NOTE — PROGRESS NOTES
Nithya Tolentino  is being evaluated via a billable video visit.      How would you like to obtain your AVS? [employer]  For the video visit, send the invitation by: Send to e-mail at: [email or web address]  Will anyone else be joining your video visit? Ema Richards CMA    Video-Visit Details    Type of service:  Video Visit    Video call duration:  Started: 9:11 am   Ended: 9:26 am     Originating Location (pt. Location): Home  Distant Location (provider location):  [facility]   Platform used for Video Visit: [employer]    The patient is seen in f/up.     Nithya Tolentino is a 46 year old female with a past medical history significant for restless leg syndrome, allergies, dysmenorrhea, who was initially evaluated in our clinic in April 2022.   The patient had the thyroid hormone levels periodically checked over the years. TSH has been either minimally suppressed or in the lower half of normal range since 2013. On 3/23/2022, when it was 0.33, associated with a normal free T4 of 1.39, in the context of a EBV viremia.  The TSH spontaneously normalized to 0.54, on 3/28/2022.  Prior free T4 levels have always been normal.  Thyroid peroxidase antibodies were detectable but below threshold at 35, on 1/25/2022.  Thyroid-stimulating immunoglobulins were undetectable on 6/9/2022.  On subsequent lab work from 10/3/2022, TSH was normal at 0.43, while free T4 was 1.26.    The neck ultrasound from 3/24/2022, done for evaluation of cervical lymphadenopathy, revealed the following findings: Right thyroid lobe measuring 5.1 x 1.8 x 2 cm, left thyroid lobe measuring 4.8 x 1.6 x 1.7 cm, with an isthmus of 3 mm.  The echotexture was fairly homogeneous.  2 thyroid nodules were described, one located in the right superior pole measuring 0.6 cm, hypoechoic and 1 located in the inferior right thyroid pole, measuring 1.1 cm, iso/hyperechoic. The vascularity of the gland was not increased.  The right inferior  thyroid nodule was hard to delineate.  There were no abnormal looking lymph nodes.  I reviewed the most recent thyroid ultrasound from 10/4/22.  The thyroid nodules have remained unchanged in size and ultrasound appearance.    Shortly after she recovered from EBV infection, she was diagnosed with COVID, in July 2022.  The patient has been seeing a kinesiologist and she was started on supplements which have helped with the energy level over the last 1 to 2 months.  She describes her energy as being back to her baseline level from 2 years ago.     She denies dysphagia, voice hoarseness.  She does endorse intermittent mild cough, related to allergies.  The eyes remain dry and they water frequently.  Restasis was recommended in the past and it did help but the patient was not able to afford the cost.  She has a follow-up appointment scheduled with ophthalmology in a couple of weeks.     Other pertinent labs:  3/20/22 CRP elevated at 8.4. it was normal in 1/2022 1/21/22 ferritin 103   Screening for RA negative 1/2022  Tissue transglutaminase antibodies negative in 2012, 2016 and 2022  Elevated B12 in January 2022  Normal vitamin D levels in 2013 in 2015  Elevated MCH on recent CBC from 3/23/2022    Past Medical History   Past Medical History:   Diagnosis Date     Dysmenorrhea      Environmental allergies    Sinusitis  Skin dermatofibroma  Ulnar tunnel syndrome   CTS   LBP - Degenerative changes of the lumbar spine  Restless leg syndrome     Past Surgical History   Past Surgical History:   Procedure Laterality Date     NO HISTORY OF SURGERY     [place] teeth surgery     Current Medications    Current Outpatient Medications:      acetaminophen (TYLENOL) 500 MG tablet, Take 1-2 tablets by mouth every 6 hours as needed 2 tablets before bedtime, Disp: , Rfl:      azelastine (ASTELIN) 0.1 % nasal spray, Spray 2 sprays into both nostrils 2 times daily, Disp: 30 mL, Rfl: 5     cholecalciferol (VITAMIN D3) 25 mcg (1000 units)  "capsule, Take 2 capsules by mouth daily, Disp: , Rfl:      fish oil-omega-3 fatty acids 1000 MG capsule, Take 2 g by mouth daily , Disp: , Rfl:      magnesium oxide 200 MG TABS, , Disp: , Rfl:      mometasone (NASONEX) 50 MCG/ACT nasal spray, Spray 2 sprays into both nostrils daily, Disp: 17 g, Rfl: 11     naproxen (NAPROSYN) 250 MG tablet, Take 250 mg by mouth 2 times daily (with meals), Disp: , Rfl:      norethindrone-ethinyl estradiol (AUROVELA FE 1/20) 1-20 MG-MCG tablet, Take 1 tablet by mouth daily, Disp: 84 tablet, Rfl: 2     selenium 50 MCG TABS tablet, Take 200 mcg by mouth daily, Disp: , Rfl:     Family History   Problem Relation Age of Onset     Thyroid Disease - hypothyroidism  Mother      Dementia Mother      Hypertension Mother      Anesthesia Reaction Father         difficulty coming out     Arthritis Maternal Grandmother         osteoarthritis      Cancer Maternal Grandfather         bone     Cerebrovascular Disease Maternal Grandfather         Passed in 1997     Neurologic Disorder Paternal Grandmother         brain tumor - benign      Hypertension Paternal Grandmother         Passed in 2007 after brain tumor surgery     Gastrointestinal Disease Other         cousin on dads side has celiac   Maternal aunt was diagnosed thyroid disease, multiple myeloma and amyloidosis. Another maternal aunt had part of the thyroid surgically removed.     Social History  .  She has 2 adopted children. Both have mental issues.  She denies smoking, drinking alcohol or using illicit drugs. Occupation: mental health therapist.     Review of Systems   Systemic:             Increased stress   Eye:                      Dry eyes for 5-6 years  Vishal-Laryngeal:     No dysphagia, some hoarseness since being dx with EB; no cough; \"globus pallidus\" present intermittently for the last month      Cardiovascular:    Palpitations present now once a week, seconds duration; no associated CP, SOB or dizziness; present mainly in " the evening when she goes to bed   Gastrointestinal:   Episodes of diarrhea in the last month   Endocrine:            No cold or heat intolerance; MP regular on BCP; have been fairly regular prior to staring the BCP  Neurological:        No tremor; numbness and tingling related to CTS  Skin:                    Chronic dry skin, no hair falling out; intermittent itchiness below the left armpit for the last 2 years    Psychological:     Anxiety - longstanding    Vital Signs     Previous Weights:    Wt Readings from Last 10 Encounters:   10/03/22 81.9 kg (180 lb 8 oz)   06/08/22 82.6 kg (182 lb)   04/25/22 82.8 kg (182 lb 9.6 oz)   03/29/22 81.5 kg (179 lb 12 oz)   03/28/22 81.2 kg (179 lb)   03/24/22 82.6 kg (182 lb)   03/23/22 82.9 kg (182 lb 12.8 oz)   01/21/22 84.4 kg (186 lb)   03/30/21 80.3 kg (177 lb)   11/03/20 80.5 kg (177 lb 8 oz)        LMP 09/29/2022 (Exact Date)     Physical Exam  General Appearance: alert, no distress noted   Eyes: grossly normal to inspection, conjunctivae and sclerae normal, no lid lag or stare   Respiratory: no audible wheeze, cough, or visible cyanosis.  No visible retractions or increased work of breathing.  Able to speak fully in complete sentences.  Neurological: Cranial nerves grossly intact, mentation intact and speech normal; no tremor of the hands   Skin: no lesions on exposed skin   Psychological: mentation appears normal, affect normal, judgement and insight intact, normal speech and appearance well-groomed    Lab Results  I reviewed prior lab results documented in Epic.  TSH   Date Value Ref Range Status   10/03/2022 0.43 0.40 - 4.00 mU/L Final   03/28/2022 0.54 0.40 - 4.00 mU/L Final   03/23/2022 0.33 (L) 0.40 - 4.00 mU/L Final   01/21/2022 0.43 0.40 - 4.00 mU/L Final   07/27/2021 0.71 0.40 - 4.00 mU/L Final   12/05/2019 0.65 0.40 - 4.00 mU/L Final   12/13/2018 0.66 0.40 - 4.00 mU/L Final   12/21/2017 0.40 0.40 - 4.00 mU/L Final   03/06/2017 0.31 (L) 0.40 - 4.00 mU/L Final    01/05/2017 0.37 (L) 0.40 - 4.00 mU/L Final       Assessment     Nithya Tolentino is a 46 year old female with a past medical history significant for restless leg syndrome, allergies, dysmenorrhea, referred for evaluation of abnormal TSH.     1. Low normal TSH   The patient has been having low normal or minimally suppressed TSH values since 2013.  Prior free T4 levels have been normal.  Most recent TSH was normal.  Thyroid peroxidase and thyroid-stimulating immunoglobulins have been negative.  The patient does have a history of dry and itchy eyes, but no proptosis.  There is a family history of hypothyroidism.  Differential diagnosis: Mild, clinically silent Graves' disease  No treatment necessary at this time.  Counseled the patient on signs and symptoms of hyperthyroidism and advised her to notify us if she notices them.  Otherwise, I recommended to have the thyroid hormone was checked on an annual basis.    2.  Thyroid nodules, with overall benign ultrasound features and stable follow-up ultrasound from 10/2022, when compared with prior March 2022.  I recommended a follow-up ultrasound in 1 year.  If stable, no further imaging tests are required.    Orders Placed This Encounter   Procedures     US Thyroid     TSH with free T4 reflex     Answers for HPI/ROS submitted by the patient on 10/10/2022  General Symptoms: Yes  Skin Symptoms: Yes  HENT Symptoms: Yes  EYE SYMPTOMS: Yes  HEART SYMPTOMS: Yes  LUNG SYMPTOMS: No  INTESTINAL SYMPTOMS: Yes  URINARY SYMPTOMS: Yes  GYNECOLOGIC SYMPTOMS: Yes  BREAST SYMPTOMS: No  SKELETAL SYMPTOMS: Yes  BLOOD SYMPTOMS: No  NERVOUS SYSTEM SYMPTOMS: Yes  MENTAL HEALTH SYMPTOMS: Yes  Ear pain: No  Ear discharge: No  Hearing loss: No  Tinnitus: Yes  Nosebleeds: No  Congestion: Yes  Sinus pain: Yes  Trouble swallowing: No   Voice hoarseness: No  Mouth sores: No  Sore throat: No  Tooth pain: No  Gum tenderness: No  Bleeding gums: No  Change in taste: No  Change in sense of smell: No  Dry  mouth: No  Hearing aid used: No  Neck lump: No  Fever: No  Loss of appetite: No  Weight loss: No  Weight gain: Yes  Fatigue: Yes  Night sweats: No  Chills: No  Increased stress: Yes  Excessive hunger: No  Excessive thirst: No  Feeling hot or cold when others believe the temperature is normal: No  Loss of height: No  Post-operative complications: No  Surgical site pain: No  Hallucinations: No  Change in or Loss of Energy: No  Hyperactivity: No  Confusion: No  Changes in hair: No  Changes in moles/birth marks: No  Itching: Yes  Rashes: No  Changes in nails: No  Acne: No  Hair in places you don't want it: No  Change in facial hair: No  Warts: No  Non-healing sores: No  Scarring: Yes  Flaking of skin: No  Color changes of hands/feet in cold : No  Sun sensitivity: No  Skin thickening: No  Eye pain: No  Vision loss: No  Dry eyes: Yes  Watery eyes: Yes  Eye bulging: No  Double vision: No  Flashing of lights: No  Spots: No  Floaters: Yes  Redness: No  Crossed eyes: No  Tunnel Vision: No  Yellowing of eyes: No  Eye irritation: No  Chest pain or pressure: No  Fast or irregular heartbeat: Yes  Pain in legs with walking: No  Trouble breathing while lying down: No  Fingers or toes appear blue: No  High blood pressure: No  Low blood pressure: No  Fainting: No  Murmurs: No  Pacemaker: No  Varicose veins: No  Edema or swelling: No  Wake up at night with shortness of breath: No  Light-headedness: No  Exercise intolerance: No  Heart burn or indigestion: Yes  Nausea: No  Vomiting: No  Abdominal pain: No  Bloating: Yes  Constipation: Yes  Diarrhea: Yes  Blood in stool: No  Black stools: No  Rectal or Anal pain: No  Fecal incontinence: No  Yellowing of skin or eyes: No  Vomit with blood: No  Change in stools: Yes  Trouble holding urine or incontinence: No  Pain or burning: No  Trouble starting or stopping: No  Increased frequency of urination: Yes  Blood in urine: No  Decreased frequency of urination: No  Frequent nighttime urination:  Yes  Flank pain: No  Difficulty emptying bladder: Yes  Bleeding or spotting between periods: No  Heavy or painful periods: Yes  Irregular periods: No  Vaginal discharge: Yes  Hot flashes: No  Vaginal dryness: No  Genital ulcers: No  Reduced libido: No  Painful intercourse: No  Difficulty with sexual arousal: No  Post-menopausal bleeding: No  Back pain: Yes  Muscle aches: Yes  Neck pain: Yes  Swollen joints: No  Joint pain: Yes  Bone pain: No  Muscle cramps: Yes  Muscle weakness: No  Joint stiffness: Yes  Bone fracture: No  Trouble with coordination: No  Dizziness or trouble with balance: Yes  Fainting or black-out spells: No  Memory loss: No  Headache: Yes  Seizures: No  Speech problems: No  Tingling: No  Tremor: No  Weakness: No  Difficulty walking: No  Paralysis: No  Numbness: No  Nervous or Anxious: Yes  Depression: No  Trouble sleeping: Yes  Trouble thinking or concentrating: Yes  Mood changes: No  Panic attacks: No

## 2022-10-11 NOTE — LETTER
10/11/2022         RE: Nithya Tolentino  [address] [facility] [address]        Dear Colleague,    Thank you for referring your patient, Nithya Tolentino, to the [facility]. Please see a copy of my visit note below.    Nithya Tolentino  is being evaluated via a billable video visit.      How would you like to obtain your AVS? MyChart  For the video visit, send the invitation by: Send to e-mail at: [email or web address]  Will anyone else be joining your video visit? No        Layla Richards CMA    Video-Visit Details    Type of service:  Video Visit    Video call duration:  Started: 9:11 am   Ended: 9:26 am     Originating Location (pt. Location): Home  Distant Location (provider location):  [facility]   Platform used for Video Visit: [email or web address] ([employer])    The patient is seen in f/up.     Nithya Tolentino is a 46 year old female with a past medical history significant for restless leg syndrome, allergies, dysmenorrhea, who was initially evaluated in our clinic in April 2022.   The patient had the thyroid hormone levels periodically checked over the years. TSH has been either minimally suppressed or in the lower half of normal range since 2013. On 3/23/2022, when it was 0.33, associated with a normal free T4 of 1.39, in the context of a EBV viremia.  The TSH spontaneously normalized to 0.54, on 3/28/2022.  Prior free T4 levels have always been normal.  Thyroid peroxidase antibodies were detectable but below threshold at 35, on 1/25/2022.  Thyroid-stimulating immunoglobulins were undetectable on 6/9/2022.  On subsequent lab work from 10/3/2022, TSH was normal at 0.43, while free T4 was 1.26.    The neck ultrasound from 3/24/2022, done for evaluation of cervical lymphadenopathy, revealed the following findings: Right thyroid lobe measuring 5.1 x 1.8 x 2 cm, left thyroid lobe measuring 4.8 x 1.6 x 1.7 cm, with an isthmus of 3 mm.  The echotexture was fairly homogeneous.  2  thyroid nodules were described, one located in the right superior pole measuring 0.6 cm, hypoechoic and 1 located in the inferior right thyroid pole, measuring 1.1 cm, iso/hyperechoic. The vascularity of the gland was not increased.  The right inferior thyroid nodule was hard to delineate.  There were no abnormal looking lymph nodes.  I reviewed the most recent thyroid ultrasound from 10/4/22.  The thyroid nodules have remained unchanged in size and ultrasound appearance.    Shortly after she recovered from EBV infection, she was diagnosed with COVID, in July 2022.  The patient has been seeing a kinesiologist and she was started on supplements which have helped with the energy level over the last 1 to 2 months.  She describes her energy as being back to her baseline level from 2 years ago.     She denies dysphagia, voice hoarseness.  She does endorse intermittent mild cough, related to allergies.  The eyes remain dry and they water frequently.  Restasis was recommended in the past and it did help but the patient was not able to afford the cost.  She has a follow-up appointment scheduled with ophthalmology in a couple of weeks.     Other pertinent labs:  3/20/22 CRP elevated at 8.4. it was normal in 1/2022 1/21/22 ferritin 103   Screening for RA negative 1/2022  Tissue transglutaminase antibodies negative in 2012, 2016 and 2022  Elevated B12 in January 2022  Normal vitamin D levels in 2013 in 2015  Elevated MCH on recent CBC from 3/23/2022    Past Medical History   Past Medical History:   Diagnosis Date     Dysmenorrhea      Environmental allergies    Sinusitis  Skin dermatofibroma  Ulnar tunnel syndrome   CTS   LBP - Degenerative changes of the lumbar spine  Restless leg syndrome     Past Surgical History   Past Surgical History:   Procedure Laterality Date     NO HISTORY OF SURGERY     [place] teeth surgery     Current Medications    Current Outpatient Medications:      acetaminophen (TYLENOL) 500 MG tablet, Take  1-2 tablets by mouth every 6 hours as needed 2 tablets before bedtime, Disp: , Rfl:      azelastine (ASTELIN) 0.1 % nasal spray, Spray 2 sprays into both nostrils 2 times daily, Disp: 30 mL, Rfl: 5     cholecalciferol (VITAMIN D3) 25 mcg (1000 units) capsule, Take 2 capsules by mouth daily, Disp: , Rfl:      fish oil-omega-3 fatty acids 1000 MG capsule, Take 2 g by mouth daily , Disp: , Rfl:      magnesium oxide 200 MG TABS, , Disp: , Rfl:      mometasone (NASONEX) 50 MCG/ACT nasal spray, Spray 2 sprays into both nostrils daily, Disp: 17 g, Rfl: 11     naproxen (NAPROSYN) 250 MG tablet, Take 250 mg by mouth 2 times daily (with meals), Disp: , Rfl:      norethindrone-ethinyl estradiol (AUROVELA FE 1/20) 1-20 MG-MCG tablet, Take 1 tablet by mouth daily, Disp: 84 tablet, Rfl: 2     selenium 50 MCG TABS tablet, Take 200 mcg by mouth daily, Disp: , Rfl:     Family History   Problem Relation Age of Onset     Thyroid Disease - hypothyroidism  Mother      Dementia Mother      Hypertension Mother      Anesthesia Reaction Father         difficulty coming out     Arthritis Maternal Grandmother         osteoarthritis      Cancer Maternal Grandfather         bone     Cerebrovascular Disease Maternal Grandfather         Passed in 1997     Neurologic Disorder Paternal Grandmother         brain tumor - benign      Hypertension Paternal Grandmother         Passed in 2007 after brain tumor surgery     Gastrointestinal Disease Other         cousin on dads side has celiac   Maternal aunt was diagnosed thyroid disease, multiple myeloma and amyloidosis. Another maternal aunt had part of the thyroid surgically removed.     Social History  .  She has 2 adopted children. Both have mental issues.  She denies smoking, drinking alcohol or using illicit drugs. Occupation: mental health therapist.     Review of Systems   Systemic:             Increased stress   Eye:                      Dry eyes for 5-6 years  Vishal-Laryngeal:     No  "dysphagia, some hoarseness since being dx with EB; no cough; \"globus pallidus\" present intermittently for the last month      Cardiovascular:    Palpitations present now once a week, seconds duration; no associated CP, SOB or dizziness; present mainly in the evening when she goes to bed   Gastrointestinal:   Episodes of diarrhea in the last month   Endocrine:            No cold or heat intolerance; MP regular on BCP; have been fairly regular prior to staring the BCP  Neurological:        No tremor; numbness and tingling related to CTS  Skin:                    Chronic dry skin, no hair falling out; intermittent itchiness below the left armpit for the last 2 years    Psychological:     Anxiety - longstanding    Vital Signs     Previous Weights:    Wt Readings from Last 10 Encounters:   10/03/22 81.9 kg (180 lb 8 oz)   06/08/22 82.6 kg (182 lb)   04/25/22 82.8 kg (182 lb 9.6 oz)   03/29/22 81.5 kg (179 lb 12 oz)   03/28/22 81.2 kg (179 lb)   03/24/22 82.6 kg (182 lb)   03/23/22 82.9 kg (182 lb 12.8 oz)   01/21/22 84.4 kg (186 lb)   03/30/21 80.3 kg (177 lb)   11/03/20 80.5 kg (177 lb 8 oz)        LMP 09/29/2022 (Exact Date)     Physical Exam  General Appearance: alert, no distress noted   Eyes: grossly normal to inspection, conjunctivae and sclerae normal, no lid lag or stare   Respiratory: no audible wheeze, cough, or visible cyanosis.  No visible retractions or increased work of breathing.  Able to speak fully in complete sentences.  Neurological: Cranial nerves grossly intact, mentation intact and speech normal; no tremor of the hands   Skin: no lesions on exposed skin   Psychological: mentation appears normal, affect normal, judgement and insight intact, normal speech and appearance well-groomed    Lab Results  I reviewed prior lab results documented in Epic.  TSH   Date Value Ref Range Status   10/03/2022 0.43 0.40 - 4.00 mU/L Final   03/28/2022 0.54 0.40 - 4.00 mU/L Final   03/23/2022 0.33 (L) 0.40 - 4.00 mU/L " Final   01/21/2022 0.43 0.40 - 4.00 mU/L Final   07/27/2021 0.71 0.40 - 4.00 mU/L Final   12/05/2019 0.65 0.40 - 4.00 mU/L Final   12/13/2018 0.66 0.40 - 4.00 mU/L Final   12/21/2017 0.40 0.40 - 4.00 mU/L Final   03/06/2017 0.31 (L) 0.40 - 4.00 mU/L Final   01/05/2017 0.37 (L) 0.40 - 4.00 mU/L Final       Assessment     Nithya Tolentino is a 46 year old female with a past medical history significant for restless leg syndrome, allergies, dysmenorrhea, referred for evaluation of abnormal TSH.     1. Low normal TSH   The patient has been having low normal or minimally suppressed TSH values since 2013.  Prior free T4 levels have been normal.  Most recent TSH was normal.  Thyroid peroxidase and thyroid-stimulating immunoglobulins have been negative.  The patient does have a history of dry and itchy eyes, but no proptosis.  There is a family history of hypothyroidism.  Differential diagnosis: Mild, clinically silent Graves' disease  No treatment necessary at this time.  Counseled the patient on signs and symptoms of hyperthyroidism and advised her to notify us if she notices them.  Otherwise, I recommended to have the thyroid hormone was checked on an annual basis.    2.  Thyroid nodules, with overall benign ultrasound features and stable follow-up ultrasound from 10/2022, when compared with prior March 2022.  I recommended a follow-up ultrasound in 1 year.  If stable, no further imaging tests are required.    Orders Placed This Encounter   Procedures     US Thyroid     TSH with free T4 reflex     Answers for HPI/ROS submitted by the patient on 10/10/2022  General Symptoms: Yes  Skin Symptoms: Yes  HENT Symptoms: Yes  EYE SYMPTOMS: Yes  HEART SYMPTOMS: Yes  LUNG SYMPTOMS: No  INTESTINAL SYMPTOMS: Yes  URINARY SYMPTOMS: Yes  GYNECOLOGIC SYMPTOMS: Yes  BREAST SYMPTOMS: No  SKELETAL SYMPTOMS: Yes  BLOOD SYMPTOMS: No  NERVOUS SYSTEM SYMPTOMS: Yes  MENTAL HEALTH SYMPTOMS: Yes  Ear pain: No  Ear discharge: No  Hearing loss:  No  Tinnitus: Yes  Nosebleeds: No  Congestion: Yes  Sinus pain: Yes  Trouble swallowing: No   Voice hoarseness: No  Mouth sores: No  Sore throat: No  Tooth pain: No  Gum tenderness: No  Bleeding gums: No  Change in taste: No  Change in sense of smell: No  Dry mouth: No  Hearing aid used: No  Neck lump: No  Fever: No  Loss of appetite: No  Weight loss: No  Weight gain: Yes  Fatigue: Yes  Night sweats: No  Chills: No  Increased stress: Yes  Excessive hunger: No  Excessive thirst: No  Feeling hot or cold when others believe the temperature is normal: No  Loss of height: No  Post-operative complications: No  Surgical site pain: No  Hallucinations: No  Change in or Loss of Energy: No  Hyperactivity: No  Confusion: No  Changes in hair: No  Changes in moles/birth marks: No  Itching: Yes  Rashes: No  Changes in nails: No  Acne: No  Hair in places you don't want it: No  Change in facial hair: No  Warts: No  Non-healing sores: No  Scarring: Yes  Flaking of skin: No  Color changes of hands/feet in cold : No  Sun sensitivity: No  Skin thickening: No  Eye pain: No  Vision loss: No  Dry eyes: Yes  Watery eyes: Yes  Eye bulging: No  Double vision: No  Flashing of lights: No  Spots: No  Floaters: Yes  Redness: No  Crossed eyes: No  Tunnel Vision: No  Yellowing of eyes: No  Eye irritation: No  Chest pain or pressure: No  Fast or irregular heartbeat: Yes  Pain in legs with walking: No  Trouble breathing while lying down: No  Fingers or toes appear blue: No  High blood pressure: No  Low blood pressure: No  Fainting: No  Murmurs: No  Pacemaker: No  Varicose veins: No  Edema or swelling: No  Wake up at night with shortness of breath: No  Light-headedness: No  Exercise intolerance: No  Heart burn or indigestion: Yes  Nausea: No  Vomiting: No  Abdominal pain: No  Bloating: Yes  Constipation: Yes  Diarrhea: Yes  Blood in stool: No  Black stools: No  Rectal or Anal pain: No  Fecal incontinence: No  Yellowing of skin or eyes: No  Vomit with  blood: No  Change in stools: Yes  Trouble holding urine or incontinence: No  Pain or burning: No  Trouble starting or stopping: No  Increased frequency of urination: Yes  Blood in urine: No  Decreased frequency of urination: No  Frequent nighttime urination: Yes  Flank pain: No  Difficulty emptying bladder: Yes  Bleeding or spotting between periods: No  Heavy or painful periods: Yes  Irregular periods: No  Vaginal discharge: Yes  Hot flashes: No  Vaginal dryness: No  Genital ulcers: No  Reduced libido: No  Painful intercourse: No  Difficulty with sexual arousal: No  Post-menopausal bleeding: No  Back pain: Yes  Muscle aches: Yes  Neck pain: Yes  Swollen joints: No  Joint pain: Yes  Bone pain: No  Muscle cramps: Yes  Muscle weakness: No  Joint stiffness: Yes  Bone fracture: No  Trouble with coordination: No  Dizziness or trouble with balance: Yes  Fainting or black-out spells: No  Memory loss: No  Headache: Yes  Seizures: No  Speech problems: No  Tingling: No  Tremor: No  Weakness: No  Difficulty walking: No  Paralysis: No  Numbness: No  Nervous or Anxious: Yes  Depression: No  Trouble sleeping: Yes  Trouble thinking or concentrating: Yes  Mood changes: No  Panic attacks: No          Again, thank you for allowing me to participate in the care of your patient.        Sincerely,        Myra Amador MD

## 2022-10-11 NOTE — TELEPHONE ENCOUNTER
Spoke to patient, she is unable to schedule at this time because she is driving. Sending MyChart with info to schedule.  Layla Richards, CMA

## 2022-10-17 ENCOUNTER — TELEPHONE (OUTPATIENT)
Dept: ENDOCRINOLOGY | Facility: CLINIC | Age: 47
End: 2022-10-17

## 2022-10-17 NOTE — TELEPHONE ENCOUNTER
Left Voicemail (1st Attempt) for the patient to call back and schedule the following:    Appointment type: return   Provider: dr. altman   Return date: 10/11/2023   Specialty phone number: [phone]   Additional appointment(s) needed: labs and ultrasound prior   Additonal Notes: Return in about 1 year (around 10/11/2023) for labs prior to f/up apt, thyroid US prior to f/up apt.    Bethany card Procedure   Orthopedics, Podiatry, Sports Medicine, Ent ,Eye , Audiology, Adult Endocrine & Diabetes, Nutrition & Medication Therapy Management Specialties   [facility]

## 2022-10-18 ENCOUNTER — VIRTUAL VISIT (OUTPATIENT)
Dept: ONCOLOGY | Facility: CLINIC | Age: 47
End: 2022-10-18
Payer: COMMERCIAL

## 2022-10-18 DIAGNOSIS — R59.1 LA (LYMPHADENOPATHY): Primary | ICD-10-CM

## 2022-10-18 DIAGNOSIS — B27.90 EBV INFECTION: ICD-10-CM

## 2022-10-18 PROCEDURE — 99214 OFFICE O/P EST MOD 30 MIN: CPT | Mod: GT | Performed by: INTERNAL MEDICINE

## 2022-10-18 NOTE — LETTER
10/18/2022         RE: Nithya Tolentino   [address] [facility] [address]        Dear Colleague,    Thank you for referring your patient, Nithya Tolentino, to the [facility] [place] [facility]. Please see a copy of my visit note below.    [facility] Hematology / Oncology  Progress Note  Name: Nithya Tolentino  :  1975    MRN:  [id number]    --------------------    Assessment / Plan:  Nithya fortunately has had resolution of her adenopathy and transaminitis as her Benitez-Barr virus settles down and has resolved.  She remains well and markedly improved but unfortunately ways to go.  She rightfully points out that the biggest things affecting her right now are social stressors in her life.  We agreed that with resolution in her adenopathy as well as symptomatology following Benitez-Barr virus, we will plan to see her back as needed.  Adenopathy seen on her recent imaging appears improved; we reviewed the lung nodule; she is a non-smoker.  Our door remains open for her.    Kev Urban MD    --------------------    Interval History:  Nithya returns for follow up of adenopathy.  Since our last visit, Nithya has improved.  She still feels like she is not quite back to her baseline but understands that it probably is just a matter of time and dealing with some chronic fatigue after Benitez-Barr virus.  The lymph nodes have all gone away.  No unexplained fevers, chills or sweats.  No nausea or vomiting.  Stable weight, appetite and energy.  Lots of social stressors in her life.    --------------------    Physical Exam:  Video visit.    Labs / Imaging:  We reviewed CBC, LDH.  We reviewed her recent CT scan.    Video Visit:  Nithya is a 46 year old female who is being evaluated via a billable video visit.  }    Video start time: 11:40 AM  Video end time: 12:58 AM    Provider location: [facility]  Patient location: Home    Mode of transmission:  Portal /  Marielle.        Again, thank you for allowing me to participate in the care of your patient.        Sincerely,        Kev Urban MD

## 2022-10-18 NOTE — NURSING NOTE
Patient verified medications and allergies are correct via eCheck-in. Patient confirms no changes at this time and/or since last reviewed by clinic staff.    My Tolentino, Virtual Facilitator

## 2022-10-18 NOTE — PROGRESS NOTES
[facility] Hematology / Oncology  Progress Note  Name: Nithya Tolentino  :  1975    MRN:  [id number]    --------------------    Assessment / Plan:  Nithya fortunately has had resolution of her adenopathy and transaminitis as her Benitez-Barr virus settles down and has resolved.  She remains well and markedly improved but unfortunately ways to go.  She rightfully points out that the biggest things affecting her right now are social stressors in her life.  We agreed that with resolution in her adenopathy as well as symptomatology following Benitez-Barr virus, we will plan to see her back as needed.  Adenopathy seen on her recent imaging appears improved; we reviewed the lung nodule; she is a non-smoker.  Our door remains open for her.    Kev Urban MD    --------------------    Interval History:  Nithya returns for follow up of adenopathy.  Since our last visit, Nithya has improved.  She still feels like she is not quite back to her baseline but understands that it probably is just a matter of time and dealing with some chronic fatigue after Benitez-Barr virus.  The lymph nodes have all gone away.  No unexplained fevers, chills or sweats.  No nausea or vomiting.  Stable weight, appetite and energy.  Lots of social stressors in her life.    --------------------    Physical Exam:  Video visit.    Labs / Imaging:  We reviewed CBC, LDH.  We reviewed her recent CT scan.    Video Visit:  Nithya is a 46 year old female who is being evaluated via a billable video visit.  }    Video start time: 11:40 AM  Video end time: 12:58 AM    Provider location: [facility]  Patient location: Home    Mode of transmission: Oscar / [employer].

## 2022-10-20 ENCOUNTER — PATIENT OUTREACH (OUTPATIENT)
Dept: CARE COORDINATION | Facility: CLINIC | Age: 47
End: 2022-10-20

## 2022-10-20 NOTE — PROGRESS NOTES
Oncology Distress Screening Follow-up  Clinical Social Work  [facility]    Identified Concern and Score From Distress Screenin. How concerned are you about your ability to eat?  0       2. How concerned are you about unintended weight loss or your current weight?  0       3. How concerned are you about feeling depressed or very sad?  5       4. How concerned are you about feeling anxious or very scared?  3       5. Do you struggle with the loss of meaning and naz in your life?  Not at all       6. How concerned are you about work and home life issues that may be affected by your cancer?  8 Abnormal        7. How concerned are you about knowing what resources are available to help you?  1       8. Do you currently have what you would describe as Sabianist or spiritual struggles?             Not at all             Date of Distress Screening: 10/18/22      Intervention/Education Provided:: ANA called and left message, introducing self and reason for call. ANA provided contact information and enouraged Divya to call back when they are able to discuss support and resource needs.       Follow-up Required: SW will await Divya's call back.         TARIK Baker, LICSW, OSW-C  Clinical , Adult Oncology  Phone: [phone]

## 2022-10-25 ENCOUNTER — ANCILLARY PROCEDURE (OUTPATIENT)
Dept: GENERAL RADIOLOGY | Facility: OTHER | Age: 47
End: 2022-10-25
Attending: NURSE PRACTITIONER
Payer: COMMERCIAL

## 2022-10-25 DIAGNOSIS — M77.8 TENDONITIS OF FINGER: ICD-10-CM

## 2022-10-25 PROCEDURE — 73140 X-RAY EXAM OF FINGER(S): CPT | Mod: TC | Performed by: RADIOLOGY

## 2022-11-08 ENCOUNTER — OFFICE VISIT (OUTPATIENT)
Dept: NEUROPSYCHOLOGY | Facility: CLINIC | Age: 47
End: 2022-11-08
Attending: STUDENT IN AN ORGANIZED HEALTH CARE EDUCATION/TRAINING PROGRAM
Payer: COMMERCIAL

## 2022-11-08 DIAGNOSIS — R41.3 COMPLAINTS OF MEMORY DISTURBANCE: Primary | ICD-10-CM

## 2022-11-08 PROCEDURE — 96133 NRPSYC TST EVAL PHYS/QHP EA: CPT

## 2022-11-08 PROCEDURE — 96132 NRPSYC TST EVAL PHYS/QHP 1ST: CPT

## 2022-11-08 PROCEDURE — 90791 PSYCH DIAGNOSTIC EVALUATION: CPT

## 2022-11-08 PROCEDURE — 96138 PSYCL/NRPSYC TECH 1ST: CPT

## 2022-11-08 PROCEDURE — 96139 PSYCL/NRPSYC TST TECH EA: CPT

## 2022-11-08 NOTE — NURSING NOTE
The patient was seen for neuropsychological evaluation at the request of Dr. Osmel Manzanares, for the purposes of diagnostic clarification and treatment planning. 183 minutes of test administration and scoring were provided by this writer, Zay Driver. Please see Dr. Alix White's report for a full interpretation of the findings.

## 2022-11-08 NOTE — LETTER
2022       RE: Nithya Tolentino   [address] [place] [facility] [address]     Dear Colleague,    Thank you for referring your patient, Nithya Tolentino, to the [facility] NEUROPSYCHOLOGY [place] [facility]. Please see a copy of my visit note below.    NEUROPSYCHOLOGICAL EVALUATION  **CONFIDENTIAL**    Name: Nithya Tolentino Education: Master s degree (18 years)    (age): 1975 (46 years) CHEN:  2022   Referral: Osmel Manzanares MD  Department of Neurology Handedness:  MRN (Epic): Right  [id number]     IDENTIFYING INFORMATION AND REASON FOR REFERRAL   Ms. Tolentino is a 46-year-old, right-handed, White female with a history of anxiety and Benitez-Barr viral infection (diagnosed 3/2022). This evaluation was requested to provide a comprehensive assessment of her current neuropsychological status to inform treatment planning.     IMPRESSION  See below for relevant background information and detailed test results. See separate abstract for supporting documentation including a list of neuropsychological measures and test scores.    Ms. Tolentino s neuropsychological profile revealed performance within expectation across all cognitive tests administered including immediate auditory attention and working memory, speeded processing, language, visuospatial processing, learning and memory, abstract reasoning, mental flexibility, and bilateral fine-motor dexterity. A strength was noted on a test of verbal abstract reasoning with performance in the exceptionally high range (>97th %ile).     Ms. Tolentino stated her mood is  okay , her  anxiety is gone , and she denied significant psychological stress on clinical interview. In contrast, assessment of current psychological and emotional status through self-report measures revealed moderate symptoms of depression and anxiety. On a self-report measure of personality and psychopathology, responses were suggestive  of possible overreporting of somatic and/or cognitive/memory complaints. She endorsed a general sense of malaise and a diffuse pattern of cognitive difficulties. Her responses also indicated significant emotional distress including a lack of positive emotional experiences, significant anhedonia, lack of interest, feeling helpless and/or hopeless, an above average level of stress, excessive worry, and social avoidance.     Overall, Ms. Tolentino is functioning quite well from a cognitive standpoint and does not evidence any objective cognitive deficits. Her cognitive profile is not suggestive of focal or lateralized cerebral dysfunction, and it does not raise concerns about acquired neurologic disease. She presented with a degree of cognitive concerns that are not evident on formal neuropsychological testing. It seems that she has moderate symptoms of anxiety and depression, which may represent symptoms that are quite understandably a result of significant psychosocial stress. Her subjective cognitive complaints are likely attributable to cognitive lapses due to stress, anxiety, and depression, which intermittently interfere with cognitive effectiveness, typically through interfering with normal attention and efficiency of information processing. These difficulties can compromise other aspects of cognition, such as remembering conversations, attending to multiple tasks at once, and organizing thoughts and speech. Sleep disturbance, fatigue, and malaise, possibly related to ongoing Benitez-Barr viral infection, are also likely contributory to her subjective cognitive complaints.     RECOMMENDATIONS  1. Ms. Tolentino reported moderate symptoms of anxiety and depression as well as significant stress. She is encouraged to continue regular engagement in individual psychotherapy to address these mental health symptoms.  2. Ms. Tolentino is encouraged to live a healthy lifestyle that promotes brain health including getting  appropriate exercise and eating healthy.    3. Ms. Tolentino is encouraged to utilize the following behavioral strategies to maximize cognitive functioning in her daily life:   -Eliminate distraction. Eliminating environmental distracters can facilitate attention and memory performance. For example, turning off music or the television while having a conversation, so that all of your attention is focused on the task at hand.  -Work on decreasing interference from intrusive thoughts. When intrusive thoughts begin to interfere with your thinking, do not concentrate on them. Instead, observe them passively and calmly redirect your attention back to task.   -Engage in calming activities that increase focus on the present. Incorporating mindfulness techniques such as meditation, relaxation, yoga, and moderate cardiovascular exercise, into your daily routine will help keep you present-oriented and consequently improve attention and memory.  -Pay mindful attention. You may find that you need to make a conscious effort to pay attention to conversations or when learning new information. When attention is not focused, it is difficult for the brain to learn new information. Thus, making a mindful effort to pay attention as you go through daily tasks will assist and facilitate memory recall later on.  -Allow for time. Take the time needed to learn and recall information. Some people tend to worry if they can't immediately recall something. Instead, you should take time to express a thought or complete a task rather than be critical or harsh on yourself.  -Use external aids to increase structure in daily life. Ongoing use of compensatory strategies such as lists, notes, and a centralized calendar are supported. Tools such as smart phones with alarms and reminders are helpful in bringing structure to daily activities.  -Practice good sleep hygiene. Poor sleep can exacerbate cognitive difficulties and interfere with attention and  memory. The sleep foundation has several recommendations for improving sleep quality including:  -Set a sleep schedule with a nightly routine and fixed wakeup time. Soft music, light stretching, reading and/or relaxation exercises (e.g., meditation, deep breathing) can be helpful to wind down before bed. Avoid bright lights and electronics at least 30 minutes before bed.  -Keep naps short and limited to the early afternoon.  -Avoid caffeine in the afternoon or evening. Avoid eating dinner late.  -Get daylight exposure (sunlight) during the day to encourage quality sleep at night  -Optimize your bedroom with a comfortable mattress, pillow, and bedding; use heavy curtains or an eye mask to prevent light from interrupting your sleep. Light smells, such as lavender, can induce a calmer state of mind and cultivate a positive space for sleep.    -For more information, a simple set of guidelines can be found here: https://www.sleepfoundation.org/sleep-topics/sleep-hygiene  4. The current evaluation will serve as an objective cognitive baseline against which results of future evaluations may be compared.  No follow-up is currently indicated; however, Ms. Tolentino may be referred for a repeat neuropsychological evaluation if there is significant change in cognitive status in the future.     Thank you for the opportunity to participate in Ms. Tolentino s care.  These findings and recommendations were reviewed with the patient in a separate feedback session on 11/11/2022 and all her questions were answered. If you have any questions regarding this evaluation, please do not hesitate to contact me.       Alix White, Ph.D.,   Clinical Neuropsychologist    RELEVANT BACKGROUND INFORMATION AND SUPPORTING DOCUMENTATION  Gathered from a clinical interview with the patient and reviews of electronic medical record.    History of Presenting Problem(s)  Ms. Tolentino presented with a history of anxiety and Benitez-Barr viral infection  (diagnosed 3/2022). She reported cognitive complaints for the past 2-3 years in the context of significant stress. She is the mother of 2 adopted teenagers with fetal alcohol spectrum disorder. She stated her son has a longstanding history of challenging behavior, but this became increasingly difficult in 2019 with several stressors occurring since then. Cognitively, she described difficulty recalling details of conversations and/or if conversations even happened. She reported trouble with focus/concentration, word finding difficulties, and trouble with executive functions such as organizing, planning, and multitasking. She reported difficulty filtering information and described an instance recently of trouble preparing a meal at a party with a lot of people talking around her. She also reported other errors occasionally such mixing up tablespoon and teaspoon when preparing food. She described difficulty with focus when driving and stated it is now more mentally taxing. She denied difficulties managing finances on autopay, medications, simple meal preparations, and basic self-care. She reported her mother has been diagnosed with early onset dementia and she feels as though she has a lot of the same symptoms such as exhaustion after cognitively stimulating activity. She described brain fog, body aches, and exhaustion with onset around December 2021 and was later diagnosed with Benitez-Barr viral infection in March 2022. She stated her energy improved significantly around September; however, 2 weeks before this evaluation she began experiencing exhaustion and reduced appetite and she believes the viral infection has been reactivated in the context of stress. She started taking supplements again, which were helpful previously, and she has reportedly been improving recently; however, she stated she feels worse now than in September. She reported physical changes characterized by being clumsier, dropping things, needing  to concentrate more when holding things, missing steps on the stairs, occasional falls, and difficulty with proprioception. Emotionally, she stated feeling  okay . She reported a longstanding history of anxiety but reported she completed [facility] therapy in 2020 and her  anxiety is gone.  She described feeling irritable at times in the context of significant psychosocial stress, but she stated she's not depressed, and she denied other symptoms of psychological distress.     Neurodiagnostic Findings   ? Brain MRI w/wo contrast (6/14/2022) IMPRESSION: 1.  No acute infarct, acute intracranial hemorrhage, or intracranial mass.    Medical History  ? Benitez-Barr viral infection (3/2022; reported possible reactivation 2 weeks before this evaluation)     Health Behaviors   ? Sleep: ~7 hours of cumulative sleep nightly; occasional delay in onset since EBV infection; ~1 night per week of waking with difficulty returning to sleep; infrequent snoring but denied gasping arousals; longstanding history of RLS treated with nightly naproxen; denied parasomnic behaviors; energy is low recently requiring daytime naps  ? Exercise: Walking dog occasionally, working in the yard, daily stretching  ? Pain: Back and shoulder pain rated 1 or 2/10.     Psychiatric History  ? Ms. Tolentino reported current mood is  okay   ? She reported a history of anxiety and described panic attacks in high school and college; she reported her anxiety is gone following [facility] therapy.   ? She described a lot of psychosocial stress related to her son with FASD and other stressors, particularly in the past 3 years.  ? She otherwise denied history of significant depressive, hypomanic or manic mood symptoms, alteration of sensory perceptual processes or disordered thought.  ? Suicidality/ Self-harm: She reported a remote history of passive suicidal thoughts in college but adamantly denied any recent thoughts of suicide or history of suicidal ideation,  plan, or intent.   ? Psychiatric treatment: Participated in [facility] therapy in      Substance Use History  ? Alcohol: None  ? Nicotine: None  ? Cannabis: None  ? Problematic Substance Use: Denied    Current Medications (per EMR)    acetaminophen (TYLENOL) 500 MG tablet (PRN)     azelastine (ASTELIN) 0.1 % nasal spray     cholecalciferol (VITAMIN D3) 25 mcg (1000 units) capsule     fish oil-omega-3 fatty acids 1000 MG capsule     magnesium oxide 200 MG TABS     mometasone (NASONEX) 50 MCG/ACT nasal spray     naproxen (NAPROSYN) 250 MG tablet     norethindrone-ethinyl estradiol (AUROVELA FE ) 1-20 MG-MCG tablet     selenium 50 MCG TABS tablet     Developmental, Educational, & Occupational History  ? Gestational: Full-term  ?  complications:  Something was wrong  but she was unsure what the specific  complication was; no post-birth complications or treatment required  ? Developmental milestones: Met at expected ages  ? Childhood behavioral / emotional / academic problems: Difficulties with attention; however, she reported being smart enough to cope with these difficulties and did well in school  ? Native Language: English  ? Education: Graduated high school on time with mostly As; obtained bachelor s degree in psychology, master s degree in counseling psychology, and a post-graduate certificate.  ? Occupation: Therapist    Psychosocial History  ? Born in [place], [place] and raised in [place], [place]  ? Marital:  to spouse of 19 years  ? Children: 2 adopted children with FASD (ages 17 and 19)  ? Housing: Lives with spouse and 17-year-old son  ? Psychosocial support: Strong social support network of spouse and a couple of close friends    Family History  ?  Mother (early onset dementia, diagnosed in her 60s)    RESULTS  See separate abstract for list of neuropsychological measures and test scores. Descriptive ranges are based on American Academy of Clinical Neuropsychology (2020) consensus  guidelines, or test manuals where appropriate. All standardized scores are adjusted for age and additional adjustments for demographic factors such as education were performed where applicable.    PRE-MORBID ABILITY: Premorbid abilities were estimated within the high average range based on single word reading ability and demographic factors including sex, ethnicity, education, occupation, and geographic region.  GENERAL COGNITIVE STATUS: Orientation was within expectation for general personal information, time, place, and cultural information.   ATTENTION/EXECUTIVE FUNCTIONS: Immediate auditory attention and working memory performances were average. Verbal abstract reasoning performance was exceptionally high. Visual reasoning through pattern identification was average. Performance on a test of set-shifting/cognitive flexibility was average. Psychomotor processing speed performances were average.  LANGUAGE: Confrontation naming performance was average. Letter-cue verbal fluency performance was average. Semantic verbal fluency performance was average.   VISUOSPATIAL PROCESSING: Performance on a spatial perception task requiring judgement of angled lines was high average. Copy of increasingly complex figures was average.   LEARNING AND MEMORY: Initial encoding of a word list over multiple learning trials was average. Delayed recall of the list was average. Recognition of the word list was average. Initial encoding of contextualized verbal information in the form of short stories was average and delayed retrieval was average. Recognition of story details was high average. Encoding of visual information was high average and delayed recall was average. Recognition among distractors was high average and without error.    MOTOR: Fine-motor dexterity was low average with the dominant (right) hand, and average with the non-dominant (left) hand. Motor findings were not clearly lateralized.   PSYCHOLOGICAL AND BEHAVIORAL: She  endorsed moderate symptoms of anxiety and moderate symptoms of depression on self-report questionnaires. On a self-report measure of personality and psychopathology, responses were suggestive of possible overreporting of symptoms as indicated by an unusual number of somatic and/or cognitive symptoms and noncredible memory complaints. Thus, her notable elevations suggesting multiple somatic complaints should be interpreted cautiously. Specifically, she endorsed a general sense of malaise manifested in poor health and feeling tired, weak, and incapacitated, in addition to a diffuse pattern of cognitive difficulties including memory problems, difficulties with attention and concentration, and possible confusion. Her responses also indicate significant emotional distress. She endorsed a lack of positive emotional experiences, significant anhedonia, and lack of interest. She reported an above average level of stress and excessive worry including worries about misfortune, finances, as well as preoccupation with disappointments. She endorsed feeling helpless and/or hopeless and pessimistic. She also reported not enjoying social events and avoiding social situations, including parties and other events where crowds are likely to gather.    PERFORMANCE VALIDITY: Performance on neuropsychological tests is dependent upon a number of factors, including sufficient engagement and motivation, to reliably establish an examinee s level of cognitive functioning.  Based upon observations made throughout the evaluation, the patient did not appear to deliberately perform in a suboptimal manner and demonstrated good frustration tolerance on cognitively challenging tasks. Scores on dedicated and imbedded indices of performance validity were in the valid range. Overall, test results are believed to accurately represent the patient s current neurocognitive status.    BEHAVIORAL OBSERVATIONS  ? Presented on time and was  unaccompanied  ? Alert, oriented to self, time, place, and circumstance; attentive and focused while undergoing testing  ? Appearance: Well-groomed, casually dressed  ? Gait/Posture: No abnormalities noted  ? Sensorimotor: No abnormalities noted  ? Behavior: Cooperative, pleasant, no behavioral abnormalities noted  ? Speech: Fluent, articulate, normal rate, prosody, and volume; no conversational word finding difficulty  ? Thought process: Logical, linear, and goal-directed   ? Thought content: Logical, appropriate   ? Affect: Broad, responsive, consistent with reported mood; good eye contact  ? Mood: Anxious, depressed  ? Insight and Judgment: Intact  ? Approach to testing: Efficient, deliberate; good frustration on cognitively demanding tasks  ? Rapport: Easily established and maintained      Activities included in this evaluation: CPT Code #Units   Psychiatric diagnostic interview [id number]   Test evaluation services by professional; first hour. [id number]   Test evaluation services by professional, additional hour (+) [id number]   Test administration and scoring by technician, first 30 mins [id number] 1   Test administration and scoring by technician, additional 30 mins (+) [id number]   Dx: R41.3        Again, thank you for allowing me to participate in the care of your patient.      Sincerely,    JERO MCCORD, PhD

## 2022-11-11 NOTE — PROGRESS NOTES
Provider: CE      Tech: KANIKA      Patient Name: Nithya Tolentino     : 75      MRN: [id number]      CHEN: 22      Age: 46      Education: 18      Ethnicity: C      Handedness: Right      Station: OP             NEUROPSYCHOLOGICAL TESTS RAW SCORE STANDARDIZED SCORE* DESCRIPTIVE RANGE**   PREMORBID ABILITY       WAIS-IV ACS Test of Premorbid Functioning (Estimated FSIQ) 62 SS= 119 High Average     Estimated FSIQ = 115 High Average          ATTENTION AND EXECUTIVE FUNCTIONS RAW SCORE STANDARDIZED SCORE* DESCRIPTIVE RANGE**   Wechsler Adult Intelligence Scale - 4th Edition (WAIS-IV)      Digit Span Forward 11 ss= 11 Average   Digit Span Backward 10 ss= 11 Average   Digit Span Sequencing 9 ss= 11 Average   Digit Span Total 30 ss= 11 Average     TSs,r,e = 49 Average   Coding 86 ss= 14 Above Average     TSs,r,e = 56 Average   Similarities 35 ss= 17 Exceptionally High     TSs,r,e = 70 Exceptionally High   Matrix Reasoning 22 ss= 14 Above Average     TSs,r,e = 55 Average   Trail Making Test (Time/errors)        Part A s,r,e 25/0 TS= 46 Average   Part B s,r,e 55/0 TS= 49 Average          LANGUAGE RAW SCORE STANDARDIZED SCORE* DESCRIPTIVE RANGE**   Neuropsychological Assessment Battery (NAB) Naming s,e 31 TS= 53 Average   Letter-Cue Verbal Fluency (FAS) s,r,e 14-18-19 (51) TS= 52 Average   Animal Fluency s,r,e 23 TS=  49 Average          VISUOSPATIAL PROCESSING RAW SCORE STANDARDIZED SCORE* DESCRIPTIVE RANGE**   RBANS       Line Orientation 20 %ile= >75 High Average   WMS-IV Visual Reproduction Copy 42 %= 26-50 Average          LEARNING & MEMORY RAW SCORE STANDARDIZED SCORE* DESCRIPTIVE RANGE**   Wechsler Memory Scale-4th Edition (WMS-IV)      Logical Memory I 34 ss= 13 High Average     TSs,r,e = 54 Average   Logical Memory II 26 ss= 12 High Average     TSs,r,e = 50 Average   Logical Memory Recognition 26 %= >75 High Average   Visual Reproduction I 42 ss= 14 Above Average     TSs,r,e = 61 High Average   Visual  Reproduction II 26 ss= 10 Average     TSs,r,e = 47 Average   Visual Reproduction Recognition 7 %= >75 High Average   [place] Verbal Learning Test - 3rd Edition (CVLT-3; Standard Form)     Total Trials 1-5 se 0-15-02-12-15 (57) TS=  50 Average   Trial 1 se 7 TS= 49 Average   Trial 5se 15 TS= 55 Average   Learning [place] 1.7 ss= 12 High Average   SD Free Recall se 12 TS= 51 Average   SD Cued Recall se 12 TS= 45 Average   LD Free Recall se 13 TS= 51 Average   LD Cued Recall se 12 TS= 45 Average   Total Intrusions se 0 TS= 59 High Average   Total Recognition Hits se 14 TS= 37 Low Average   False Positives se 0 TS= 55 Average   d' se 3.3 TS= 45 Average          MOTOR  RAW SCORE STANDARDIZED SCORE* DESCRIPTIVE RANGE**   Grooved Pegboard Test       RH s,r,e 67 TS= 41 Low Average   LH s,r,e 70 TS= 46 Average          PSYCHOLOGICAL & BEHAVIORAL SYMPTOMS RAW SCORE STANDARDIZED SCORE* DESCRIPTIVE RANGE**   [place] Anxiety Inventory (ROBER) 18 --  Moderate   [place] Depression Inventory - 2nd Edition (BDI-II) 22 --  Moderate   [place] Multiphasic Personality Inventory - 3rd Edition (MMPI-3)     Validity (CRIN, VRIN, HECTOR, F, Fp, Fs, FBS, RBS, L, K) -- TS= 51, 55, 54, 56, 50, 64, 84, 80, 44, 47 Valid   Substantive Scales (T?65 in order of elevation) RC1(77), MLS(77), VELVET(77), INTR(77), STR(76), COG(76), WRY(72), RC2(68), MENG(65), HLP(65) Elevated          PERFORMANCE VALIDITY RAW SCORE   DESCRIPTIVE RANGE**   FC 16/16 --  Valid Range   RDS 10 --  Valid Range   ACS Word Choice 50 --  Valid Range          * All standardized scores are adjusted for age. Superscripts denote additional adjustment for (s)ex, (r)ace/ethnicity, (l)anguage, and/or (e)ducation.   ** Descriptive ranges are based on American Academy of Clinical Neuropsychology (2020) consensus guidelines, or test manuals where appropriate.    WNL = within normal limits. DC = discontinued due to patient s inability to complete the test.     Standardized scores: TS = T-score;  mean = 50, standard deviation =10; z = z-score; mean = 0, standard deviation = 1; ss = scaled score; mean = 10, standard deviation = 3; MAS = [place] Older Adult Normative Study age adjusted scaled score; mean = 10, standard deviation = 3; SS = standard score; mean = 100, standard deviation = 15.

## 2022-11-11 NOTE — PROGRESS NOTES
NEUROPSYCHOLOGICAL EVALUATION  **CONFIDENTIAL**    Name: Nithya Tolentino Education: Master s degree (18 years)    (age): 1975 (46 years) CHEN:  2022   Referral: Osmel Manzanares MD  Department of Neurology Handedness:  MRN (Epic): Right  [id number]     IDENTIFYING INFORMATION AND REASON FOR REFERRAL   Ms. Tolentino is a 46-year-old, right-handed, White female with a history of anxiety and Benitez-Barr viral infection (diagnosed 3/2022). This evaluation was requested to provide a comprehensive assessment of her current neuropsychological status to inform treatment planning.     IMPRESSION  See below for relevant background information and detailed test results. See separate abstract for supporting documentation including a list of neuropsychological measures and test scores.    Ms. Tolentino s neuropsychological profile revealed performance within expectation across all cognitive tests administered including immediate auditory attention and working memory, speeded processing, language, visuospatial processing, learning and memory, abstract reasoning, mental flexibility, and bilateral fine-motor dexterity. A strength was noted on a test of verbal abstract reasoning with performance in the exceptionally high range (>97th %ile).     Ms. Tolentino stated her mood is  okay , her  anxiety is gone , and she denied significant psychological stress on clinical interview. In contrast, assessment of current psychological and emotional status through self-report measures revealed moderate symptoms of depression and anxiety. On a self-report measure of personality and psychopathology, responses were suggestive of possible overreporting of somatic and/or cognitive/memory complaints. She endorsed a general sense of malaise and a diffuse pattern of cognitive difficulties. Her responses also indicated significant emotional distress including a lack of positive emotional experiences, significant anhedonia, lack of interest,  feeling helpless and/or hopeless, an above average level of stress, excessive worry, and social avoidance.     Overall, Ms. Tolentino is functioning quite well from a cognitive standpoint and does not evidence any objective cognitive deficits. Her cognitive profile is not suggestive of focal or lateralized cerebral dysfunction, and it does not raise concerns about acquired neurologic disease. She presented with a degree of cognitive concerns that are not evident on formal neuropsychological testing. It seems that she has moderate symptoms of anxiety and depression, which may represent symptoms that are quite understandably a result of significant psychosocial stress. Her subjective cognitive complaints are likely attributable to cognitive lapses due to stress, anxiety, and depression, which intermittently interfere with cognitive effectiveness, typically through interfering with normal attention and efficiency of information processing. These difficulties can compromise other aspects of cognition, such as remembering conversations, attending to multiple tasks at once, and organizing thoughts and speech. Sleep disturbance, fatigue, and malaise, possibly related to ongoing Benitez-Barr viral infection, are also likely contributory to her subjective cognitive complaints.     RECOMMENDATIONS  1. Ms. Tolentino reported moderate symptoms of anxiety and depression as well as significant stress. She is encouraged to continue regular engagement in individual psychotherapy to address these mental health symptoms.  2. Ms. Tolentino is encouraged to live a healthy lifestyle that promotes brain health including getting appropriate exercise and eating healthy.    3. Ms. Tolentino is encouraged to utilize the following behavioral strategies to maximize cognitive functioning in her daily life:   -Eliminate distraction. Eliminating environmental distracters can facilitate attention and memory performance. For example, turning off music or  the television while having a conversation, so that all of your attention is focused on the task at hand.  -Work on decreasing interference from intrusive thoughts. When intrusive thoughts begin to interfere with your thinking, do not concentrate on them. Instead, observe them passively and calmly redirect your attention back to task.   -Engage in calming activities that increase focus on the present. Incorporating mindfulness techniques such as meditation, relaxation, yoga, and moderate cardiovascular exercise, into your daily routine will help keep you present-oriented and consequently improve attention and memory.  -Pay mindful attention. You may find that you need to make a conscious effort to pay attention to conversations or when learning new information. When attention is not focused, it is difficult for the brain to learn new information. Thus, making a mindful effort to pay attention as you go through daily tasks will assist and facilitate memory recall later on.  -Allow for time. Take the time needed to learn and recall information. Some people tend to worry if they can't immediately recall something. Instead, you should take time to express a thought or complete a task rather than be critical or harsh on yourself.  -Use external aids to increase structure in daily life. Ongoing use of compensatory strategies such as lists, notes, and a centralized calendar are supported. Tools such as smart phones with alarms and reminders are helpful in bringing structure to daily activities.  -Practice good sleep hygiene. Poor sleep can exacerbate cognitive difficulties and interfere with attention and memory. The sleep foundation has several recommendations for improving sleep quality including:  -Set a sleep schedule with a nightly routine and fixed wakeup time. Soft music, light stretching, reading and/or relaxation exercises (e.g., meditation, deep breathing) can be helpful to wind down before bed. Avoid bright  lights and electronics at least 30 minutes before bed.  -Keep naps short and limited to the early afternoon.  -Avoid caffeine in the afternoon or evening. Avoid eating dinner late.  -Get daylight exposure (sunlight) during the day to encourage quality sleep at night  -Optimize your bedroom with a comfortable mattress, pillow, and bedding; use heavy curtains or an eye mask to prevent light from interrupting your sleep. Light smells, such as lavender, can induce a calmer state of mind and cultivate a positive space for sleep.    -For more information, a simple set of guidelines can be found here: https://www.sleepfoundation.org/sleep-topics/sleep-hygiene  4. The current evaluation will serve as an objective cognitive baseline against which results of future evaluations may be compared.  No follow-up is currently indicated; however, Ms. Tolentino may be referred for a repeat neuropsychological evaluation if there is significant change in cognitive status in the future.     Thank you for the opportunity to participate in Ms. Tolentino s care.  These findings and recommendations were reviewed with the patient in a separate feedback session on 11/11/2022 and all her questions were answered. If you have any questions regarding this evaluation, please do not hesitate to contact me.       Alix White, Ph.D.,   Clinical Neuropsychologist    RELEVANT BACKGROUND INFORMATION AND SUPPORTING DOCUMENTATION  Gathered from a clinical interview with the patient and reviews of electronic medical record.    History of Presenting Problem(s)  Ms. Tolentino presented with a history of anxiety and Benitez-Barr viral infection (diagnosed 3/2022). She reported cognitive complaints for the past 2-3 years in the context of significant stress. She is the mother of 2 adopted teenagers with fetal alcohol spectrum disorder. She stated her son has a longstanding history of challenging behavior, but this became increasingly difficult in 2019 with  several stressors occurring since then. Cognitively, she described difficulty recalling details of conversations and/or if conversations even happened. She reported trouble with focus/concentration, word finding difficulties, and trouble with executive functions such as organizing, planning, and multitasking. She reported difficulty filtering information and described an instance recently of trouble preparing a meal at a party with a lot of people talking around her. She also reported other errors occasionally such mixing up tablespoon and teaspoon when preparing food. She described difficulty with focus when driving and stated it is now more mentally taxing. She denied difficulties managing finances on autopay, medications, simple meal preparations, and basic self-care. She reported her mother has been diagnosed with early onset dementia and she feels as though she has a lot of the same symptoms such as exhaustion after cognitively stimulating activity. She described brain fog, body aches, and exhaustion with onset around December 2021 and was later diagnosed with Benitez-Barr viral infection in March 2022. She stated her energy improved significantly around September; however, 2 weeks before this evaluation she began experiencing exhaustion and reduced appetite and she believes the viral infection has been reactivated in the context of stress. She started taking supplements again, which were helpful previously, and she has reportedly been improving recently; however, she stated she feels worse now than in September. She reported physical changes characterized by being clumsier, dropping things, needing to concentrate more when holding things, missing steps on the stairs, occasional falls, and difficulty with proprioception. Emotionally, she stated feeling  okay . She reported a longstanding history of anxiety but reported she completed [facility] therapy in 2020 and her  anxiety is gone.  She described feeling  irritable at times in the context of significant psychosocial stress, but she stated she's not depressed, and she denied other symptoms of psychological distress.     Neurodiagnostic Findings   ? Brain MRI w/wo contrast (6/14/2022) IMPRESSION: 1.  No acute infarct, acute intracranial hemorrhage, or intracranial mass.    Medical History  ? Benitez-Barr viral infection (3/2022; reported possible reactivation 2 weeks before this evaluation)     Health Behaviors   ? Sleep: ~7 hours of cumulative sleep nightly; occasional delay in onset since EBV infection; ~1 night per week of waking with difficulty returning to sleep; infrequent snoring but denied gasping arousals; longstanding history of RLS treated with nightly naproxen; denied parasomnic behaviors; energy is low recently requiring daytime naps  ? Exercise: Walking dog occasionally, working in the yard, daily stretching  ? Pain: Back and shoulder pain rated 1 or 2/10.     Psychiatric History  ? Ms. Tolentino reported current mood is  okay   ? She reported a history of anxiety and described panic attacks in high school and college; she reported her anxiety is gone following [facility] therapy.   ? She described a lot of psychosocial stress related to her son with FASD and other stressors, particularly in the past 3 years.  ? She otherwise denied history of significant depressive, hypomanic or manic mood symptoms, alteration of sensory perceptual processes or disordered thought.  ? Suicidality/ Self-harm: She reported a remote history of passive suicidal thoughts in college but adamantly denied any recent thoughts of suicide or history of suicidal ideation, plan, or intent.   ? Psychiatric treatment: Participated in [facility] in 2020     Substance Use History  ? Alcohol: None  ? Nicotine: None  ? Cannabis: None  ? Problematic Substance Use: Denied    Current Medications (per EMR)    acetaminophen (TYLENOL) 500 MG tablet (PRN)     azelastine (ASTELIN) 0.1 %  nasal spray     cholecalciferol (VITAMIN D3) 25 mcg (1000 units) capsule     fish oil-omega-3 fatty acids 1000 MG capsule     magnesium oxide 200 MG TABS     mometasone (NASONEX) 50 MCG/ACT nasal spray     naproxen (NAPROSYN) 250 MG tablet     norethindrone-ethinyl estradiol (AUROVELA FE ) 1-20 MG-MCG tablet     selenium 50 MCG TABS tablet     Developmental, Educational, & Occupational History  ? Gestational: Full-term  ?  complications:  Something was wrong  but she was unsure what the specific  complication was; no post-birth complications or treatment required  ? Developmental milestones: Met at expected ages  ? Childhood behavioral / emotional / academic problems: Difficulties with attention; however, she reported being smart enough to cope with these difficulties and did well in school  ? Native Language: English  ? Education: Graduated high school on time with mostly As; obtained bachelor s degree in psychology, master s degree in counseling psychology, and a post-graduate certificate.  ? Occupation: Therapist    Psychosocial History  ? Born in [place], [place] and raised in [place], [place]  ? Marital:  to spouse of 19 years  ? Children: 2 adopted children with FASD (ages 17 and 19)  ? Housing: Lives with spouse and 17-year-old son  ? Psychosocial support: Strong social support network of spouse and a couple of close friends    Family History  ?  Mother (early onset dementia, diagnosed in her 60s)    RESULTS  See separate abstract for list of neuropsychological measures and test scores. Descriptive ranges are based on American Academy of Clinical Neuropsychology (2020) consensus guidelines, or test manuals where appropriate. All standardized scores are adjusted for age and additional adjustments for demographic factors such as education were performed where applicable.    PRE-MORBID ABILITY: Premorbid abilities were estimated within the high average range based on single word reading ability  and demographic factors including sex, ethnicity, education, occupation, and geographic region.  GENERAL COGNITIVE STATUS: Orientation was within expectation for general personal information, time, place, and cultural information.   ATTENTION/EXECUTIVE FUNCTIONS: Immediate auditory attention and working memory performances were average. Verbal abstract reasoning performance was exceptionally high. Visual reasoning through pattern identification was average. Performance on a test of set-shifting/cognitive flexibility was average. Psychomotor processing speed performances were average.  LANGUAGE: Confrontation naming performance was average. Letter-cue verbal fluency performance was average. Semantic verbal fluency performance was average.   VISUOSPATIAL PROCESSING: Performance on a spatial perception task requiring judgement of angled lines was high average. Copy of increasingly complex figures was average.   LEARNING AND MEMORY: Initial encoding of a word list over multiple learning trials was average. Delayed recall of the list was average. Recognition of the word list was average. Initial encoding of contextualized verbal information in the form of short stories was average and delayed retrieval was average. Recognition of story details was high average. Encoding of visual information was high average and delayed recall was average. Recognition among distractors was high average and without error.    MOTOR: Fine-motor dexterity was low average with the dominant (right) hand, and average with the non-dominant (left) hand. Motor findings were not clearly lateralized.   PSYCHOLOGICAL AND BEHAVIORAL: She endorsed moderate symptoms of anxiety and moderate symptoms of depression on self-report questionnaires. On a self-report measure of personality and psychopathology, responses were suggestive of possible overreporting of symptoms as indicated by an unusual number of somatic and/or cognitive symptoms and noncredible  memory complaints. Thus, her notable elevations suggesting multiple somatic complaints should be interpreted cautiously. Specifically, she endorsed a general sense of malaise manifested in poor health and feeling tired, weak, and incapacitated, in addition to a diffuse pattern of cognitive difficulties including memory problems, difficulties with attention and concentration, and possible confusion. Her responses also indicate significant emotional distress. She endorsed a lack of positive emotional experiences, significant anhedonia, and lack of interest. She reported an above average level of stress and excessive worry including worries about misfortune, finances, as well as preoccupation with disappointments. She endorsed feeling helpless and/or hopeless and pessimistic. She also reported not enjoying social events and avoiding social situations, including parties and other events where crowds are likely to gather.    PERFORMANCE VALIDITY: Performance on neuropsychological tests is dependent upon a number of factors, including sufficient engagement and motivation, to reliably establish an examinee s level of cognitive functioning.  Based upon observations made throughout the evaluation, the patient did not appear to deliberately perform in a suboptimal manner and demonstrated good frustration tolerance on cognitively challenging tasks. Scores on dedicated and imbedded indices of performance validity were in the valid range. Overall, test results are believed to accurately represent the patient s current neurocognitive status.    BEHAVIORAL OBSERVATIONS  ? Presented on time and was unaccompanied  ? Alert, oriented to self, time, place, and circumstance; attentive and focused while undergoing testing  ? Appearance: Well-groomed, casually dressed  ? Gait/Posture: No abnormalities noted  ? Sensorimotor: No abnormalities noted  ? Behavior: Cooperative, pleasant, no behavioral abnormalities noted  ? Speech: Fluent,  articulate, normal rate, prosody, and volume; no conversational word finding difficulty  ? Thought process: Logical, linear, and goal-directed   ? Thought content: Logical, appropriate   ? Affect: Broad, responsive, consistent with reported mood; good eye contact  ? Mood: Anxious, depressed  ? Insight and Judgment: Intact  ? Approach to testing: Efficient, deliberate; good frustration on cognitively demanding tasks  ? Rapport: Easily established and maintained      Activities included in this evaluation: CPT Code #Units   Psychiatric diagnostic interview [id number]   Test evaluation services by professional; first hour. [id number]   Test evaluation services by professional, additional hour (+) [id number] 1   Test administration and scoring by technician, first 30 mins [id number] 1   Test administration and scoring by technician, additional 30 mins (+) [id number]   Dx: R41.3

## 2023-02-03 ENCOUNTER — VIRTUAL VISIT (OUTPATIENT)
Dept: NEUROLOGY | Facility: CLINIC | Age: 48
End: 2023-02-03
Payer: COMMERCIAL

## 2023-02-03 DIAGNOSIS — R41.840 CONCENTRATION DEFICIT: Primary | ICD-10-CM

## 2023-02-03 DIAGNOSIS — R41.3 MEMORY DEFICIT: ICD-10-CM

## 2023-02-03 DIAGNOSIS — Z81.8 FAMILY HISTORY OF DEMENTIA: ICD-10-CM

## 2023-02-03 PROCEDURE — 99214 OFFICE O/P EST MOD 30 MIN: CPT | Mod: 95 | Performed by: STUDENT IN AN ORGANIZED HEALTH CARE EDUCATION/TRAINING PROGRAM

## 2023-02-03 NOTE — PATIENT INSTRUCTIONS
IDENTIFYING INFORMATION AND REASON FOR REFERRAL   Ms. Tolentino is a 46-year-old, right-handed, White female with a history of anxiety and Benitez-Barr viral infection (diagnosed 3/2022). This evaluation was requested to provide a comprehensive assessment of her current neuropsychological status to inform treatment planning.      IMPRESSION  See below for relevant background information and detailed test results. See separate abstract for supporting documentation including a list of neuropsychological measures and test scores.     Ms. Tolentino s neuropsychological profile revealed performance within expectation across all cognitive tests administered including immediate auditory attention and working memory, speeded processing, language, visuospatial processing, learning and memory, abstract reasoning, mental flexibility, and bilateral fine-motor dexterity. A strength was noted on a test of verbal abstract reasoning with performance in the exceptionally high range (>97th %ile).      Ms. Tolentino stated her mood is  okay , her  anxiety is gone , and she denied significant psychological stress on clinical interview. In contrast, assessment of current psychological and emotional status through self-report measures revealed moderate symptoms of depression and anxiety. On a self-report measure of personality and psychopathology, responses were suggestive of possible overreporting of somatic and/or cognitive/memory complaints. She endorsed a general sense of malaise and a diffuse pattern of cognitive difficulties. Her responses also indicated significant emotional distress including a lack of positive emotional experiences, significant anhedonia, lack of interest, feeling helpless and/or hopeless, an above average level of stress, excessive worry, and social avoidance.      Overall, Ms. Tolentino is functioning quite well from a cognitive standpoint and does not evidence any objective cognitive deficits. Her cognitive profile  is not suggestive of focal or lateralized cerebral dysfunction, and it does not raise concerns about acquired neurologic disease. She presented with a degree of cognitive concerns that are not evident on formal neuropsychological testing. It seems that she has moderate symptoms of anxiety and depression, which may represent symptoms that are quite understandably a result of significant psychosocial stress. Her subjective cognitive complaints are likely attributable to cognitive lapses due to stress, anxiety, and depression, which intermittently interfere with cognitive effectiveness, typically through interfering with normal attention and efficiency of information processing. These difficulties can compromise other aspects of cognition, such as remembering conversations, attending to multiple tasks at once, and organizing thoughts and speech. Sleep disturbance, fatigue, and malaise, possibly related to ongoing Benitez-Barr viral infection, are also likely contributory to her subjective cognitive complaints.      RECOMMENDATIONS  Ms. Tolentino reported moderate symptoms of anxiety and depression as well as significant stress. She is encouraged to continue regular engagement in individual psychotherapy to address these mental health symptoms.  Ms. Tolentino is encouraged to live a healthy lifestyle that promotes brain health including getting appropriate exercise and eating healthy.    Ms. Tolentino is encouraged to utilize the following behavioral strategies to maximize cognitive functioning in her daily life:   -Eliminate distraction. Eliminating environmental distracters can facilitate attention and memory performance. For example, turning off music or the television while having a conversation, so that all of your attention is focused on the task at hand.  -Work on decreasing interference from intrusive thoughts. When intrusive thoughts begin to interfere with your thinking, do not concentrate on them. Instead, observe  them passively and calmly redirect your attention back to task.   -Engage in calming activities that increase focus on the present. Incorporating mindfulness techniques such as meditation, relaxation, yoga, and moderate cardiovascular exercise, into your daily routine will help keep you present-oriented and consequently improve attention and memory.  -Pay mindful attention. You may find that you need to make a conscious effort to pay attention to conversations or when learning new information. When attention is not focused, it is difficult for the brain to learn new information. Thus, making a mindful effort to pay attention as you go through daily tasks will assist and facilitate memory recall later on.  -Allow for time. Take the time needed to learn and recall information. Some people tend to worry if they can't immediately recall something. Instead, you should take time to express a thought or complete a task rather than be critical or harsh on yourself.  -Use external aids to increase structure in daily life. Ongoing use of compensatory strategies such as lists, notes, and a centralized calendar are supported. Tools such as smart phones with alarms and reminders are helpful in bringing structure to daily activities.  -Practice good sleep hygiene. Poor sleep can exacerbate cognitive difficulties and interfere with attention and memory. The sleep foundation has several recommendations for improving sleep quality including:  -Set a sleep schedule with a nightly routine and fixed wakeup time. Soft music, light stretching, reading and/or relaxation exercises (e.g., meditation, deep breathing) can be helpful to wind down before bed. Avoid bright lights and electronics at least 30 minutes before bed.  -Keep naps short and limited to the early afternoon.  -Avoid caffeine in the afternoon or evening. Avoid eating dinner late.  -Get daylight exposure (sunlight) during the day to encourage quality sleep at night  -Optimize  your bedroom with a comfortable mattress, pillow, and bedding; use heavy curtains or an eye mask to prevent light from interrupting your sleep. Light smells, such as lavender, can induce a calmer state of mind and cultivate a positive space for sleep.    -For more information, a simple set of guidelines can be found here: https://www.sleepfoundation.org/sleep-topics/sleep-hygiene  The current evaluation will serve as an objective cognitive baseline against which results of future evaluations may be compared.  No follow-up is currently indicated; however, Ms. Tolentino may be referred for a repeat neuropsychological evaluation if there is significant change in cognitive status in the future.      Thank you for the opportunity to participate in Ms. Tolentino s care.  These findings and recommendations were reviewed with the patient in a separate feedback session on 11/11/2022 and all her questions were answered. If you have any questions regarding this evaluation, please do not hesitate to contact me.      Alix White, Ph.D.,   Clinical Neuropsychologist     RELEVANT BACKGROUND INFORMATION AND SUPPORTING DOCUMENTATION  Gathered from a clinical interview with the patient and reviews of electronic medical record.     History of Presenting Problem(s)  Ms. Tolentino presented with a history of anxiety and Benitez-Barr viral infection (diagnosed 3/2022). She reported cognitive complaints for the past 2-3 years in the context of significant stress. She is the mother of 2 adopted teenagers with fetal alcohol spectrum disorder. She stated her son has a longstanding history of challenging behavior, but this became increasingly difficult in 2019 with several stressors occurring since then. Cognitively, she described difficulty recalling details of conversations and/or if conversations even happened. She reported trouble with focus/concentration, word finding difficulties, and trouble with executive functions such as  organizing, planning, and multitasking. She reported difficulty filtering information and described an instance recently of trouble preparing a meal at a party with a lot of people talking around her. She also reported other errors occasionally such mixing up tablespoon and teaspoon when preparing food. She described difficulty with focus when driving and stated it is now more mentally taxing. She denied difficulties managing finances on autopay, medications, simple meal preparations, and basic self-care. She reported her mother has been diagnosed with early onset dementia and she feels as though she has a lot of the same symptoms such as exhaustion after cognitively stimulating activity. She described brain fog, body aches, and exhaustion with onset around December 2021 and was later diagnosed with Benitez-Barr viral infection in March 2022. She stated her energy improved significantly around September; however, 2 weeks before this evaluation she began experiencing exhaustion and reduced appetite and she believes the viral infection has been reactivated in the context of stress. She started taking supplements again, which were helpful previously, and she has reportedly been improving recently; however, she stated she feels worse now than in September. She reported physical changes characterized by being clumsier, dropping things, needing to concentrate more when holding things, missing steps on the stairs, occasional falls, and difficulty with proprioception. Emotionally, she stated feeling  okay . She reported a longstanding history of anxiety but reported she completed [facility] therapy in 2020 and her  anxiety is gone.  She described feeling irritable at times in the context of significant psychosocial stress, but she stated she's not depressed, and she denied other symptoms of psychological distress.      Neurodiagnostic Findings   Brain MRI w/wo contrast (6/14/2022) IMPRESSION: 1.  No acute infarct, acute  intracranial hemorrhage, or intracranial mass.     Medical History  Benitez-Barr viral infection (3/2022; reported possible reactivation 2 weeks before this evaluation)      Health Behaviors   Sleep: ~7 hours of cumulative sleep nightly; occasional delay in onset since EBV infection; ~1 night per week of waking with difficulty returning to sleep; infrequent snoring but denied gasping arousals; longstanding history of RLS treated with nightly naproxen; denied parasomnic behaviors; energy is low recently requiring daytime naps  Exercise: Walking dog occasionally, working in the yard, daily stretching  Pain: Back and shoulder pain rated 1 or 2/10.      Psychiatric History  Ms. Tolentino reported current mood is  okay   She reported a history of anxiety and described panic attacks in high school and college; she reported her anxiety is gone following [facility] therapy.   She described a lot of psychosocial stress related to her son with FASD and other stressors, particularly in the past 3 years.  She otherwise denied history of significant depressive, hypomanic or manic mood symptoms, alteration of sensory perceptual processes or disordered thought.  Suicidality/ Self-harm: She reported a remote history of passive suicidal thoughts in college but adamantly denied any recent thoughts of suicide or history of suicidal ideation, plan, or intent.   Psychiatric treatment: Participated in [facility] in 2020      Substance Use History  Alcohol: None  Nicotine: None  Cannabis: None  Problematic Substance Use: Denied     Current Medications (per EMR)   acetaminophen (TYLENOL) 500 MG tablet (PRN)    azelastine (ASTELIN) 0.1 % nasal spray    cholecalciferol (VITAMIN D3) 25 mcg (1000 units) capsule    fish oil-omega-3 fatty acids 1000 MG capsule    magnesium oxide 200 MG TABS    mometasone (NASONEX) 50 MCG/ACT nasal spray    naproxen (NAPROSYN) 250 MG tablet    norethindrone-ethinyl estradiol (AUROVELA FE 1/20) 1-20  MG-MCG tablet    selenium 50 MCG TABS tablet      Developmental, Educational, & Occupational History  Gestational: Full-term   complications:  Something was wrong  but she was unsure what the specific  complication was; no post-birth complications or treatment required  Developmental milestones: Met at expected ages  Childhood behavioral / emotional / academic problems: Difficulties with attention; however, she reported being smart enough to cope with these difficulties and did well in school  Native Language: English  Education: Graduated high school on time with mostly As; obtained bachelor s degree in psychology, master s degree in counseling psychology, and a post-graduate certificate.  Occupation: Therapist     Psychosocial History  Born in [place], [place] and raised in [place], [place]  Marital:  to spouse of 19 years  Children: 2 adopted children with FASD (ages 17 and 19)  Housing: Lives with spouse and 17-year-old son  Psychosocial support: Strong social support network of spouse and a couple of close friends     Family History   Mother (early onset dementia, diagnosed in her 60s)     RESULTS  See separate abstract for list of neuropsychological measures and test scores. Descriptive ranges are based on American Academy of Clinical Neuropsychology (2020) consensus guidelines, or test manuals where appropriate. All standardized scores are adjusted for age and additional adjustments for demographic factors such as education were performed where applicable.     PRE-MORBID ABILITY: Premorbid abilities were estimated within the high average range based on single word reading ability and demographic factors including sex, ethnicity, education, occupation, and geographic region.  GENERAL COGNITIVE STATUS: Orientation was within expectation for general personal information, time, place, and cultural information.   ATTENTION/EXECUTIVE FUNCTIONS: Immediate auditory attention and working memory  performances were average. Verbal abstract reasoning performance was exceptionally high. Visual reasoning through pattern identification was average. Performance on a test of set-shifting/cognitive flexibility was average. Psychomotor processing speed performances were average.  LANGUAGE: Confrontation naming performance was average. Letter-cue verbal fluency performance was average. Semantic verbal fluency performance was average.   VISUOSPATIAL PROCESSING: Performance on a spatial perception task requiring judgement of angled lines was high average. Copy of increasingly complex figures was average.   LEARNING AND MEMORY: Initial encoding of a word list over multiple learning trials was average. Delayed recall of the list was average. Recognition of the word list was average. Initial encoding of contextualized verbal information in the form of short stories was average and delayed retrieval was average. Recognition of story details was high average. Encoding of visual information was high average and delayed recall was average. Recognition among distractors was high average and without error.    MOTOR: Fine-motor dexterity was low average with the dominant (right) hand, and average with the non-dominant (left) hand. Motor findings were not clearly lateralized.   PSYCHOLOGICAL AND BEHAVIORAL: She endorsed moderate symptoms of anxiety and moderate symptoms of depression on self-report questionnaires. On a self-report measure of personality and psychopathology, responses were suggestive of possible overreporting of symptoms as indicated by an unusual number of somatic and/or cognitive symptoms and noncredible memory complaints. Thus, her notable elevations suggesting multiple somatic complaints should be interpreted cautiously. Specifically, she endorsed a general sense of malaise manifested in poor health and feeling tired, weak, and incapacitated, in addition to a diffuse pattern of cognitive difficulties including  memory problems, difficulties with attention and concentration, and possible confusion. Her responses also indicate significant emotional distress. She endorsed a lack of positive emotional experiences, significant anhedonia, and lack of interest. She reported an above average level of stress and excessive worry including worries about misfortune, finances, as well as preoccupation with disappointments. She endorsed feeling helpless and/or hopeless and pessimistic. She also reported not enjoying social events and avoiding social situations, including parties and other events where crowds are likely to gather.    PERFORMANCE VALIDITY: Performance on neuropsychological tests is dependent upon a number of factors, including sufficient engagement and motivation, to reliably establish an examinee s level of cognitive functioning.  Based upon observations made throughout the evaluation, the patient did not appear to deliberately perform in a suboptimal manner and demonstrated good frustration tolerance on cognitively challenging tasks. Scores on dedicated and imbedded indices of performance validity were in the valid range. Overall, test results are believed to accurately represent the patient s current neurocognitive status.     BEHAVIORAL OBSERVATIONS  Presented on time and was unaccompanied  Alert, oriented to self, time, place, and circumstance; attentive and focused while undergoing testing  Appearance: Well-groomed, casually dressed  Gait/Posture: No abnormalities noted  Sensorimotor: No abnormalities noted  Behavior: Cooperative, pleasant, no behavioral abnormalities noted  Speech: Fluent, articulate, normal rate, prosody, and volume; no conversational word finding difficulty  Thought process: Logical, linear, and goal-directed   Thought content: Logical, appropriate   Affect: Broad, responsive, consistent with reported mood; good eye contact  Mood: Anxious, depressed  Insight and Judgment: Intact  Approach to  testing: Efficient, deliberate; good frustration on cognitively demanding tasks  Rapport: Easily established and maintained

## 2023-02-03 NOTE — LETTER
2/3/2023         RE: Nithya Tolentino  [address] [facility] [address]        Dear Colleague,    Thank you for referring your patient, Nithya Tolentino, to the [facility] NEUROLOGY CLINICS [facility]. Please see a copy of my visit note below.    [facility]/[place]  Section of General Neurology  Return Patient  Virtual Visit    Nithya Tolentino MRN# [id number]   Age: 47 year old YOB: 1975              Assessment and Plan:   Assessment:  Divya Tolentino is a pleasant 47 year old female seen in follow up for memory loss, fatigue/malaise which were temporally related to a rather profound bout of EBV infection.  We reviewed her course to date and MRI data/ neuropsychological testing.  I see no indication of dementia or increased risk of dementia as currently, good news.  Overall I think she has a good understanding of how to minimize stress and work through her symptoms.   All questions answered, will follow up on an as needed basis for now.           Moses Manzanares MD   of Neurology   [facility]      Interval history:   She feels like her energy is back.    EBV may still be resurfacing at times.  She gets a burning smell in her nose if so.    She is taking supplements in this regard.  Olive leaf, Romanian black raddish, drenamine, cataplex G via a naturopath/applied kinesiologist,   Vitamin B complex, C, magnesium each day also.  Selenium at times.    Son recently had court e.g. which was stressful which led to exhaustion.  She has good insight into this.  Discussed Vitamin D and MS risk.        A/P at last visit  Divya Tolentino is a pleasant 46 year old female who presents to discuss memory changes, fatigue/malaise and muscle aches.  Her worsening was most abrupt after what appears be an EBV infection which led to transaminitis/other manifestations of liver dysfunction in fact including itching/hyperbilirubinemia.  This has set her back  a fair amount in my estimation.  I think this does correlate with what she is experiencing.  She does seem to have recovered to a degree, albeit slowly.   Discussed that optimizing sleep and mood can be important for recovery in this regard.  Gradual return to previous normalcy including exercise is recommended as well but this can be quite tiring initially.   Discussed that I think she will continue to slowly improve but will see what else we can optimize as below     --MRI brain w/w/o  --CK level, other lab work reviewed as above, non contributory  --Neuropsychological testing regarding cognitive complaints to further parse out her current cognitive strengths/weaknessess.  She notes a family history of dementia.  I do not suspect this testing will reveal any concerns with regards to dementia.    --Follow up in 4-5 months to review testing and see how she is doing.      Past Medical History:     Patient Active Problem List   Diagnosis     Seasonal allergic rhinitis     Dysmenorrhea     CARDIOVASCULAR SCREENING; LDL GOAL LESS THAN 160     Restless legs syndrome (RLS)     Family history of supraventricular tachycardia     Other chronic sinusitis     Rhinosinusitis     Premenopausal menorrhagia     Acquired hallux limitus of left foot     Benitez-Barr virus infection     Ulnar neuropathy of both upper extremities     Past Medical History:   Diagnosis Date     Dysmenorrhea      Environmental allergies         Past Surgical History:     Past Surgical History:   Procedure Laterality Date     NO HISTORY OF SURGERY          Social History:     Social History     Tobacco Use     Smoking status: Never     Smokeless tobacco: Never   Vaping Use     Vaping Use: Never used   Substance Use Topics     Alcohol use: Yes     Comment: very rarely 1-2 times per year     Drug use: No        Family History:     Family History   Problem Relation Age of Onset     Thyroid Disease Mother      Dementia Mother      Hypertension Mother       Anesthesia Reaction Father         difficulty coming out     Arthritis Maternal Grandmother         osteoarthritis      Cancer Maternal Grandfather         bone     Cerebrovascular Disease Maternal Grandfather         Passed in 1997     Other Cancer Maternal Grandfather         bone cancer     Neurologic Disorder Paternal Grandmother         brain tumor     Hypertension Paternal Grandmother         Passed in 2007 after brain tumor surgery     Gastrointestinal Disease Other         cousin on dads side has celiac     Thyroid Disease Maternal Aunt      Other Cancer Cousin         Melanoma     Other Cancer Other         Amyloid Cancer        Medications:     Current Outpatient Medications   Medication Sig     acetaminophen (TYLENOL) 500 MG tablet Take 1-2 tablets by mouth every 6 hours as needed 2 tablets before bedtime     azelastine (ASTELIN) 0.1 % nasal spray Spray 2 sprays into both nostrils 2 times daily     cholecalciferol (VITAMIN D3) 25 mcg (1000 units) capsule Take 2 capsules by mouth daily     fish oil-omega-3 fatty acids 1000 MG capsule Take 2 g by mouth daily      magnesium oxide 200 MG TABS      mometasone (NASONEX) 50 MCG/ACT nasal spray Spray 2 sprays into both nostrils daily     naproxen (NAPROSYN) 250 MG tablet Take 250 mg by mouth 2 times daily (with meals)     norethindrone-ethinyl estradiol (AUROVELA FE 1/20) 1-20 MG-MCG tablet Take 1 tablet by mouth daily     selenium 50 MCG TABS tablet Take 200 mcg by mouth daily (Patient not taking: Reported on 10/11/2022)     No current facility-administered medications for this visit.        Allergies:     Allergies   Allergen Reactions     Augmentin Nausea and Vomiting        Review of Systems:   As noted above     Physical Exam:   General: Seated comfortably in no acute distress.  Neurologic:     Mental Status: Fully alert, attentive and oriented. Speech clear and fluent, no paraphasic errors.     Cranial Nerves:Facial movements symmetric. Hearing not formally  tested but intact to conversation.  No dysarthria.     Motor: No tremors or other abnormal movements observed.      Sensory:Not able to be tested virtually           Data: Pertinent prior to visit   Imaging:  MRI brain 6/2022  IMPRESSION:  1.  No acute infarct, acute intracranial hemorrhage, or intracranial mass.    I personally reviewed the above imaging and agree with the findings in the report.        Procedures:  Neuropsychological testing reviewed  Overall, Ms. Tolentino is functioning quite well from a cognitive standpoint and does not evidence any objective cognitive deficits. Her cognitive profile is not suggestive of focal or lateralized cerebral dysfunction, and it does not raise concerns about acquired neurologic disease. She presented with a degree of cognitive concerns that are not evident on formal neuropsychological testing. It seems that she has moderate symptoms of anxiety and depression, which may represent symptoms that are quite understandably a result of significant psychosocial stress. Her subjective cognitive complaints are likely attributable to cognitive lapses due to stress, anxiety, and depression, which intermittently interfere with cognitive effectiveness, typically through interfering with normal attention and efficiency of information processing. These difficulties can compromise other aspects of cognition, such as remembering conversations, attending to multiple tasks at once, and organizing thoughts and speech. Sleep disturbance, fatigue, and malaise, possibly related to ongoing Benitez-Barr viral infection, are also likely contributory to her subjective cognitive complaints.       Video data:  Time: 1:02-1:19 (17 minutes)  Source: Marielle  Provider location: on site  Pt location: home                 The total time of this encounter today amounted to 17 minutes of time on video visit and 30 minutes in total. This time included time spent with the patient, prep work, ordering tests, and  performing post visit documentation.        Again, thank you for allowing me to participate in the care of your patient.        Sincerely,        Osmel Manzanares MD

## 2023-02-03 NOTE — NURSING NOTE
"Nithya Tolentino is a 47 year old female who presents for:  Chief Complaint   Patient presents with     Follow Up     Neuropsychology results        Initial Vitals:  There were no vitals taken for this visit. Estimated body mass index is 28.27 kg/m  as calculated from the following:    Height as of 10/3/22: 1.702 m (5' 7\").    Weight as of 10/3/22: 81.9 kg (180 lb 8 oz).. There is no height or weight on file to calculate BSA. BP completed using cuff size: NA (Not Taken)    Nursing Comments:     Ramakrishna Gonzalez    "

## 2023-02-03 NOTE — PROGRESS NOTES
[facility]/[place]  Section of General Neurology  Return Patient  Virtual Visit    Nithya Tolentino MRN# [id number]   Age: 47 year old YOB: 1975              Assessment and Plan:   Assessment:  Divya Tolentino is a pleasant 47 year old female seen in follow up for memory loss, fatigue/malaise which were temporally related to a rather profound bout of EBV infection.  We reviewed her course to date and MRI data/ neuropsychological testing.  I see no indication of dementia or increased risk of dementia as currently, good news.  Overall I think she has a good understanding of how to minimize stress and work through her symptoms.   All questions answered, will follow up on an as needed basis for now.           Moses Manzanares MD   of Neurology   [facility]      Interval history:   She feels like her energy is back.    EBV may still be resurfacing at times.  She gets a burning smell in her nose if so.    She is taking supplements in this regard.  Olive leaf, Turkish black raddish, drenamine, cataplex G via a naturopath/applied kinesiologist,   Vitamin B complex, C, magnesium each day also.  Selenium at times.    Son recently had court e.g. which was stressful which led to exhaustion.  She has good insight into this.  Discussed Vitamin D and MS risk.        A/P at last visit  Divya Tolentino is a pleasant 46 year old female who presents to discuss memory changes, fatigue/malaise and muscle aches.  Her worsening was most abrupt after what appears be an EBV infection which led to transaminitis/other manifestations of liver dysfunction in fact including itching/hyperbilirubinemia.  This has set her back a fair amount in my estimation.  I think this does correlate with what she is experiencing.  She does seem to have recovered to a degree, albeit slowly.   Discussed that optimizing sleep and mood can be important for recovery in this regard.  Gradual return  to previous normalcy including exercise is recommended as well but this can be quite tiring initially.   Discussed that I think she will continue to slowly improve but will see what else we can optimize as below     --MRI brain w/w/o  --CK level, other lab work reviewed as above, non contributory  --Neuropsychological testing regarding cognitive complaints to further parse out her current cognitive strengths/weaknessess.  She notes a family history of dementia.  I do not suspect this testing will reveal any concerns with regards to dementia.    --Follow up in 4-5 months to review testing and see how she is doing.      Past Medical History:     Patient Active Problem List   Diagnosis     Seasonal allergic rhinitis     Dysmenorrhea     CARDIOVASCULAR SCREENING; LDL GOAL LESS THAN 160     Restless legs syndrome (RLS)     Family history of supraventricular tachycardia     Other chronic sinusitis     Rhinosinusitis     Premenopausal menorrhagia     Acquired hallux limitus of left foot     Benitez-Barr virus infection     Ulnar neuropathy of both upper extremities     Past Medical History:   Diagnosis Date     Dysmenorrhea      Environmental allergies         Past Surgical History:     Past Surgical History:   Procedure Laterality Date     NO HISTORY OF SURGERY          Social History:     Social History     Tobacco Use     Smoking status: Never     Smokeless tobacco: Never   Vaping Use     Vaping Use: Never used   Substance Use Topics     Alcohol use: Yes     Comment: very rarely 1-2 times per year     Drug use: No        Family History:     Family History   Problem Relation Age of Onset     Thyroid Disease Mother      Dementia Mother      Hypertension Mother      Anesthesia Reaction Father         difficulty coming out     Arthritis Maternal Grandmother         osteoarthritis      Cancer Maternal Grandfather         bone     Cerebrovascular Disease Maternal Grandfather         Passed in 1997     Other Cancer Maternal  Grandfather         bone cancer     Neurologic Disorder Paternal Grandmother         brain tumor     Hypertension Paternal Grandmother         Passed in 2007 after brain tumor surgery     Gastrointestinal Disease Other         cousin on dads side has celiac     Thyroid Disease Maternal Aunt      Other Cancer Cousin         Melanoma     Other Cancer Other         Amyloid Cancer        Medications:     Current Outpatient Medications   Medication Sig     acetaminophen (TYLENOL) 500 MG tablet Take 1-2 tablets by mouth every 6 hours as needed 2 tablets before bedtime     azelastine (ASTELIN) 0.1 % nasal spray Spray 2 sprays into both nostrils 2 times daily     cholecalciferol (VITAMIN D3) 25 mcg (1000 units) capsule Take 2 capsules by mouth daily     fish oil-omega-3 fatty acids 1000 MG capsule Take 2 g by mouth daily      magnesium oxide 200 MG TABS      mometasone (NASONEX) 50 MCG/ACT nasal spray Spray 2 sprays into both nostrils daily     naproxen (NAPROSYN) 250 MG tablet Take 250 mg by mouth 2 times daily (with meals)     norethindrone-ethinyl estradiol (AUROVELA FE 1/20) 1-20 MG-MCG tablet Take 1 tablet by mouth daily     selenium 50 MCG TABS tablet Take 200 mcg by mouth daily (Patient not taking: Reported on 10/11/2022)     No current facility-administered medications for this visit.        Allergies:     Allergies   Allergen Reactions     Augmentin Nausea and Vomiting        Review of Systems:   As noted above     Physical Exam:   General: Seated comfortably in no acute distress.  Neurologic:     Mental Status: Fully alert, attentive and oriented. Speech clear and fluent, no paraphasic errors.     Cranial Nerves:Facial movements symmetric. Hearing not formally tested but intact to conversation.  No dysarthria.     Motor: No tremors or other abnormal movements observed.      Sensory:Not able to be tested virtually           Data: Pertinent prior to visit   Imaging:  MRI brain 6/2022  IMPRESSION:  1.  No acute  infarct, acute intracranial hemorrhage, or intracranial mass.    I personally reviewed the above imaging and agree with the findings in the report.        Procedures:  Neuropsychological testing reviewed  Overall, Ms. Tolentino is functioning quite well from a cognitive standpoint and does not evidence any objective cognitive deficits. Her cognitive profile is not suggestive of focal or lateralized cerebral dysfunction, and it does not raise concerns about acquired neurologic disease. She presented with a degree of cognitive concerns that are not evident on formal neuropsychological testing. It seems that she has moderate symptoms of anxiety and depression, which may represent symptoms that are quite understandably a result of significant psychosocial stress. Her subjective cognitive complaints are likely attributable to cognitive lapses due to stress, anxiety, and depression, which intermittently interfere with cognitive effectiveness, typically through interfering with normal attention and efficiency of information processing. These difficulties can compromise other aspects of cognition, such as remembering conversations, attending to multiple tasks at once, and organizing thoughts and speech. Sleep disturbance, fatigue, and malaise, possibly related to ongoing Benitez-Barr viral infection, are also likely contributory to her subjective cognitive complaints.       Video data:  Time: 1:02-1:19 (17 minutes)  Source: Marielle  Provider location: on site  Pt location: home                 The total time of this encounter today amounted to 17 minutes of time on video visit and 30 minutes in total. This time included time spent with the patient, prep work, ordering tests, and performing post visit documentation.

## 2023-03-30 DIAGNOSIS — J30.89 SEASONAL ALLERGIC RHINITIS DUE TO OTHER ALLERGIC TRIGGER: ICD-10-CM

## 2023-03-30 RX ORDER — AZELASTINE 1 MG/ML
2 SPRAY, METERED NASAL 2 TIMES DAILY
Qty: 30 ML | Refills: 1 | Status: SHIPPED | OUTPATIENT
Start: 2023-03-30 | End: 2023-05-23

## 2023-03-30 NOTE — TELEPHONE ENCOUNTER
Prescription approved per [facility] Refill Protocol.  Hilary Morrison RN on 3/30/2023 at 3:23 PM

## 2023-04-17 ENCOUNTER — TELEPHONE (OUTPATIENT)
Dept: ENDOCRINOLOGY | Facility: CLINIC | Age: 48
End: 2023-04-17
Payer: COMMERCIAL

## 2023-04-17 NOTE — TELEPHONE ENCOUNTER
Left Voicemail (2nd Attempt) for the patient to call back and schedule the following:    Appointment type: return  Provider: dr. altman  Return date: 10/11/2023  Specialty phone number: [phone]   Additional appointment(s) needed:  labs prior to f/up apt, thyroid US prior to f/up apt.  Additonal Notes: Return in about 1 year (around 10/11/2023)    [facility] card Procedure   Orthopedics, Podiatry, Sports Medicine, Ent ,Eye , Audiology, Adult Endocrine & Diabetes, Nutrition & Medication Therapy Management Specialties   [facility]

## 2023-04-17 NOTE — TELEPHONE ENCOUNTER
Patient is moving to [facility], in the near future. Once she has established care with a New Endocrinologist, she will request records to be sent to that Clinic.    Jovita NORIEGA/Procedure    [facility]   Neurology, NeuroSurgery, NeuroPsychology and Pain Management Specialties  Medical/Surgical Adult Specialties

## 2023-04-23 ENCOUNTER — HEALTH MAINTENANCE LETTER (OUTPATIENT)
Age: 48
End: 2023-04-23

## 2023-05-22 DIAGNOSIS — J30.89 SEASONAL ALLERGIC RHINITIS DUE TO OTHER ALLERGIC TRIGGER: ICD-10-CM

## 2023-05-23 RX ORDER — AZELASTINE 1 MG/ML
SPRAY, METERED NASAL
Qty: 30 ML | Refills: 1 | Status: SHIPPED | OUTPATIENT
Start: 2023-05-23

## 2023-07-17 NOTE — TELEPHONE ENCOUNTER
"Provider's message: \"RN's please call patient. Recommend  make a visit with his PCP to discuss getting tested.   As for her new symptoms I am not certain if this is related or what specifically is going on. Hard to know so please triage.     Yes I can place a infectious disease referral, they will contact her within 1-2 business days.       Dede Marcelo, APRN CNP  Questions or concerns please feel free to send me a [place] message or call me  Phone : [phone]\"    Left message for pt asking that she call the clinic back and speak to a triage nurse.     Hilary Morrison, KITTYN, RN, PHN  Registered Nurse-Clinic Triage  [facility]/[place]  3/30/2022 at 9:46 AM    " Scc Well Differentiated Histology Text: There were nests of invasive well-differentiated atypical keratinocytes seen in the dermis.

## 2023-10-09 DIAGNOSIS — N94.6 DYSMENORRHEA: ICD-10-CM

## 2023-10-09 RX ORDER — NORETHINDRONE ACETATE AND ETHINYL ESTRADIOL 1MG-20(21)
1 KIT ORAL DAILY
Qty: 84 TABLET | Refills: 2 | OUTPATIENT
Start: 2023-10-09

## 2023-10-09 NOTE — TELEPHONE ENCOUNTER
Patient has established care in [place].       WARNER Pro CNP  Questions or concerns please feel free to send me a [facility] message or call me  Phone : [phone]

## 2023-12-09 ENCOUNTER — HEALTH MAINTENANCE LETTER (OUTPATIENT)
Age: 48
End: 2023-12-09

## 2024-04-27 ENCOUNTER — HEALTH MAINTENANCE LETTER (OUTPATIENT)
Age: 49
End: 2024-04-27

## 2024-11-25 NOTE — MR AVS SNAPSHOT
After Visit Summary   10/12/2017    Nithya Tolentino    MRN: [id number]           Patient Information     Date Of Birth          1975        Visit Information        Provider Department      10/12/2017 8:15 AM Nithya Crooks PA-C [facility]        Today's Diagnoses     Acute non-recurrent maxillary sinusitis          Care Instructions    Plain mucinex will only liquify your secretions, guaifenesin    When you need a decongestant, get pseudoephedrine ( sudafed)  behind the counter at the pharmacy- will need to show your ID to get       Use benadryl at bedtime only if you have excessive runny nose, otherwise just stick to your allegra for daytime use          Follow-ups after your visit        Who to contact     If you have questions or need follow up information about today's clinic visit or your schedule please contact [facility] directly at [phone].  Normal or non-critical lab and imaging results will be communicated to you by MyChart, letter or phone within 4 business days after the clinic has received the results. If you do not hear from us within 7 days, please contact the clinic through [employer] or phone. If you have a critical or abnormal lab result, we will notify you by phone as soon as possible.  Submit refill requests through [employer] or call your pharmacy and they will forward the refill request to us. Please allow 3 business days for your refill to be completed.          Additional Information About Your Visit        MyChart Information     [employer] gives you secure access to your electronic health record. If you see a primary care provider, you can also send messages to your care team and make appointments. If you have questions, please call your primary care clinic.  If you do not have a primary care provider, please call [phone] and they will assist you.        Care EveryWhere ID     This is your Care EveryWhere ID. This could be used by other  organizations to access your [facility] medical records  [id number]        Your Vitals Were     Pulse Respirations Last Period BMI (Body Mass Index)          68 16 09/15/2017 (Exact Date) 27.22 kg/m2         Blood Pressure from Last 3 Encounters:   10/12/17 110/60   10/06/17 113/62   09/28/17 112/68    Weight from Last 3 Encounters:   10/12/17 173 lb 12.8 oz (78.8 kg)   09/28/17 170 lb 8 oz (77.3 kg)   04/17/17 177 lb 9.6 oz (80.6 kg)              Today, you had the following     No orders found for display         Today's Medication Changes          These changes are accurate as of: 10/12/17  8:49 AM.  If you have any questions, ask your nurse or doctor.               Start taking these medicines.        Dose/Directions    azithromycin 250 MG tablet   Commonly known as:  ZITHROMAX   Used for:  Acute non-recurrent maxillary sinusitis   Started by:  Nithya Crooks PA-C        Two tablets first day, then one tablet daily for four days.   Quantity:  6 tablet   Refills:  0            Where to get your medicines      These medications were sent to [facility] [address] [place], [place] - [address]  [address] [address] [facility] [address]     Phone:  [phone]     azithromycin 250 MG tablet                Primary Care Provider    Physician No Ref-Primary       NO REF-PRIMARY PHYSICIAN        Equal Access to Services     HUONG BARTON AH: Hadii stan rey hadasho Soomaali, waaxda luqadaha, qaybta kaalmada adelizbet, curtis royal. So [facility] [phone].    ATENCIÓN: Si habla español, tiene a snow disposición servicios gratuitos de asistencia lingüística. Llame al [phone].    We comply with applicable federal civil rights laws and [place] laws. We do not discriminate on the basis of race, color, national origin, age, disability, sex, sexual orientation, or gender identity.            Thank you!     Thank you for choosing [facility] [place]  for your care. Our goal is always to provide you  with excellent care. Hearing back from our patients is one way we can continue to improve our services. Please take a few minutes to complete the written survey that you may receive in the mail after your visit with us. Thank you!             Your Updated Medication List - Protect others around you: Learn how to safely use, store and throw away your medicines at www.disposemymeds.org.          This list is accurate as of: 10/12/17  8:49 AM.  Always use your most recent med list.                   Brand Name Dispense Instructions for use Diagnosis    MONSERRAT SELTZER PLUS PO           azithromycin 250 MG tablet    ZITHROMAX    6 tablet    Two tablets first day, then one tablet daily for four days.    Acute non-recurrent maxillary sinusitis       calcium-magnesium 500-250 MG Tabs per tablet    CALMAG     Take 1 tablet by mouth daily        cyanocobalamin 1000 MCG tablet    vitamin  B-12     Take 1,000 mcg by mouth daily        doxycycline 100 MG tablet    VIBRA-TABS    14 tablet    Take 1 tablet (100 mg) by mouth 2 times daily for 7 days    Acute sinusitis with coexisting condition, need prophylactic treatment       fexofenadine 180 MG tablet    ALLEGRA    30 tablet    Take 1 tablet (180 mg) by mouth daily    Other chronic sinusitis, Rhinosinusitis       IRON SUPPLEMENT PO      Take 65 mg by mouth daily Reported on 3/6/2017        mometasone 50 MCG/ACT spray    NASONEX    1 Box    Spray 2 sprays into both nostrils daily    Other chronic sinusitis, Rhinosinusitis       naproxen 500 MG tablet    NAPROSYN    60 tablet    Take 1 tablet (500 mg) by mouth 2 times daily as needed for moderate pain    Dysmenorrhea       norethindrone-ethinyl estradiol 1-20 MG-MCG per tablet    JUNEL FE 1/20    84 tablet    Take 1 tablet by mouth daily    Dysmenorrhea       TYLENOL 500 MG tablet   Generic drug:  acetaminophen      Take 1-2 tablets by mouth every 6 hours as needed. 2 tablets before bedtime           Detail Level: Zone Continue Regimen: Fluocinonide cream PRN flare Render In Strict Bullet Format?: No